# Patient Record
Sex: MALE | Race: WHITE | Employment: FULL TIME | URBAN - METROPOLITAN AREA
[De-identification: names, ages, dates, MRNs, and addresses within clinical notes are randomized per-mention and may not be internally consistent; named-entity substitution may affect disease eponyms.]

---

## 2019-05-21 ENCOUNTER — HOSPITAL ENCOUNTER (EMERGENCY)
Age: 29
Discharge: HOME | End: 2019-05-22
Attending: EMERGENCY MEDICINE

## 2019-05-21 ENCOUNTER — HOSPITAL ENCOUNTER (OUTPATIENT)
Age: 29
Discharge: HOME | End: 2019-05-21

## 2019-05-21 ENCOUNTER — APPOINTMENT (OUTPATIENT)
Dept: GENERAL RADIOLOGY | Age: 29
End: 2019-05-21

## 2019-05-21 VITALS
DIASTOLIC BLOOD PRESSURE: 60 MMHG | BODY MASS INDEX: 20.73 KG/M2 | TEMPERATURE: 98.2 F | OXYGEN SATURATION: 99 % | HEART RATE: 87 BPM | WEIGHT: 140 LBS | HEIGHT: 69 IN | RESPIRATION RATE: 18 BRPM | SYSTOLIC BLOOD PRESSURE: 119 MMHG

## 2019-05-21 DIAGNOSIS — S61.412A LACERATION OF LEFT HAND, FOREIGN BODY PRESENCE UNSPECIFIED, INITIAL ENCOUNTER: Primary | ICD-10-CM

## 2019-05-21 DIAGNOSIS — F10.920 ACUTE ALCOHOLIC INTOXICATION WITHOUT COMPLICATION (HCC): ICD-10-CM

## 2019-05-21 LAB
ANION GAP SERPL CALCULATED.3IONS-SCNC: 11 MMOL/L (ref 7–16)
BASOPHILS ABSOLUTE: 0.08 E9/L (ref 0–0.2)
BASOPHILS RELATIVE PERCENT: 0.8 % (ref 0–2)
BUN BLDV-MCNC: 9 MG/DL (ref 6–20)
CALCIUM SERPL-MCNC: 9.1 MG/DL (ref 8.6–10.2)
CHLORIDE BLD-SCNC: 109 MMOL/L (ref 98–107)
CO2: 23 MMOL/L (ref 22–29)
CREAT SERPL-MCNC: 0.8 MG/DL (ref 0.7–1.2)
EOSINOPHILS ABSOLUTE: 0.37 E9/L (ref 0.05–0.5)
EOSINOPHILS RELATIVE PERCENT: 3.5 % (ref 0–6)
GFR AFRICAN AMERICAN: >60
GFR NON-AFRICAN AMERICAN: >60 ML/MIN/1.73
GLUCOSE BLD-MCNC: 131 MG/DL (ref 74–99)
HCT VFR BLD CALC: 42.4 % (ref 37–54)
HEMOGLOBIN: 14 G/DL (ref 12.5–16.5)
IMMATURE GRANULOCYTES #: 0.04 E9/L
IMMATURE GRANULOCYTES %: 0.4 % (ref 0–5)
LYMPHOCYTES ABSOLUTE: 4.22 E9/L (ref 1.5–4)
LYMPHOCYTES RELATIVE PERCENT: 40.4 % (ref 20–42)
MAGNESIUM: 2.2 MG/DL (ref 1.6–2.6)
MCH RBC QN AUTO: 29.5 PG (ref 26–35)
MCHC RBC AUTO-ENTMCNC: 33 % (ref 32–34.5)
MCV RBC AUTO: 89.5 FL (ref 80–99.9)
MONOCYTES ABSOLUTE: 0.94 E9/L (ref 0.1–0.95)
MONOCYTES RELATIVE PERCENT: 9 % (ref 2–12)
NEUTROPHILS ABSOLUTE: 4.79 E9/L (ref 1.8–7.3)
NEUTROPHILS RELATIVE PERCENT: 45.9 % (ref 43–80)
PDW BLD-RTO: 13.6 FL (ref 11.5–15)
PLATELET # BLD: 253 E9/L (ref 130–450)
PMV BLD AUTO: 10.8 FL (ref 7–12)
POTASSIUM REFLEX MAGNESIUM: 3.4 MMOL/L (ref 3.5–5)
RBC # BLD: 4.74 E12/L (ref 3.8–5.8)
SODIUM BLD-SCNC: 143 MMOL/L (ref 132–146)
WBC # BLD: 10.4 E9/L (ref 4.5–11.5)

## 2019-05-21 PROCEDURE — 2580000003 HC RX 258: Performed by: EMERGENCY MEDICINE

## 2019-05-21 PROCEDURE — 85025 COMPLETE CBC W/AUTO DIFF WBC: CPT

## 2019-05-21 PROCEDURE — A0428 BLS: HCPCS

## 2019-05-21 PROCEDURE — 80048 BASIC METABOLIC PNL TOTAL CA: CPT

## 2019-05-21 PROCEDURE — 80307 DRUG TEST PRSMV CHEM ANLYZR: CPT

## 2019-05-21 PROCEDURE — G0480 DRUG TEST DEF 1-7 CLASSES: HCPCS

## 2019-05-21 PROCEDURE — 73120 X-RAY EXAM OF HAND: CPT

## 2019-05-21 PROCEDURE — 99284 EMERGENCY DEPT VISIT MOD MDM: CPT

## 2019-05-21 PROCEDURE — A0425 GROUND MILEAGE: HCPCS

## 2019-05-21 PROCEDURE — 83735 ASSAY OF MAGNESIUM: CPT

## 2019-05-21 RX ORDER — TRANEXAMIC ACID 100 MG/ML
INJECTION, SOLUTION INTRAVENOUS
Status: DISCONTINUED
Start: 2019-05-21 | End: 2019-05-22 | Stop reason: HOSPADM

## 2019-05-21 RX ORDER — LIDOCAINE HYDROCHLORIDE 10 MG/ML
INJECTION, SOLUTION INFILTRATION; PERINEURAL
Status: DISCONTINUED
Start: 2019-05-21 | End: 2019-05-22 | Stop reason: HOSPADM

## 2019-05-21 RX ORDER — ACETAMINOPHEN 650 MG
TABLET, EXTENDED RELEASE ORAL
Status: COMPLETED
Start: 2019-05-21 | End: 2019-05-21

## 2019-05-21 RX ORDER — SODIUM CHLORIDE, SODIUM LACTATE, POTASSIUM CHLORIDE, AND CALCIUM CHLORIDE .6; .31; .03; .02 G/100ML; G/100ML; G/100ML; G/100ML
1000 INJECTION, SOLUTION INTRAVENOUS ONCE
Status: COMPLETED | OUTPATIENT
Start: 2019-05-21 | End: 2019-05-22

## 2019-05-21 RX ORDER — ACETAMINOPHEN 650 MG
TABLET, EXTENDED RELEASE ORAL ONCE
Status: COMPLETED | OUTPATIENT
Start: 2019-05-21 | End: 2019-05-21

## 2019-05-21 RX ORDER — SODIUM CHLORIDE 0.9 % (FLUSH) 0.9 %
10 SYRINGE (ML) INJECTION PRN
Status: DISCONTINUED | OUTPATIENT
Start: 2019-05-21 | End: 2019-05-22 | Stop reason: HOSPADM

## 2019-05-21 RX ADMIN — SODIUM CHLORIDE, POTASSIUM CHLORIDE, SODIUM LACTATE AND CALCIUM CHLORIDE 1000 ML: 600; 310; 30; 20 INJECTION, SOLUTION INTRAVENOUS at 23:44

## 2019-05-21 RX ADMIN — Medication: at 23:43

## 2019-05-21 RX ADMIN — SODIUM CHLORIDE 1000 ML: 9 INJECTION, SOLUTION INTRAVENOUS at 23:47

## 2019-05-21 ASSESSMENT — ENCOUNTER SYMPTOMS
BACK PAIN: 0
EYE PAIN: 0
BLOOD IN STOOL: 0
ABDOMINAL PAIN: 0
COUGH: 0
DIARRHEA: 0
VOMITING: 0
NAUSEA: 0
CHEST TIGHTNESS: 0
SORE THROAT: 0
COLOR CHANGE: 0
RHINORRHEA: 0
SHORTNESS OF BREATH: 0

## 2019-05-22 ENCOUNTER — HOSPITAL ENCOUNTER (INPATIENT)
Age: 29
LOS: 3 days | Discharge: HOME | DRG: 885 | End: 2019-05-25
Attending: EMERGENCY MEDICINE | Admitting: PSYCHIATRY & NEUROLOGY

## 2019-05-22 DIAGNOSIS — F10.920 ACUTE ALCOHOLIC INTOXICATION WITHOUT COMPLICATION (HCC): ICD-10-CM

## 2019-05-22 DIAGNOSIS — F33.2 SEVERE EPISODE OF RECURRENT MAJOR DEPRESSIVE DISORDER, WITHOUT PSYCHOTIC FEATURES (HCC): ICD-10-CM

## 2019-05-22 DIAGNOSIS — S61.411A LACERATION OF RIGHT HAND WITHOUT FOREIGN BODY, INITIAL ENCOUNTER: Primary | ICD-10-CM

## 2019-05-22 PROBLEM — F32.A DEPRESSION, ACUTE: Status: ACTIVE | Noted: 2019-05-22

## 2019-05-22 LAB
ACETAMINOPHEN LEVEL: <5 MCG/ML (ref 10–30)
AMPHETAMINE SCREEN, URINE: NOT DETECTED
BARBITURATE SCREEN URINE: NOT DETECTED
BENZODIAZEPINE SCREEN, URINE: NOT DETECTED
CANNABINOID SCREEN URINE: POSITIVE
COCAINE METABOLITE SCREEN URINE: NOT DETECTED
ETHANOL: 216 MG/DL (ref 0–0.08)
ETHANOL: 279 MG/DL (ref 0–0.08)
ETHANOL: 86 MG/DL (ref 0–0.08)
METHADONE SCREEN, URINE: NOT DETECTED
OPIATE SCREEN URINE: NOT DETECTED
PHENCYCLIDINE SCREEN URINE: NOT DETECTED
PROPOXYPHENE SCREEN: NOT DETECTED
SALICYLATE, SERUM: <0.3 MG/DL (ref 0–30)
TRICYCLIC ANTIDEPRESSANTS SCREEN SERUM: NEGATIVE NG/ML

## 2019-05-22 PROCEDURE — 12002 RPR S/N/AX/GEN/TRNK2.6-7.5CM: CPT

## 2019-05-22 PROCEDURE — 6370000000 HC RX 637 (ALT 250 FOR IP): Performed by: NURSE PRACTITIONER

## 2019-05-22 PROCEDURE — 99284 EMERGENCY DEPT VISIT MOD MDM: CPT

## 2019-05-22 PROCEDURE — 6370000000 HC RX 637 (ALT 250 FOR IP): Performed by: EMERGENCY MEDICINE

## 2019-05-22 PROCEDURE — G0480 DRUG TEST DEF 1-7 CLASSES: HCPCS

## 2019-05-22 PROCEDURE — 1240000000 HC EMOTIONAL WELLNESS R&B

## 2019-05-22 PROCEDURE — 36415 COLL VENOUS BLD VENIPUNCTURE: CPT

## 2019-05-22 PROCEDURE — 80307 DRUG TEST PRSMV CHEM ANLYZR: CPT

## 2019-05-22 RX ORDER — LIDOCAINE HYDROCHLORIDE AND EPINEPHRINE 10; 10 MG/ML; UG/ML
20 INJECTION, SOLUTION INFILTRATION; PERINEURAL ONCE
Status: DISCONTINUED | OUTPATIENT
Start: 2019-05-22 | End: 2019-05-25 | Stop reason: HOSPADM

## 2019-05-22 RX ORDER — OLANZAPINE 10 MG/1
10 TABLET ORAL
Status: ACTIVE | OUTPATIENT
Start: 2019-05-22 | End: 2019-05-22

## 2019-05-22 RX ORDER — HALOPERIDOL 5 MG/ML
10 INJECTION INTRAMUSCULAR EVERY 6 HOURS PRN
Status: DISCONTINUED | OUTPATIENT
Start: 2019-05-22 | End: 2019-05-25 | Stop reason: HOSPADM

## 2019-05-22 RX ORDER — ACETAMINOPHEN 325 MG/1
650 TABLET ORAL EVERY 4 HOURS PRN
Status: DISCONTINUED | OUTPATIENT
Start: 2019-05-22 | End: 2019-05-25 | Stop reason: HOSPADM

## 2019-05-22 RX ORDER — HYDROXYZINE PAMOATE 25 MG/1
50 CAPSULE ORAL EVERY 6 HOURS PRN
Status: DISCONTINUED | OUTPATIENT
Start: 2019-05-22 | End: 2019-05-25 | Stop reason: HOSPADM

## 2019-05-22 RX ORDER — CEPHALEXIN 500 MG/1
500 CAPSULE ORAL ONCE
Status: DISCONTINUED | OUTPATIENT
Start: 2019-05-22 | End: 2019-05-22

## 2019-05-22 RX ORDER — MAGNESIUM HYDROXIDE/ALUMINUM HYDROXICE/SIMETHICONE 120; 1200; 1200 MG/30ML; MG/30ML; MG/30ML
30 SUSPENSION ORAL PRN
Status: DISCONTINUED | OUTPATIENT
Start: 2019-05-22 | End: 2019-05-25 | Stop reason: HOSPADM

## 2019-05-22 RX ORDER — ACETAMINOPHEN 325 MG/1
650 TABLET ORAL ONCE
Status: COMPLETED | OUTPATIENT
Start: 2019-05-22 | End: 2019-05-22

## 2019-05-22 RX ORDER — BENZTROPINE MESYLATE 1 MG/ML
2 INJECTION INTRAMUSCULAR; INTRAVENOUS 2 TIMES DAILY PRN
Status: DISCONTINUED | OUTPATIENT
Start: 2019-05-22 | End: 2019-05-25 | Stop reason: HOSPADM

## 2019-05-22 RX ORDER — TRAZODONE HYDROCHLORIDE 50 MG/1
50 TABLET ORAL NIGHTLY PRN
Status: DISCONTINUED | OUTPATIENT
Start: 2019-05-22 | End: 2019-05-25 | Stop reason: HOSPADM

## 2019-05-22 RX ADMIN — ACETAMINOPHEN 650 MG: 325 TABLET, FILM COATED ORAL at 16:51

## 2019-05-22 RX ADMIN — ACETAMINOPHEN 650 MG: 325 TABLET, FILM COATED ORAL at 12:28

## 2019-05-22 RX ADMIN — ACETAMINOPHEN 650 MG: 325 TABLET, FILM COATED ORAL at 21:49

## 2019-05-22 ASSESSMENT — PATIENT HEALTH QUESTIONNAIRE - PHQ9
SUM OF ALL RESPONSES TO PHQ QUESTIONS 1-9: 3
SUM OF ALL RESPONSES TO PHQ QUESTIONS 1-9: 8

## 2019-05-22 ASSESSMENT — LIFESTYLE VARIABLES: HISTORY_ALCOHOL_USE: NO

## 2019-05-22 ASSESSMENT — ENCOUNTER SYMPTOMS
BACK PAIN: 0
EYE REDNESS: 0
NAUSEA: 0
EYE PAIN: 0
DIARRHEA: 0
SHORTNESS OF BREATH: 0
SORE THROAT: 0
SINUS PRESSURE: 0
ABDOMINAL PAIN: 0
WHEEZING: 0
COUGH: 0
VOMITING: 0
EYE DISCHARGE: 0

## 2019-05-22 ASSESSMENT — SLEEP AND FATIGUE QUESTIONNAIRES
SLEEP PATTERN: DIFFICULTY FALLING ASLEEP
DO YOU USE A SLEEP AID: NO
RESTFUL SLEEP: YES
DIFFICULTY STAYING ASLEEP: NO
DIFFICULTY FALLING ASLEEP: YES
DIFFICULTY ARISING: NO
AVERAGE NUMBER OF SLEEP HOURS: 6
DO YOU HAVE DIFFICULTY SLEEPING: YES

## 2019-05-22 ASSESSMENT — PAIN SCALES - GENERAL
PAINLEVEL_OUTOF10: 3
PAINLEVEL_OUTOF10: 2
PAINLEVEL_OUTOF10: 5
PAINLEVEL_OUTOF10: 3
PAINLEVEL_OUTOF10: 3

## 2019-05-22 ASSESSMENT — PAIN DESCRIPTION - LOCATION: LOCATION: HAND

## 2019-05-22 ASSESSMENT — PAIN DESCRIPTION - ORIENTATION: ORIENTATION: LEFT

## 2019-05-22 NOTE — ED NOTES
Nurse to nurse report called to [address] RN pt assigned to room number [address],[facility], RN  05/22/19 2055

## 2019-05-22 NOTE — ED PROVIDER NOTES
43-year-old male transferred from [facility] for a left hand laceration sustained prior to arrival that facility. They're unable to control bleeding there. Several sutures were placed, hemostat was placed over an arterial bleed, and the wound was packed. Patient states he has some pain in the hand but does not have any numbness or tingling. He states he has been drinking since 4:30 this afternoon. The history is provided by the patient. Laceration   Location:  Hand  Hand laceration location:  L hand  Length:  6 cm  Depth: Through muscle  Quality: straight    Bleeding: arterial and controlled    Time since incident:  1 hour  Laceration mechanism:  Knife  Pain details:     Quality:  Aching    Severity:  Moderate  Foreign body present:  No foreign bodies  Relieved by:  Nothing  Worsened by:  Nothing  Ineffective treatments:  None tried  Tetanus status:  Up to date  Associated symptoms: no fever and no rash        Review of Systems   Constitutional: Negative for chills and fever. HENT: Negative for ear pain, sinus pressure and sore throat. Eyes: Negative for pain, discharge and redness. Respiratory: Negative for cough, shortness of breath and wheezing. Cardiovascular: Negative for chest pain. Gastrointestinal: Negative for abdominal pain, diarrhea, nausea and vomiting. Genitourinary: Negative for dysuria and frequency. Musculoskeletal: Negative for arthralgias and back pain. Skin: Negative for rash and wound. Neurological: Negative for weakness and headaches. Hematological: Negative for adenopathy. All other systems reviewed and are negative. Physical Exam   Constitutional: He is oriented to person, place, and time. He appears well-developed and well-nourished. HENT:   Head: Normocephalic and atraumatic. Eyes: Conjunctivae are normal.   Neck: Normal range of motion. Neck supple. Cardiovascular: Normal rate, regular rhythm and normal heart sounds.    No murmur heard. Pulmonary/Chest: Effort normal and breath sounds normal. No respiratory distress. He has no wheezes. He has no rales. Abdominal: Soft. Bowel sounds are normal. There is no tenderness. There is no rebound and no guarding. Musculoskeletal: He exhibits no edema, tenderness or deformity. 6 cm laceration to the left thenar eminence    Neurological: He is alert and oriented to person, place, and time. No cranial nerve deficit. Coordination normal.   Skin: Skin is warm and dry. Nursing note and vitals reviewed. Procedures    Laceration Repair Procedure Note    Indication: Laceration    Procedure: The patient was placed in the appropriate position and anesthesia around the laceration was obtained by infiltration using 1% Lidocaine with epinephrine. The area was then cleansed using betadine. The laceration was closed with 3-0 Prolene using interrupted sutures. There were no additional lacerations requiring repair. The wound area was then dressed with a bandage, gauze and vaseline soaked gauze. Minimal debridement was preformed, flaps were aligned. No foreign body was identified. Total repaired wound length: 6 cm. Other Items: Suture count: 10    The patient tolerated the procedure well. Complications: bleeding, improved after wound closed. MDM  Number of Diagnoses or Management Options  Acute alcoholic intoxication without complication [facility]):   Laceration of right hand without foreign body, initial encounter:   Diagnosis management comments: 77-year-old male presenting from [address] [place] [address] for left hand laceration with arterial bleeding. Patient has associated alcohol intoxication and there is concern for intentional injury per the family. Patient's laceration was closed as noted above with no further arterial bleeding noted.  Patient was medically cleared and social work evaluation was concerning for intentional injury and possible emotional trauma related to an anniversary to a friend's death. Patient will be held here for further monitoring and mental health evaluation. ED Course as of May 22 0338   Wed May 22, 2019   0229 Neurovascular function in the left hand is intact. No bleeding through the bandages. Patient is medically cleared at this time. [CS]      ED Course User Index  [CS] Nguyễn Ron, DO       ED Course as of May 22 0338   Wed May 22, 2019   0229 Neurovascular function in the left hand is intact. No bleeding through the bandages. Patient is medically cleared at this time. [CS]      ED Course User Index  [CS] Nguyễn Gamma, DO       --------------------------------------------- PAST HISTORY ---------------------------------------------  Past Medical History:  has no past medical history on file. Past Surgical History:  has no past surgical history on file. Social History:      Family History: family history is not on file. The patients home medications have been reviewed. Allergies: Patient has no known allergies.     -------------------------------------------------- RESULTS -------------------------------------------------    LABS:  Results for orders placed or performed during the hospital encounter of 05/22/19   Ethanol   Result Value Ref Range    Ethanol Lvl 216 mg/dL   Urine Drug Screen   Result Value Ref Range    Amphetamine Screen, Urine NOT DETECTED Negative <1000 ng/mL    Barbiturate Screen, Ur NOT DETECTED Negative < 200 ng/mL    Benzodiazepine Screen, Urine NOT DETECTED Negative < 200 ng/mL    Cannabinoid Scrn, Ur POSITIVE (A) Negative < 50ng/mL    Cocaine Metabolite Screen, Urine NOT DETECTED Negative < 300 ng/mL    Opiate Scrn, Ur NOT DETECTED Negative < 300ng/mL    PCP Screen, Urine NOT DETECTED Negative < 25 ng/mL    Methadone Screen, Urine NOT DETECTED Negative <300 ng/mL    Propoxyphene Scrn, Ur NOT DETECTED Negative <300 ng/mL       RADIOLOGY:  No orders to display     ------------------------- NURSING NOTES AND VITALS REVIEWED ---------------------------  Date / Time Roomed:  5/22/2019 12:21 AM  ED Bed Assignment:  05/05    The nursing notes within the ED encounter and vital signs as below have been reviewed. Patient Vitals for the past 24 hrs:   BP Temp Temp src Pulse Resp SpO2 Height Weight   05/22/19 0028 116/62 97.7 °F (36.5 °C) Temporal 80 18 96 % 5' 8\" (1.727 m) 140 lb (63.5 kg)       Oxygen Saturation Interpretation: Normal    ------------------------------------------ PROGRESS NOTES ------------------------------------------  Re-evaluation(s):  Time: 0126  Patients symptoms show no change  Repeat physical examination is not changed    Counseling:  I have spoken with the patient and discussed todays results, in addition to providing specific details for the plan of care and counseling regarding the diagnosis and prognosis. Their questions are answered at this time and they are agreeable with the plan of admission.    --------------------------------- ADDITIONAL PROVIDER NOTES ---------------------------------  Consultations:    Social work evaluated patient. He will be held for further evaluation. This patient's ED course included: a personal history and physicial examination, re-evaluation prior to disposition and multiple bedside re-evaluations    This patient has remained hemodynamically stable during their ED course. Diagnosis:  1. Laceration of right hand without foreign body, initial encounter    2. Acute alcoholic intoxication without complication ([facility] [address]        Disposition:  Patient's disposition:Medically cleared; pending social work evaluation. Patient's condition is stable.         NOTE: This report was transcribed using voice recognition software. Every effort was made to ensure accuracy; however, inadvertent computerized transcription errors may be present.           Abiola Ramirez DO  Resident  05/22/19 3814

## 2019-05-22 NOTE — ED NOTES
Pt pink slipped, medically cleared moved to [facility].      Mirella Griffin RN  05/22/19 1585

## 2019-05-22 NOTE — ED PROVIDER NOTES
Normal range of motion. He exhibits tenderness. He exhibits no edema. ROM intact of hand, sensation to all dermatomes, capillary refill and finger opposition intact. Lymphadenopathy:     He has no cervical adenopathy. Neurological: He is alert. Skin: Skin is warm and dry. No rash noted. He is not diaphoretic. No pallor. Significant laceration noted to left hand with continued bleeding. Approximately 5-6 cm laceration. Nursing note and vitals reviewed. Procedures    MDM    ED Course as of May 22 0122   Tue May 21, 2019   2341 Bleeding continues despite pressure, attending placed hemostat, two sutures placed for ligation without improvement in bleed. Placed surgicel and bulk dressing. [facility] en route for [facility] transfer. [RU]      ED Course User Index  [RU] Amanda Wayne DO       --------------------------------------------- PAST HISTORY ---------------------------------------------  Past Medical History:  has no past medical history on file. Past Surgical History:  has no past surgical history on file. Social History:      Family History: family history is not on file. The patients home medications have been reviewed. Allergies: Patient has no known allergies.     -------------------------------------------------- RESULTS -------------------------------------------------    Lab  Results for orders placed or performed during the hospital encounter of 05/21/19   CBC Auto Differential   Result Value Ref Range    WBC 10.4 4.5 - 11.5 E9/L    RBC 4.74 3.80 - 5.80 E12/L    Hemoglobin 14.0 12.5 - 16.5 g/dL    Hematocrit 42.4 37.0 - 54.0 %    MCV 89.5 80.0 - 99.9 fL    MCH 29.5 26.0 - 35.0 pg    MCHC 33.0 32.0 - 34.5 %    RDW 13.6 11.5 - 15.0 fL    Platelets 347 691 - 767 E9/L    MPV 10.8 7.0 - 12.0 fL    Neutrophils % 45.9 43.0 - 80.0 %    Immature Granulocytes % 0.4 0.0 - 5.0 %    Lymphocytes % 40.4 20.0 - 42.0 %    Monocytes % 9.0 2.0 - 12.0 %    Eosinophils % 3.5 0.0 - 6.0 %    Basophils % 0.8 0.0 - 2.0 %    Neutrophils # 4.79 1.80 - 7.30 E9/L    Immature Granulocytes # 0.04 E9/L    Lymphocytes # 4.22 (H) 1.50 - 4.00 E9/L    Monocytes # 0.94 0.10 - 0.95 E9/L    Eosinophils # 0.37 0.05 - 0.50 E9/L    Basophils # 0.08 0.00 - 0.20 L9/S   Basic Metabolic Panel w/ Reflex to MG   Result Value Ref Range    Sodium 143 132 - 146 mmol/L    Potassium reflex Magnesium 3.4 (L) 3.5 - 5.0 mmol/L    Chloride 109 (H) 98 - 107 mmol/L    CO2 23 22 - 29 mmol/L    Anion Gap 11 7 - 16 mmol/L    Glucose 131 (H) 74 - 99 mg/dL    BUN 9 6 - 20 mg/dL    CREATININE 0.8 0.7 - 1.2 mg/dL    GFR Non-African American >60 >=60 mL/min/1.73    GFR African American >60     Calcium 9.1 8.6 - 10.2 mg/dL   Serum Drug Screen   Result Value Ref Range    Ethanol Lvl 279 mg/dL    Acetaminophen Level <5.0 (L) 10.0 - 74.9 mcg/mL    Salicylate, Serum <8.1 0.0 - 30.0 mg/dL   Magnesium   Result Value Ref Range    Magnesium 2.2 1.6 - 2.6 mg/dL       Radiology  XR HAND LEFT (2 VIEWS)   Final Result      No fracture or dislocation.                                ------------------------- NURSING NOTES AND VITALS REVIEWED ---------------------------  Date / Time Roomed:  5/21/2019 10:43 PM  ED Bed Assignment:  LANE/LANE    The nursing notes within the ED encounter and vital signs as below have been reviewed.    Patient Vitals for the past 24 hrs:   BP Temp Pulse Resp SpO2 Height Weight   05/21/19 2358 119/60 -- 87 18 99 % -- --   05/21/19 2345 120/65 -- 89 -- -- -- --   05/21/19 2330 129/75 -- 78 -- -- -- --   05/21/19 2318 (!) 142/74 98.2 °F (36.8 °C) 87 16 98 % 5' 9\" (1.753 m) 140 lb (63.5 kg)   05/21/19 2315 117/72 -- 85 -- -- -- --   05/21/19 2300 121/74 -- -- -- -- -- --   05/21/19 2245 121/74 -- -- -- -- -- --       Oxygen Saturation Interpretation: Normal    ------------------------------------------ PROGRESS NOTES ------------------------------------------  Consultations:  Spoke with Dr. Bennie Romeo,  They will see the patient on arrival in the ED.    Attending spoke with Dr. Evans Barraza (Orthopedic surgery). --------------------------------------- ED Clinical Course/MDM --------------------------------------    Patient is a 68-year-old male presenting with acute intoxication, laceration to his left hand. Had significant blood loss, was actively bleeding on arrival. Attending attempted to control the bleeding with blood pressure cuff for tourniquet, exploration of the wound, had multiple small pumping arterial bleeding but no high volume or pressure bleeding identified. Multiple tries with hemostats, ligation were attempted to obtain hemostasis but unsuccessful. Subsequently packed the wound with Surgicel, bulk dressing over the hemostat. Attending spoke with orthopedic surgery who recommended transfer to  for surgical evaluation at this time. 1:24 AM  Attending has spoken with the patient and discussed todays results, in addition to providing specific details for the plan of care and counseling regarding the diagnosis and prognosis. Their questions are answered at this time and they are agreeable with the plan. I have discussed the risks and benefits of transfer and they wish to proceed with the transfer. --------------------------------- ADDITIONAL PROVIDER NOTES ---------------------------------  Reason for transfer: Hand laceration. This patient's ED course included: a personal history and physicial examination, re-evaluation prior to disposition and complex medical decision making and emergency management    This patient has remained hemodynamically stable during their ED course. Please note that the withdrawal or failure to initiate urgent interventions for this patient would likely result in a life threatening deterioration or permanent disability. Accordingly this patient received 30 minutes of critical care time, excluding separately billable procedures. Clinical Impression  1.  Laceration of left hand, foreign body presence unspecified, initial encounter    2. Acute alcoholic intoxication without complication ([facility] [address]          Disposition  Patient's disposition: Transfer to [facility]. Transferred by: Mobile. Patient's condition is stable.        Elle Sosa DO  Resident  05/22/19 4584

## 2019-05-22 NOTE — ED NOTES
Pt has been accepted to 7s by Dr. Phoebe Combs admitting and spoke to jaya to assign bed 7525    Faxed pink slip to [address], [facility] [place]  05/22/19 7075

## 2019-05-22 NOTE — ED NOTES
EMS here to transport pt to [address] [place] [address] ER. EMS not contracted to transport pt with tele. Physician okay with transporting pt without tele.      Alton Joyce RN  05/21/19 6807
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
## 2019-05-22 NOTE — ED NOTES
Per Dr Avery Person pt is medically cleared and can be evaluated by      Lindy Gowers, MELITON  05/22/19 0986

## 2019-05-22 NOTE — ED NOTES
PT WAS ASSESSED AND NEEDS ADMISSION - AWAITING FOR D/C'S ON 7S TO PROCEED WITH ADMISSION     LAWRENCE Hernandez  05/22/19 9543

## 2019-05-22 NOTE — PROGRESS NOTES
Pt admitted from [facility] for Depression. Pt denies suicidal ideations, homicidal ideations and hallucinations. Pt presents sad and anxious. Pleasant and cooperative. Pt reports poor sleep due to racing thoughts. \"It's just going over things that happen during the day. \" Pt denies this laceration was self inflicted. Pt stated \"I was drinking a lot that day. I just wanted a buzz. I was off and wanted to enjoy my day. \" Pt states he has past loss of a best friend over a year ago. Pt stated he has been clean from Heroin for 6 months. Pt has no past psych history. Pt is not taking any medication. Hx of long-term in [place] for past child support. Pt complains of pain 3/10 due to laceration. Tylenol PRN given. Pt satisfied. Pt oriented to room and unit. Will continue to monitor. `[facility]  Admission Note     Admission Type:   Admission Type: Involuntary    Reason for admission:  Reason for Admission: \"I was drinking and cutting chicken and the knife slipped\"    PATIENT STRENGTHS:  Strengths: Communication, No significant Physical Illness, Employment, Positive Support    Patient Strengths and Limitations:  Limitations: Demonstrates discomfort with /lack of social skills    Addictive Behavior:   Addictive Behavior  In the past 3 months, have you felt or has someone told you that you have a problem with:  : None  Do you have a history of Chemical Use?: No  Do you have a history of Alcohol Use?: No(usually just social - day off- wanted buzz)  Do you have a history of Street Drug Abuse?: No(clean 6 mo)  Histroy of Prescripton Drug Abuse?: No    Medical Problems:   History reviewed. No pertinent past medical history.     Status EXAM:  Status and Exam  Normal: Yes  Facial Expression: Sad  Affect: Appropriate  Level of Consciousness: Alert  Mood:Normal: No  Mood: Depressed, Sad  Motor Activity:Normal: Yes  Interview Behavior: Cooperative  Preception: Hank[place] to Person, [place] to Time, Hank[place] to Place, [place] to Situation  Attention:Normal: Yes  Thought Processes: Circumstantial  Thought Content:Normal: Yes  Hallucinations: None  Delusions: No  Memory:Normal: Yes  Insight and Judgment: No  Insight and Judgment: Poor Judgment  Present Suicidal Ideation: No  Present Homicidal Ideation: No    Tobacco Screening:  Practical Counseling, on admission, nadege X, if applicable and completed (first 3 are required if patient doesn't refuse): (x )  Recognizing danger situations (included triggers and roadblocks)                    (x )  Coping skills (new ways to manage stress, exercise, relaxation techniques, changing routine, distraction)                                                           ( x)  Basic information about quitting (benefits of quitting, techniques in how to quit, available resources  ( ) [facility]                                           ( ) Patient refused counseling  ( ) Patient has not smoked in the last 30 days    Metabolic Screening:    No results found for: LABA1C    No results found for: CHOL  No results found for: TRIG  No results found for: HDL  No components found for: LDLCAL  No results found for: LABVLDL      Body mass index is 21.29 kg/m². BP Readings from Last 2 Encounters:   05/22/19 (!) 149/87   05/21/19 119/60           Pt admitted with followings belongings:  Dentures: None  Vision - Corrective Lenses: None  Hearing Aid: None  Jewelry: Watch  Body Piercings Removed: N/A  Clothing: Shirt, Pants, Other (Comment)  Were All Patient Medications Collected?: Not Applicable  Other Valuables: Wallet     Valuables sent home with n/a. Valuables placed in safe in security envelope, number:  n/a. Patient's home medications were n/a. Patient oriented to surroundings and program expectations and copy of patient rights given. Received admission packet:  yes. Consents reviewed, signed yes. Refused n/a. Patient verbalize understanding:  yes.     Patient education on precautions: yes                   Delonte Gill RN
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
## 2019-05-23 PROBLEM — F33.2 SEVERE EPISODE OF RECURRENT MAJOR DEPRESSIVE DISORDER, WITHOUT PSYCHOTIC FEATURES (HCC): Status: ACTIVE | Noted: 2019-05-23

## 2019-05-23 LAB
ANION GAP SERPL CALCULATED.3IONS-SCNC: 13 MMOL/L (ref 7–16)
BUN BLDV-MCNC: 15 MG/DL (ref 6–20)
CALCIUM SERPL-MCNC: 8.8 MG/DL (ref 8.6–10.2)
CHLORIDE BLD-SCNC: 99 MMOL/L (ref 98–107)
CHOLESTEROL, TOTAL: 151 MG/DL (ref 0–199)
CO2: 24 MMOL/L (ref 22–29)
CREAT SERPL-MCNC: 0.8 MG/DL (ref 0.7–1.2)
GFR AFRICAN AMERICAN: >60
GFR NON-AFRICAN AMERICAN: >60 ML/MIN/1.73
GLUCOSE BLD-MCNC: 87 MG/DL (ref 74–99)
HBA1C MFR BLD: 4.9 % (ref 4–5.6)
HCT VFR BLD CALC: 36 % (ref 37–54)
HDLC SERPL-MCNC: 61 MG/DL
HEMOGLOBIN: 11.6 G/DL (ref 12.5–16.5)
LDL CHOLESTEROL CALCULATED: 60 MG/DL (ref 0–99)
MAGNESIUM: 2 MG/DL (ref 1.6–2.6)
MCH RBC QN AUTO: 28.4 PG (ref 26–35)
MCHC RBC AUTO-ENTMCNC: 32.2 % (ref 32–34.5)
MCV RBC AUTO: 88 FL (ref 80–99.9)
PDW BLD-RTO: 13.7 FL (ref 11.5–15)
PLATELET # BLD: 251 E9/L (ref 130–450)
PMV BLD AUTO: 11.4 FL (ref 7–12)
POTASSIUM REFLEX MAGNESIUM: 3.4 MMOL/L (ref 3.5–5)
RBC # BLD: 4.09 E12/L (ref 3.8–5.8)
SODIUM BLD-SCNC: 136 MMOL/L (ref 132–146)
TRIGL SERPL-MCNC: 152 MG/DL (ref 0–149)
VLDLC SERPL CALC-MCNC: 30 MG/DL
WBC # BLD: 14.7 E9/L (ref 4.5–11.5)

## 2019-05-23 PROCEDURE — 99221 1ST HOSP IP/OBS SF/LOW 40: CPT | Performed by: NURSE PRACTITIONER

## 2019-05-23 PROCEDURE — 83036 HEMOGLOBIN GLYCOSYLATED A1C: CPT

## 2019-05-23 PROCEDURE — 36415 COLL VENOUS BLD VENIPUNCTURE: CPT

## 2019-05-23 PROCEDURE — 80061 LIPID PANEL: CPT

## 2019-05-23 PROCEDURE — 90471 IMMUNIZATION ADMIN: CPT | Performed by: CLINICAL NURSE SPECIALIST

## 2019-05-23 PROCEDURE — 6360000002 HC RX W HCPCS: Performed by: CLINICAL NURSE SPECIALIST

## 2019-05-23 PROCEDURE — 90714 TD VACC NO PRESV 7 YRS+ IM: CPT | Performed by: CLINICAL NURSE SPECIALIST

## 2019-05-23 PROCEDURE — 6370000000 HC RX 637 (ALT 250 FOR IP): Performed by: NURSE PRACTITIONER

## 2019-05-23 PROCEDURE — 80048 BASIC METABOLIC PNL TOTAL CA: CPT

## 2019-05-23 PROCEDURE — 6370000000 HC RX 637 (ALT 250 FOR IP): Performed by: CLINICAL NURSE SPECIALIST

## 2019-05-23 PROCEDURE — 83735 ASSAY OF MAGNESIUM: CPT

## 2019-05-23 PROCEDURE — 1240000000 HC EMOTIONAL WELLNESS R&B

## 2019-05-23 PROCEDURE — 85027 COMPLETE CBC AUTOMATED: CPT

## 2019-05-23 RX ORDER — TETANUS AND DIPHTHERIA TOXOIDS ADSORBED 2; 2 [LF]/.5ML; [LF]/.5ML
0.5 INJECTION INTRAMUSCULAR ONCE
Status: COMPLETED | OUTPATIENT
Start: 2019-05-23 | End: 2019-05-23

## 2019-05-23 RX ORDER — POTASSIUM CHLORIDE 20 MEQ/1
40 TABLET, EXTENDED RELEASE ORAL ONCE
Status: COMPLETED | OUTPATIENT
Start: 2019-05-23 | End: 2019-05-23

## 2019-05-23 RX ORDER — IBUPROFEN 600 MG/1
600 TABLET ORAL EVERY 6 HOURS PRN
Status: DISCONTINUED | OUTPATIENT
Start: 2019-05-23 | End: 2019-05-25 | Stop reason: HOSPADM

## 2019-05-23 RX ORDER — BACITRACIN, NEOMYCIN, POLYMYXIN B 400; 3.5; 5 [USP'U]/G; MG/G; [USP'U]/G
OINTMENT TOPICAL 2 TIMES DAILY
Status: DISCONTINUED | OUTPATIENT
Start: 2019-05-23 | End: 2019-05-24

## 2019-05-23 RX ORDER — CEPHALEXIN 500 MG/1
500 CAPSULE ORAL EVERY 6 HOURS SCHEDULED
Status: DISCONTINUED | OUTPATIENT
Start: 2019-05-23 | End: 2019-05-25 | Stop reason: HOSPADM

## 2019-05-23 RX ORDER — VENLAFAXINE HYDROCHLORIDE 37.5 MG/1
37.5 CAPSULE, EXTENDED RELEASE ORAL
Status: DISCONTINUED | OUTPATIENT
Start: 2019-05-24 | End: 2019-05-25 | Stop reason: HOSPADM

## 2019-05-23 RX ADMIN — POTASSIUM CHLORIDE 40 MEQ: 20 TABLET, EXTENDED RELEASE ORAL at 13:43

## 2019-05-23 RX ADMIN — BACITRACIN ZINC, POLYMYXIN B SULFATE, NEOMYCIN SULFATE: 400; 5000; 3.5 OINTMENT TOPICAL at 20:55

## 2019-05-23 RX ADMIN — CEPHALEXIN 500 MG: 500 CAPSULE ORAL at 17:22

## 2019-05-23 RX ADMIN — CEPHALEXIN 500 MG: 500 CAPSULE ORAL at 13:43

## 2019-05-23 RX ADMIN — IBUPROFEN 600 MG: 600 TABLET ORAL at 13:06

## 2019-05-23 RX ADMIN — BACITRACIN ZINC, POLYMYXIN B SULFATE, NEOMYCIN SULFATE: 400; 5000; 3.5 OINTMENT TOPICAL at 13:43

## 2019-05-23 RX ADMIN — TETANUS AND DIPHTHERIA TOXOIDS ADSORBED 0.5 ML: 2; 2 INJECTION INTRAMUSCULAR at 13:43

## 2019-05-23 ASSESSMENT — PAIN SCALES - GENERAL
PAINLEVEL_OUTOF10: 0
PAINLEVEL_OUTOF10: 0
PAINLEVEL_OUTOF10: 3

## 2019-05-23 ASSESSMENT — SLEEP AND FATIGUE QUESTIONNAIRES
DIFFICULTY FALLING ASLEEP: YES
DIFFICULTY ARISING: NO
DO YOU USE A SLEEP AID: NO
RESTFUL SLEEP: NO
DIFFICULTY STAYING ASLEEP: NO
DO YOU HAVE DIFFICULTY SLEEPING: YES

## 2019-05-23 ASSESSMENT — LIFESTYLE VARIABLES: HISTORY_ALCOHOL_USE: NO

## 2019-05-23 NOTE — PROGRESS NOTES
[address] [place] [address]  Initial Interdisciplinary Treatment Plan NOTE    Review Date & Time: 5- 1020    Patient was in treatment team    Admission Type:   Admission Type: Involuntary    Reason for admission:  Reason for Admission: \"I was drinking and cutting chicken and the knife slipped\"      Estimated Length of Stay Update:  3-5 days  Estimated Discharge Date Update: 5-    PATIENT STRENGTHS:  Patient Strengths Strengths: Positive Support, Communication, Employment, Social Skills  Patient Strengths and Limitations:Limitations: General negative or hopeless attitude about future/recovery, Difficulty problem solving/relies on others to help solve problems  Addictive Behavior:Addictive Behavior  In the past 3 months, have you felt or has someone told you that you have a problem with:  : None  Do you have a history of Chemical Use?: Yes  Do you have a history of Alcohol Use?: No  Do you have a history of Street Drug Abuse?: Yes  Histroy of Prescripton Drug Abuse?: No  Medical Problems:History reviewed. No pertinent past medical history. EDUCATION:   Learner Progress Toward Treatment Goals: Reviewed group plan and strategies    Method: Small group    Outcome: Demonstrated Understanding    PATIENT GOALS: to go to group    PLAN/TREATMENT RECOMMENDATIONS UPDATE:medication compliance and group therapy attendance    GOALS UPDATE:   Time frame for Short-Term Goals: 3-5 days    Param Zhong  Initial Interdisciplinary Treatment Plan NOTE    Review Date & Time: 5- 1020    Patient was in treatment team    Admission Type:   Admission Type:  Involuntary    Reason for admission:  Reason for Admission: \"I was drinking and cutting chicken and the knife slipped\"      Estimated Length of Stay Update:  3-5 days  Estimated Discharge Date Update: 5-    PATIENT STRENGTHS:  Patient Strengths Strengths: Positive Support, Communication, Employment, Social Skills  Patient Strengths and Limitations:Limitations: General negative or hopeless attitude about future/recovery, Difficulty problem solving/relies on others to help solve problems  Addictive Behavior:Addictive Behavior  In the past 3 months, have you felt or has someone told you that you have a problem with:  : None  Do you have a history of Chemical Use?: Yes  Do you have a history of Alcohol Use?: No  Do you have a history of Street Drug Abuse?: Yes  Histroy of Prescripton Drug Abuse?: No  Medical Problems:History reviewed. No pertinent past medical history.     EDUCATION:   Learner Progress Toward Treatment Goals: Reviewed group plan and strategies    Method: Small group    Outcome: Demonstrated Understanding    PATIENT GOALS: to go to group    PLAN/TREATMENT RECOMMENDATIONS UPDATE:medication compliance, group therapy     GOALS UPDATE:   Time frame for Short-Term Goals: 3-5 days    Modesto Gonzalez RN

## 2019-05-23 NOTE — CONSULTS
Hospital Medicine  Consult History & Physical        Chief Complaint:    Medical management    Date of Service:   5/22/2019    History Of Present Illness:      25 y.o. male who we are asked to see/evaluate by Nabeel Domínguez, * for medical management. Admitted to [facility] for increased sadness and self inflicted laceration to left hand while intoxicated. Patient has a past history of SI, and states this was not a SA. Patient admits to being sad after his life long friend of 25 years passed away one year ago. Patient is wanting to have counseling and is undecided about medications at this time. He has a laceration to his left hand with sutures in place. Repeat labs reveal hypokalemia and leukocytosis. [facility] is consulted for medical management. Past Medical History:    History reviewed. No pertinent past medical history. Past Surgical History:    History reviewed. No pertinent surgical history. Medications Prior to Admission:      Prior to Admission medications    Not on File       Allergies:    Patient has no known allergies. Social History:      TOBACCO:   reports that he has been smoking cigarettes. He has never used smokeless tobacco.  ETOH:   has no alcohol history on file. Family History:    History reviewed. No pertinent family history. family history reviewed and found to be negative for contributing factors     REVIEW OF SYSTEMS:     Pertinent positives as noted in the HPI. All other systems reviewed and negative. PHYSICAL EXAM:  /72   Pulse 60   Temp 96.9 °F (36.1 °C) (Temporal)   Resp 17   Ht 5' 8\" (1.727 m)   Wt 140 lb (63.5 kg)   SpO2 96%   BMI 21.29 kg/m²   General appearance: No apparent distress, appears stated age and cooperative. HEENT: Normal cephalic, atraumatic without obvious deformity. Pupils equal, round, and reactive to light. Extra ocular muscles intact. Conjunctivae/corneas clear. Neck: Supple, with full range of motion.

## 2019-05-23 NOTE — PLAN OF CARE
Problem: Altered Mood, Depressive Behavior:  Goal: Able to verbalize acceptance of life and situations over which he or she has no control  Description  Able to verbalize acceptance of life and situations over which he or she has no control  Outcome: Ongoing  Goal: Able to verbalize and/or display a decrease in depressive symptoms  Description  Able to verbalize and/or display a decrease in depressive symptoms  5/23/2019 1439 by Layton Dobbs RN  Outcome: Ongoing  5/23/2019 0430 by Linda Alvarez RN  Outcome: Ongoing   Patient dressing changed as per order to left hand, sutures intact, dried blood cleansed with soap and water. Patient states he cut his hand accidentally when he was drinking and he did not intend to hurt himself. Patient denies thoughts to harm self or others. Behavior in control. Denies hallucinations. Patient states he is experiencing added stress due to family issues. Patient is social and is attending groups and is medication compliant.  Will continue to  monitor

## 2019-05-23 NOTE — GROUP NOTE
Group Therapy Note    Date: May 23    Group Start Time: 1025  Group End Time: 1140  Group Topic: Psychoeducation    [facility]  [address], [address]         Group Therapy Note    Attendees: 14                                                                   Module Name:  Id of stressors   Session Number:  11-2  Objective: to be able to id physical symptoms, and daily stressors currently experiencing. Participation Level: Active Listener and Interactive  Participation Quality: Appropriate and Attentive  Speech:  normal   Response to Learning: Able to verbalize/acknowledge new learning, Able to retain information and Progressing to goal  Endings: None Reported  Modes of Intervention: Education, Support, Socialization, Exploration and Problem-solving  Discipline Responsible: Psychoeducational Specialist  Signature:  Thelda Bamberger, CTRS      Notes: pleasant and sharing in group, able to participate appropriately.          Signature:  Lauren Cotton

## 2019-05-23 NOTE — GROUP NOTE
Group Therapy Note    Date: May 23    Group Start Time: 0130  Group End Time: 0200  Group Topic: Cognitive Skills    [facility]    Mae Adams, CTRS        Group Therapy Note    Number of participants: 7  Type of group: Cognitive Skills  Mode of intervention: Education, Support, Socialization, Exploration, Clarifying and Problem-solving  Topic: Coping Skills List   Objective: Patient will create their own personalized list of coping skills to utilize in recovery. Notes:  Patient was interactive during group filling out personal coping skills list to utilize in recovery. Patient gave support and feedback to others. Status After Intervention:  Improved    Participation Level:  Active Listener and Interactive    Participation Quality: Appropriate, Attentive, Sharing and Supportive      Speech:  normal      Thought Process/Content: Logical      Affective Functioning: Congruent      Mood: euthymic      Level of consciousness:  Alert, Oriented x4 and Attentive      Response to Learning: Able to verbalize current knowledge/experience, Able to verbalize/acknowledge new learning, Able to retain information, Capable of insight, Able to change behavior and Progressing to goal      Endings: None Reported    Modes of Intervention: Education, Support, Socialization, Exploration, Clarifying and Problem-solving

## 2019-05-24 PROCEDURE — 6370000000 HC RX 637 (ALT 250 FOR IP): Performed by: NURSE PRACTITIONER

## 2019-05-24 PROCEDURE — 6370000000 HC RX 637 (ALT 250 FOR IP): Performed by: CLINICAL NURSE SPECIALIST

## 2019-05-24 PROCEDURE — 99231 SBSQ HOSP IP/OBS SF/LOW 25: CPT | Performed by: NURSE PRACTITIONER

## 2019-05-24 PROCEDURE — 1240000000 HC EMOTIONAL WELLNESS R&B

## 2019-05-24 RX ORDER — GABAPENTIN 100 MG/1
100 CAPSULE ORAL 3 TIMES DAILY
Status: DISCONTINUED | OUTPATIENT
Start: 2019-05-24 | End: 2019-05-25 | Stop reason: HOSPADM

## 2019-05-24 RX ORDER — BACITRACIN, NEOMYCIN, POLYMYXIN B 400; 3.5; 5 [USP'U]/G; MG/G; [USP'U]/G
OINTMENT TOPICAL DAILY
Status: DISCONTINUED | OUTPATIENT
Start: 2019-05-25 | End: 2019-05-25 | Stop reason: HOSPADM

## 2019-05-24 RX ORDER — QUETIAPINE FUMARATE 25 MG/1
50 TABLET, FILM COATED ORAL NIGHTLY
Status: DISCONTINUED | OUTPATIENT
Start: 2019-05-24 | End: 2019-05-25 | Stop reason: HOSPADM

## 2019-05-24 RX ADMIN — IBUPROFEN 600 MG: 600 TABLET ORAL at 08:53

## 2019-05-24 RX ADMIN — CEPHALEXIN 500 MG: 500 CAPSULE ORAL at 00:07

## 2019-05-24 RX ADMIN — NICOTINE POLACRILEX 2 MG: 2 GUM, CHEWING BUCCAL at 10:18

## 2019-05-24 RX ADMIN — BACITRACIN ZINC, POLYMYXIN B SULFATE, NEOMYCIN SULFATE: 400; 5000; 3.5 OINTMENT TOPICAL at 10:18

## 2019-05-24 RX ADMIN — GABAPENTIN 100 MG: 100 CAPSULE ORAL at 10:24

## 2019-05-24 RX ADMIN — ACETAMINOPHEN 650 MG: 325 TABLET, FILM COATED ORAL at 08:53

## 2019-05-24 RX ADMIN — VENLAFAXINE HYDROCHLORIDE 37.5 MG: 37.5 CAPSULE, EXTENDED RELEASE ORAL at 10:17

## 2019-05-24 RX ADMIN — IBUPROFEN 600 MG: 600 TABLET ORAL at 20:55

## 2019-05-24 RX ADMIN — CEPHALEXIN 500 MG: 500 CAPSULE ORAL at 17:31

## 2019-05-24 RX ADMIN — CEPHALEXIN 500 MG: 500 CAPSULE ORAL at 06:02

## 2019-05-24 RX ADMIN — GABAPENTIN 100 MG: 100 CAPSULE ORAL at 20:55

## 2019-05-24 RX ADMIN — QUETIAPINE FUMARATE 50 MG: 25 TABLET ORAL at 20:54

## 2019-05-24 RX ADMIN — CEPHALEXIN 500 MG: 500 CAPSULE ORAL at 10:17

## 2019-05-24 RX ADMIN — GABAPENTIN 100 MG: 100 CAPSULE ORAL at 16:06

## 2019-05-24 RX ADMIN — TRAZODONE HYDROCHLORIDE 50 MG: 50 TABLET ORAL at 20:54

## 2019-05-24 ASSESSMENT — PAIN DESCRIPTION - ORIENTATION
ORIENTATION: LEFT
ORIENTATION: LEFT

## 2019-05-24 ASSESSMENT — PAIN DESCRIPTION - LOCATION
LOCATION: HAND
LOCATION: HAND;WRIST

## 2019-05-24 ASSESSMENT — PAIN SCALES - GENERAL
PAINLEVEL_OUTOF10: 7
PAINLEVEL_OUTOF10: 5
PAINLEVEL_OUTOF10: 4
PAINLEVEL_OUTOF10: 3
PAINLEVEL_OUTOF10: 6

## 2019-05-24 ASSESSMENT — PAIN DESCRIPTION - PAIN TYPE: TYPE: ACUTE PAIN

## 2019-05-24 ASSESSMENT — PAIN DESCRIPTION - DESCRIPTORS: DESCRIPTORS: ACHING;CONSTANT;DISCOMFORT

## 2019-05-24 ASSESSMENT — PAIN DESCRIPTION - FREQUENCY: FREQUENCY: CONTINUOUS

## 2019-05-24 ASSESSMENT — PAIN DESCRIPTION - ONSET: ONSET: GRADUAL

## 2019-05-24 NOTE — GROUP NOTE
Group Therapy Note    Date: May 24    Group Start Time: 0130  Group End Time: 0230  Group Topic: Cognitive Skills    [facility]    Mae Adams, CTRS        Group Therapy Note  Number of participants: 11  Type of group: Cognitive Skills  Mode of intervention: Education, Support, Socialization, Exploration, Clarifying and Problem-solving  Topic: Mindfulness SNACK: Stop, Notice, Accept, Curious, and Kindness  Objective: Patient will identify ways to be mindful in recovery utilizing the [facility] acronym. Notes:  Patient was interactive during group sharing ways to be mindful in recovery and how to utilize the [facility] acronym in recovery. Patient gave support and feedback to others. Status After Intervention:  Improved    Participation Level:  Active Listener and Interactive    Participation Quality: Appropriate, Attentive, Sharing and Supportive      Speech:  normal      Thought Process/Content: Logical      Affective Functioning: Congruent      Mood: euthymic      Level of consciousness:  Alert, Oriented x4 and Attentive      Response to Learning: Able to verbalize current knowledge/experience, Able to verbalize/acknowledge new learning, Able to retain information, Capable of insight, Able to change behavior and Progressing to goal      Endings: None Reported    Modes of Intervention: Education, Support, Socialization, Exploration, Clarifying and Problem-solving

## 2019-05-24 NOTE — PLAN OF CARE
PT. DENIES SUICIDAL IDEATION. UP AND SOCIAL AND IN GOOD CONTROL. ATTENDS GROUPS AND TAKES MEDICATONS. [address] [facility]  Day 3 Interdisciplinary Treatment Plan NOTE    Review Date & Time:  5/24/19 0900     Patient was in treatment team    Admission Type:   Admission Type: Involuntary    Reason for admission:  Reason for Admission: \"I was drinking and cutting chicken and the knife slipped\"  Estimated Length of Stay Update:  5 DAYS  Estimated Discharge Date Update: MONDAY    PATIENT STRENGTHS:  Patient Strengths Strengths: Positive Support, Communication, Employment, Social Skills  Patient Strengths and Limitations:Limitations: General negative or hopeless attitude about future/recovery, Difficulty problem solving/relies on others to help solve problems  Addictive Behavior:Addictive Behavior  In the past 3 months, have you felt or has someone told you that you have a problem with:  : None  Do you have a history of Chemical Use?: Yes  Do you have a history of Alcohol Use?: No  Do you have a history of Street Drug Abuse?: Yes  Histroy of Prescripton Drug Abuse?: No  Medical Problems:History reviewed. No pertinent past medical history.     Risk:  Fall RiskTotal: 73  Conrad Scale Conrad Scale Score: 22  BVC Total: 0  Change in scores: NO RISK IDENTIFIED    Changes to plan of Care : ASSESS FOR ANY INCREASE IN RISK    Status EXAM:   Status and Exam  Normal: No  Facial Expression: Worried  Affect: Congruent  Level of Consciousness: Alert  Mood:Normal: No  Mood: Depressed, Anxious  Motor Activity:Normal: Yes  Interview Behavior: Cooperative  Preception: [place] to Person, [place] to Time, [place] to Situation, [place] to Place  Attention:Normal: Yes  Attention: (INTACT)  Thought Processes: (ORGANIZED)  Thought Content:Normal: Yes  Thought Content: Other(See Comment)  Hallucinations: None  Delusions: No  Memory:Normal: Yes  Memory: Poor Recent  Insight and Judgment: No  Insight and Judgment: (IMPAIRED)  Present Suicidal Ideation: No  Present Homicidal Ideation: No    Daily Assessment Last Entry:   Daily Sleep (WDL): Within Defined Limits         Patient Currently in Pain: Yes  Daily Nutrition (WDL): Within Defined Limits    Patient Monitoring:  Frequency of Checks: 4 times per hour, close    Psychiatric Symptoms:   Depression Symptoms  Depression Symptoms: No problems reported or observed. Anxiety Symptoms  Anxiety Symptoms: No problems reported or observed. Kellen Symptoms  Kellen Symptoms: No problems reported or observed. Psychosis Symptoms  Hallucination Type: No problems reported or observed. Delusion Type: No problems reported or observed. Suicide Risk CSSR-S:  1) Within the past month, have you wished you were dead or wished you could go to sleep and not wake up? : Yes  2) Have you actually had any thoughts of killing yourself? : No  6) Have you ever done anything, started to do anything, or prepared to do anything to end your life?: No  Change in Result:  PT. DENIES SUICIDAL IDEATION   Change in Plan of care: [address].       EDUCATION:   Learner Progress Toward Treatment Goals: Reviewed results and recommendations of this team    Method: Small group    Outcome: Verbalized understanding    PATIENT GOALS: \"TURN NEGATIVES INTO POSITIVES\"    PLAN/TREATMENT RECOMMENDATIONS UPDATE: SUICIDE PRECAUTIONS, WOUND CARE, SUPPORTIVE CARE, ENCOURAGE GROUPS, DISCHARGE PLANNING AND FOLLOW UP    GOALS UPDATE:   Time frame for Short-Term Goals:  5 DAYS      Yaniv Landry RN

## 2019-05-24 NOTE — GROUP NOTE
Group Therapy Note    Date: May 24    Group Start Time: 1030  Group End Time: 9844  Group Topic: Psychoeducation    [facility]  [address], [place]        Group Therapy Note    Attendees: 12       Notes: Active and engaged during discussion on communication. Able to share and support peers. Status After Intervention:  Improved    Participation Level:  Active Listener and Interactive    Participation Quality: Appropriate, Attentive and Sharing      Speech:  normal      Thought Process/Content: Logical      Affective Functioning: Congruent      Mood: euthymic      Level of consciousness:  Alert and Attentive      Response to Learning: Capable of insight, Able to change behavior and Progressing to goal      Endings: None Reported    Modes of Intervention: Education, Support, Socialization and Exploration      Discipline Responsible: Psychoeducational Specialist      Signature:  Duarte Boyce

## 2019-05-24 NOTE — PROGRESS NOTES
Hospitalist Progress Note      PCP: No primary care provider on file. Date of Admission: 5/22/2019    Chief Complaint: medical management, hand laceration    Hospital Course:   25 y.o. male who we are asked to see/evaluate by Jonas Wei, Natalia for medical management. Admitted to [facility] for increased sadness and self inflicted laceration to left hand while intoxicated. Feliciano Swann has a past history of SI, and states this was not a SA.  Patient admits to being sad after his life long friend of 25 years passed away one year ago. Feliciano Swann is wanting to have counseling and is undecided about medications at this time. He has a laceration to his left hand with sutures in place. Repeat labs reveal hypokalemia and leukocytosis. [facility] is consulted for medical management. 5/24/19: no acute event overnight. Started on keflex for possible infection. Medications:  Reviewed    Infusion Medications   Scheduled Medications    venlafaxine  37.5 mg Oral Daily with breakfast    cephALEXin  500 mg Oral 4 times per day    neomycin-bacitracin-polymyxin   Topical BID    lidocaine-EPINEPHrine  20 mL Intradermal Once     PRN Meds: ibuprofen, acetaminophen, hydrOXYzine, haloperidol lactate, traZODone, benztropine mesylate, magnesium hydroxide, aluminum & magnesium hydroxide-simethicone, nicotine polacrilex    No intake or output data in the 24 hours ending 05/24/19 0917    Exam:    /77   Pulse 74   Temp 98.7 °F (37.1 °C) (Oral)   Resp 17   Ht 5' 8\" (1.727 m)   Wt 140 lb (63.5 kg)   SpO2 96%   BMI 21.29 kg/m²     General appearance: No apparent distress, appears stated age and cooperative. HEENT: Pupils equal, round, and reactive to light. Conjunctivae/corneas clear. Neck: Supple, with full range of motion. No jugular venous distention. Trachea midline. Respiratory:  Normal respiratory effort.    Cardiovascular: Regular rate and rhythm   Abdomen: Soft, non-tender,

## 2019-05-24 NOTE — PROGRESS NOTES
Patient declined to attend community meeting.  Patient identified goal for the day as: \"Turn negative into positive\"

## 2019-05-24 NOTE — PROGRESS NOTES
DATE OF SERVICE:     5/24/2019    Balaji Conley seen today for the purpose of continuation of care. Nursing, social work reports, laboratory studies and vital signs are reviewed. Patient chief complaint today is:             [x] Depression      [x] Anxiety        [] Psychosis         [] Suicidal/Homicidal                         [] Delusions           [] Aggression          Subjective: Today patient states that he is doing well, states he is anxious still. Patient states that he is doing well, denies SI, HI, or AVH. Sleep:  [x] Good [] Fair  [] Poor  Appetite:  [x] Good [] Fair  [] Poor    Depression:  [] Mild [x] Moderate [] Severe                [x] Constant [] Sporadic     Anxiety: [] Mild [] Moderate [x] Severe    [x] Constant [] Sporadic     Delusions: [] Mild [] Moderate [] Severe     [] Constant [] Sporadic     [] Paranoid [] Somatic [] Grandiose     Hallucinations: [] Mild [] Moderate [] Severe     [] Constant [] Sporadic    [] Auditory  [] Visual [] Tactile       Suicidal: [] Constant [] Sporadic  Homicidal: [] Constant [] Sporadic    Unscheduled Medications     [] Patient Receiving Emergency Medications \" Chemical Restraint\"   [] Requesting PRN medications for anxiety    Medical Review of Systems:     All other than marked systmes have been reviewed and are all negative.     Constitutional Symptoms: []  fever []  Chills  Skin Symptoms: [] rash []  Pruritus   Eye Symptoms: [] Vision unchanged []  recent vision problems[] blurred vision   Respiratory Symptoms:[] shortness of breath [] cough  Cardiovascular Symptoms:  [] chest pain   [] palpitations   Gastrointestinal Symptoms: []  abdominal pain []  nausea []  vomiting []  diarrhea  Genitourinary Symptoms: []  dysuria  []  hematuria   Musculoskeletal Symptoms: []  back pain []  muscle pain []  joint pain  Neurologic Symptoms: []  headache []  dizziness  Hematolymphoid Symptoms: [] Adenopathy [] Bruises   [] Schimosis       Psychiatric Review

## 2019-05-24 NOTE — CARE COORDINATION
Attempted to reach pts gf via phone, no answer unable to leave vm.    pts appts scheduled. He plans to return home upon d/c.

## 2019-05-25 VITALS
TEMPERATURE: 98.1 F | BODY MASS INDEX: 21.22 KG/M2 | HEART RATE: 64 BPM | WEIGHT: 140 LBS | OXYGEN SATURATION: 96 % | DIASTOLIC BLOOD PRESSURE: 66 MMHG | HEIGHT: 68 IN | SYSTOLIC BLOOD PRESSURE: 108 MMHG | RESPIRATION RATE: 16 BRPM

## 2019-05-25 PROCEDURE — 6370000000 HC RX 637 (ALT 250 FOR IP): Performed by: NURSE PRACTITIONER

## 2019-05-25 PROCEDURE — 99238 HOSP IP/OBS DSCHRG MGMT 30/<: CPT | Performed by: NURSE PRACTITIONER

## 2019-05-25 PROCEDURE — 6370000000 HC RX 637 (ALT 250 FOR IP): Performed by: CLINICAL NURSE SPECIALIST

## 2019-05-25 PROCEDURE — 6370000000 HC RX 637 (ALT 250 FOR IP): Performed by: PSYCHIATRY & NEUROLOGY

## 2019-05-25 RX ORDER — GABAPENTIN 100 MG/1
100 CAPSULE ORAL 3 TIMES DAILY
Qty: 42 CAPSULE | Refills: 0 | Status: SHIPPED | OUTPATIENT
Start: 2019-05-25 | End: 2019-06-05

## 2019-05-25 RX ORDER — QUETIAPINE FUMARATE 50 MG/1
50 TABLET, FILM COATED ORAL NIGHTLY
Qty: 30 TABLET | Refills: 0 | Status: ON HOLD | OUTPATIENT
Start: 2019-05-25 | End: 2019-07-29 | Stop reason: HOSPADM

## 2019-05-25 RX ADMIN — CEPHALEXIN 500 MG: 500 CAPSULE ORAL at 10:03

## 2019-05-25 RX ADMIN — GABAPENTIN 100 MG: 100 CAPSULE ORAL at 10:03

## 2019-05-25 RX ADMIN — IBUPROFEN 600 MG: 600 TABLET ORAL at 06:50

## 2019-05-25 RX ADMIN — CEPHALEXIN 500 MG: 500 CAPSULE ORAL at 00:17

## 2019-05-25 RX ADMIN — VENLAFAXINE HYDROCHLORIDE 37.5 MG: 37.5 CAPSULE, EXTENDED RELEASE ORAL at 10:03

## 2019-05-25 RX ADMIN — CEPHALEXIN 500 MG: 500 CAPSULE ORAL at 06:46

## 2019-05-25 RX ADMIN — BACITRACIN ZINC, POLYMYXIN B SULFATE, NEOMYCIN SULFATE: 400; 5000; 3.5 OINTMENT TOPICAL at 10:04

## 2019-05-25 ASSESSMENT — PAIN DESCRIPTION - DESCRIPTORS: DESCRIPTORS: ACHING

## 2019-05-25 ASSESSMENT — PAIN SCALES - GENERAL
PAINLEVEL_OUTOF10: 2
PAINLEVEL_OUTOF10: 2

## 2019-05-25 ASSESSMENT — PAIN DESCRIPTION - LOCATION: LOCATION: HAND

## 2019-05-25 NOTE — PROGRESS NOTES
Patient up and active on unit, appropriately groomed and attired. Patient's gait is steady and patient is independent with ADLs. Patient is bright, friendly and sociable. Positive and appropriate interactions with staff and peers. Calm and cooperative with staff and care. Denies thoughts or intent to harm self or others at this time. Denies audio or visual hallucinations at this time. Reports no concerns or complaints, no signs or symptoms of distress or discomfort. Able to maintain control of behaviors and emotions. Patient is encouraged to continue to work towards discharge goal by complying with medications, attending groups and to seek staff if feelings are overwhelming. Environmental rounds completed per unit policy to maintain safety of everyone on the unit. Staff will offer support and interventions as requested or required.

## 2019-05-25 NOTE — DISCHARGE SUMMARY
Physician Discharge Summary      Physical Examination   VS/Measurements:     /66   Pulse 64   Temp 98.1 °F (36.7 °C) (Oral)   Resp 16   Ht 5' 8\" (1.727 m)   Wt 140 lb (63.5 kg)   SpO2 96%   BMI 21.29 kg/m²         Discharge Medications:              Reagan Hand   Home Medication Instructions [facility]:[id number]    Printed on:05/25/19 1024   Medication Information                      gabapentin (NEURONTIN) 100 MG capsule  Take 1 capsule by mouth 3 times daily for 14 days.              nicotine polacrilex (NICORETTE) 2 MG gum  Take 1 each by mouth every 2 hours as needed for Smoking cessation             QUEtiapine (SEROQUEL) 50 MG tablet  Take 1 tablet by mouth nightly                     Psychiatric: Mental Status Examination:    Cognition:      [x] Alert  [x] Awake  [x] Oriented  [x] Person  [x] Place [x] Time    [] drowsy  [] tired  [] lethargic  [] distractable       Attention/Concentration:   [x] Attentive  [] Distracted        Memory Recent and Remote: [x] Intact   [] Impaired [] Partially Impaired     Language: [] Able to recognize and name objects          [] Unable to recognize and name Objects    Fund of Knowledge:  [] Poor []  Fair  [] Good    Speech: [x] Normal  [] Soft  [] Slow  [] Fast [] Pressured            [] Loud [] Dysarthria  [] Incoherent       Appearance: [] Well Groomed  [x] Casual Dressed  [] Unkept  [] Disheveled          [] Normal weight[] Thin  [] Overweight  [] Obese           Attitude: [] Positive  [] Hostile  [] Demanding  [] Guarded  [] Defensive         [x] Cooperative  []  Uncooperative      Behavior:  [x] Normal Gait  [] Walks with Assistance  [] Wanna Barnum    [] Walks with [place]  [] In Hospital Bed  [] Sitting in Chair    Muscle-Skeletal:  [x] Normal Muscle Tone [] Muscle Atrophy       [] Abnormal Muscle Movement     Eye Contact:  [x] Good eye contact  [] Intermittent Eye Contact  [] Poor Eye Contact     Mood: [] Depressed  [] Anxious  [] Irritated  [x] Euthymic   [] Angry [] Restless    Affect:  [x] Congruent  [] Incongruent  [] Labile  [] Constricted  [] Flat  [] Bizarre     Thought Process and Association:  [] Logical [] Illogical       [x] Linear and Goal Directed  [] Tangential  [] Circumstantial     Thought Content:  [x] Denies [] Endorses [] Suicidal [] Homicidal  [] Delusional      [] Paranoid  [] Somatic  [] Grandiose    Perception: [x]  None  [] Auditory   [] Visual  [] tactile   [] olfactory  [] Illusions         Insight: [] Intact  [x] Fair  [] Limited    Judgement:  [] Intact  [x] Fair  [] Limited    Hospital Course:   Admit Date: 5/22/2019     Discharge Date: 5/25/2019  Admitted from:  [x]  Emergency Room  []  Home  []  Another facility   []  [facility]     Admitting diagnosis:   Patient Active Problem List   Diagnosis    Depression, acute    Severe episode of recurrent major depressive disorder, without psychotic features ([address]      Length of stay:  3 days              Anuj Blake was admitted in Psychiatric unit  from ER with depression and SI. Patient was treated            With the above . Patient responded well to the treatment. Spoke with patient's girlfriend, Anita Krause, and she has no concerns for discharge of patient to home. Discharge Summary Plan:     Discharge Status:    [x] Improved [] Unchanged    [] Worse       Discharge instructions given:  [x] Patient    [] Family [] Other         Discharge disposition:  [x] Home [] Step Down unit  [] Group Home []  [facility]                                                    [] [facility]    [] AMA  [] Other           Prescriptions: Continue same medications, review with patient.        Reason for more than one antipsychotic:  [x] N/A  [] 3 failed monotherapy(drugs tried):  [] Cross over to a new antipsychotic  [] Taper to monotherapy from polypharmacy  [] Augmentation of Clozapine therapy due to treatment resistance to single therapy      Diagnosis:        Patient Active Problem List   Diagnosis Code    Depression, acute F32.9    Severe episode of recurrent major depressive disorder, without psychotic features ([address] F33.2       Education and Follow-up:  Counseled:  [x] Patient     [] Family    [] Guardian      Signed:   Michael Tadeo   5/25/2019   10:24 AM

## 2019-05-25 NOTE — GROUP NOTE
Group Therapy Note    Date: May 24    Group Start Time: 2030  Group End Time: 2100  Group Topic: Relaxation    [facility]  Johnny Mackenzie, RN        Group Therapy Note    Attendees: 12/24

## 2019-05-25 NOTE — PLAN OF CARE
Problem: Pain:  Goal: Pain level will decrease  Description  Pain level will decrease  Outcome: Completed  Goal: Control of acute pain  Description  Control of acute pain  Outcome: Completed  Goal: Control of chronic pain  Description  Control of chronic pain  Outcome: Completed     Problem: Altered Mood, Depressive Behavior:  Goal: Able to verbalize acceptance of life and situations over which he or she has no control  Description  Able to verbalize acceptance of life and situations over which he or she has no control  Outcome: Completed  Goal: Able to verbalize and/or display a decrease in depressive symptoms  Description  Able to verbalize and/or display a decrease in depressive symptoms  5/25/2019 1231 by Marnie Smith RN  Outcome: Completed  Dressing changed to left hand. Denies thoughts to harm self or others. Denies hallucinations. Patient social with peers. Depressed and anxious at times. Attending groups and medication compliant . Emotional support given.  Will continue to monitor

## 2019-05-25 NOTE — PROGRESS NOTES
CLINICAL PHARMACY NOTE: MEDS TO [address] Select Patient?: No  Total # of Prescriptions Filled: 3   The following medications were delivered to the patient:  · NEURONTIN 100 MG   · SEROQUEL 50 MG   · NICOTINE 2 MG GUM   Total # of Interventions Completed: 3  Time Spent (min): 30    Additional Documentation:

## 2019-05-25 NOTE — PROGRESS NOTES
[facility]  Discharge Note    Pt discharged with followings belongings:   Dentures: None  Vision - Corrective Lenses: None  Hearing Aid: None  Jewelry: Watch, None  Body Piercings Removed: N/A  Clothing: Shirt, Undergarments (Comment)(2 shirts)  Were All Patient Medications Collected?: Not Applicable  Other Valuables: None     Patient left department with Departure Mode: Family member via Mobility at Departure: Ambulatory, discharged to Discharged to: Private Residence.  Patient education on aftercare instructions: yes  Patient verbalize understanding of AVS:  yes    Status EXAM upon discharge:  Status and Exam  Normal: No  Facial Expression: Worried  Affect: Congruent  Level of Consciousness: Alert  Mood:Normal: No  Mood: Depressed, Anxious  Motor Activity:Normal: Yes  Interview Behavior: Cooperative  Preception: [place] to Person, [place]ola [place] to Time, [place] to Place, [place] to Situation  Attention:Normal: Yes  Attention: (INTACT)  Thought Processes: (ORGANIZED)  Thought Content:Normal: Yes  Thought Content: Other(See Comment)  Hallucinations: None  Delusions: No  Memory:Normal: Yes  Memory: Poor Recent  Insight and Judgment: No  Insight and Judgment: Poor Judgment, Poor Insight  Present Suicidal Ideation: No  Present Homicidal Ideation: No      Metabolic Screening:    Lab Results   Component Value Date    LABA1C 4.9 05/23/2019       Lab Results   Component Value Date    CHOL 151 05/23/2019     Lab Results   Component Value Date    TRIG 152 (H) 05/23/2019     Lab Results   Component Value Date    HDL 61 05/23/2019     No components found for: [facility]  Lab Results   Component Value Date    LABVLDL 30 05/23/2019       Lili Smith RN

## 2019-05-26 LAB — CANNABINOIDS CONF, URINE: 374 NG/ML

## 2019-05-30 ENCOUNTER — HOSPITAL ENCOUNTER (EMERGENCY)
Age: 29
Discharge: HOME | End: 2019-05-30

## 2019-05-30 VITALS
WEIGHT: 145 LBS | BODY MASS INDEX: 21.48 KG/M2 | RESPIRATION RATE: 14 BRPM | SYSTOLIC BLOOD PRESSURE: 128 MMHG | TEMPERATURE: 97.7 F | HEART RATE: 73 BPM | DIASTOLIC BLOOD PRESSURE: 83 MMHG | OXYGEN SATURATION: 97 % | HEIGHT: 69 IN

## 2019-05-30 DIAGNOSIS — Z51.89 VISIT FOR WOUND CHECK: Primary | ICD-10-CM

## 2019-05-30 PROCEDURE — 6370000000 HC RX 637 (ALT 250 FOR IP): Performed by: NURSE PRACTITIONER

## 2019-05-30 PROCEDURE — 99281 EMR DPT VST MAYX REQ PHY/QHP: CPT

## 2019-05-30 RX ORDER — NAPROXEN SODIUM 220 MG
220 TABLET ORAL 2 TIMES DAILY WITH MEALS
COMMUNITY
End: 2019-06-04

## 2019-05-30 RX ORDER — GINSENG 100 MG
CAPSULE ORAL ONCE
Status: COMPLETED | OUTPATIENT
Start: 2019-05-30 | End: 2019-05-30

## 2019-05-30 RX ADMIN — BACITRACIN: 500 OINTMENT TOPICAL at 18:29

## 2019-05-30 ASSESSMENT — PAIN DESCRIPTION - DESCRIPTORS: DESCRIPTORS: TINGLING

## 2019-05-30 ASSESSMENT — PAIN DESCRIPTION - PROGRESSION: CLINICAL_PROGRESSION: NOT CHANGED

## 2019-05-30 ASSESSMENT — PAIN SCALES - GENERAL: PAINLEVEL_OUTOF10: 3

## 2019-05-30 ASSESSMENT — PAIN DESCRIPTION - ONSET: ONSET: GRADUAL

## 2019-05-30 ASSESSMENT — PAIN - FUNCTIONAL ASSESSMENT: PAIN_FUNCTIONAL_ASSESSMENT: ACTIVITIES ARE NOT PREVENTED

## 2019-05-30 ASSESSMENT — PAIN DESCRIPTION - LOCATION: LOCATION: HAND

## 2019-05-30 ASSESSMENT — PAIN DESCRIPTION - PAIN TYPE: TYPE: ACUTE PAIN

## 2019-05-30 ASSESSMENT — PAIN DESCRIPTION - ORIENTATION: ORIENTATION: LEFT

## 2019-05-30 ASSESSMENT — PAIN DESCRIPTION - FREQUENCY: FREQUENCY: INTERMITTENT

## 2019-05-30 NOTE — ED PROVIDER NOTES
[facility]     HPI:  5/30/19,   Time: 6:18 PM         Siddhartha Lundberg is a 25 y.o. male presenting to the ED for wound check, beginning 8 days ago. The complaint has been constant, mild in severity, and worsened by nothing. Sutures placed to the left hand after he sustained a  Laceration to the left hand on 5-22, concerned for infection but denies fever,     ROS:   Pertinent positives and negatives are stated within HPI, all other systems reviewed and are negative.  --------------------------------------------- PAST HISTORY ---------------------------------------------  Past Medical History:  has no past medical history on file. Past Surgical History:  has no past surgical history on file. Social History:  reports that he has been smoking cigarettes. He has never used smokeless tobacco.    Family History: family history is not on file. The patients home medications have been reviewed. Allergies: Patient has no known allergies. -------------------------------------------------- RESULTS -------------------------------------------------  All laboratory and radiology results have been personally reviewed by myself   LABS:  No results found for this visit on 05/30/19. RADIOLOGY:  Interpreted by Radiologist.  No orders to display       ------------------------- NURSING NOTES AND VITALS REVIEWED ---------------------------   The nursing notes within the ED encounter and vital signs as below have been reviewed.    /83   Pulse 73   Temp 97.7 °F (36.5 °C) (Oral)   Resp 14   Ht 5' 9\" (1.753 m)   Wt 145 lb (65.8 kg)   SpO2 97%   BMI 21.41 kg/m²   Oxygen Saturation Interpretation: Normal      ---------------------------------------------------PHYSICAL EXAM--------------------------------------      Constitutional/General: Alert and oriented x3, well appearing, non toxic in NAD  Head: NC/AT  Eyes: PERRL, EOMI  Mouth: Oropharynx clear, handling secretions, no trismus  Neck: Supple, full ROM, no meningeal signs  Pulmonary: Lungs clear to auscultation bilaterally, no wheezes, rales, or rhonchi. Not in respiratory distress  Cardiovascular:  Regular rate and rhythm, no murmurs, gallops, or rubs. 2+ distal pulses  Abdomen: Soft, non tender, non distended,   Extremities: Moves all extremities x 4. Warm and well perfused, sutures in place and intact with well approximation of the laceration to the palm of the left hand. No surrounding erythema is noted. No drainage is noted. Skin: warm and dry without rash  Neurologic: GCS 15,  Psych: Normal Affect      ------------------------------ ED COURSE/MEDICAL DECISION MAKING----------------------  Medications   bacitracin ointment ( Topical Given 5/30/19 8136)         Medical Decision Making:    Patient advised to keep the sutures intact for 5 more days and return back in 5 days for suture removal. He will be started on antibiotics prophylactically. Wound care instructions provided. Counseling: The emergency provider has spoken with the patient and discussed todays results, in addition to providing specific details for the plan of care and counseling regarding the diagnosis and prognosis. Questions are answered at this time and they are agreeable with the plan.      --------------------------------- IMPRESSION AND DISPOSITION ---------------------------------    IMPRESSION  1.  Visit for wound check        DISPOSITION  Disposition: Discharge to home  Patient condition is good                 Yokasta Augustine, RASHEEDA - CNP  05/30/19 9942

## 2019-06-04 ENCOUNTER — HOSPITAL ENCOUNTER (EMERGENCY)
Age: 29
Discharge: HOME | End: 2019-06-04

## 2019-06-04 VITALS
HEART RATE: 86 BPM | RESPIRATION RATE: 14 BRPM | BODY MASS INDEX: 22.22 KG/M2 | HEIGHT: 69 IN | TEMPERATURE: 97.8 F | DIASTOLIC BLOOD PRESSURE: 79 MMHG | WEIGHT: 150 LBS | OXYGEN SATURATION: 98 % | SYSTOLIC BLOOD PRESSURE: 131 MMHG

## 2019-06-04 DIAGNOSIS — Z48.02 VISIT FOR SUTURE REMOVAL: Primary | ICD-10-CM

## 2019-06-04 PROCEDURE — 6370000000 HC RX 637 (ALT 250 FOR IP): Performed by: PHYSICIAN ASSISTANT

## 2019-06-04 PROCEDURE — 99281 EMR DPT VST MAYX REQ PHY/QHP: CPT

## 2019-06-04 RX ORDER — IBUPROFEN 800 MG/1
800 TABLET ORAL EVERY 8 HOURS PRN
Qty: 12 TABLET | Refills: 0 | Status: SHIPPED | OUTPATIENT
Start: 2019-06-04 | End: 2019-07-18

## 2019-06-04 RX ORDER — CEPHALEXIN 500 MG/1
500 CAPSULE ORAL 4 TIMES DAILY
Qty: 40 CAPSULE | Refills: 0 | Status: SHIPPED | OUTPATIENT
Start: 2019-06-04 | End: 2019-06-14

## 2019-06-04 RX ORDER — CEPHALEXIN 500 MG/1
500 CAPSULE ORAL ONCE
Status: COMPLETED | OUTPATIENT
Start: 2019-06-04 | End: 2019-06-04

## 2019-06-04 RX ADMIN — CEPHALEXIN 500 MG: 500 CAPSULE ORAL at 16:09

## 2019-06-04 NOTE — ED PROVIDER NOTES
visit on 06/04/19. RADIOLOGY:  Interpreted by Radiologist.  No orders to display       ------------------------- NURSING NOTES AND VITALS REVIEWED ---------------------------   The nursing notes within the ED encounter and vital signs as below have been reviewed. /79   Pulse 86   Temp 97.8 °F (36.6 °C) (Oral)   Resp 14   Ht 5' 9\" (1.753 m)   Wt 150 lb (68 kg)   SpO2 98%   BMI 22.15 kg/m²   Oxygen Saturation Interpretation: Normal      ---------------------------------------------------PHYSICAL EXAM--------------------------------------      Constitutional/General: Alert and oriented x3, well appearing, non toxic in NAD  Head: NC/AT  Eyes: PERRL, EOMI  Mouth: Oropharynx clear, handling secretions, no trismus  Neck: Supple, full ROM, no meningeal signs  Pulmonary: Lungs clear to auscultation bilaterally, no wheezes, rales, or rhonchi. Not in respiratory distress  Cardiovascular:  Regular rate and rhythm, no murmurs, gallops, or rubs. 2+ distal pulses  Abdomen: Soft, non tender, non distended,   Extremities: Moves all extremities x 4. Warm and well perfused. Range of motion of the left 5th digit is painful almost limited with fully extending it. The digit seems to walk at times. The strength  With flexion and extension is intact. There is decreased sensation to the left 5th digit and into the medial aspect of the palm. Skin: warm and dry without rash. 10 sutures intact on the left medial hand. No drainage from the area. Mild dehiscence of wound. Negative warmth or erythema. Negative streaking up the arm. Neurologic: GCS 15,  Psych: Normal Affect      ------------------------------ ED COURSE/MEDICAL DECISION MAKING----------------------  Medications - No data to display      Medical Decision Making:    he was seen independently. We will  discharge the patient home with referral to a hand surgeon. I placed him on prophylactic antibiotics. He has not been on any.  When removing one of the stitches underneath where the stitch was small amount of drainage did come out of it. Nothing coming from the laceration but more from where the stitch was inserted. She denies fevers, chills,  Night sweats. He states that the area is very painful. He is on Neurontin and the son seem to be helping much. I discussed warning signs and patient should return for any worsening symptoms. Counseling: The emergency provider has spoken with the patient and discussed todays results, in addition to providing specific details for the plan of care and counseling regarding the diagnosis and prognosis. Questions are answered at this time and they are agreeable with the plan.      --------------------------------- IMPRESSION AND DISPOSITION ---------------------------------    IMPRESSION  1.  Visit for suture removal New Problem       DISPOSITION  Disposition: Discharge to home  Patient condition is stable                 La Nena Barbourma  06/04/19 1506

## 2019-06-05 ENCOUNTER — OFFICE VISIT (OUTPATIENT)
Dept: FAMILY MEDICINE CLINIC | Age: 29
End: 2019-06-05

## 2019-06-05 VITALS
HEIGHT: 69 IN | BODY MASS INDEX: 22.22 KG/M2 | WEIGHT: 150 LBS | RESPIRATION RATE: 16 BRPM | TEMPERATURE: 98.1 F | SYSTOLIC BLOOD PRESSURE: 130 MMHG | DIASTOLIC BLOOD PRESSURE: 80 MMHG | HEART RATE: 105 BPM | OXYGEN SATURATION: 97 %

## 2019-06-05 DIAGNOSIS — S61.412D LACERATION OF LEFT HAND WITHOUT FOREIGN BODY, SUBSEQUENT ENCOUNTER: Primary | ICD-10-CM

## 2019-06-05 PROCEDURE — 99213 OFFICE O/P EST LOW 20 MIN: CPT | Performed by: NURSE PRACTITIONER

## 2019-06-05 RX ORDER — MIRTAZAPINE 15 MG/1
TABLET, FILM COATED ORAL
Refills: 0 | Status: ON HOLD | COMMUNITY
Start: 2019-05-28 | End: 2019-07-29 | Stop reason: HOSPADM

## 2019-06-05 NOTE — PROGRESS NOTES
Kahlil Newell is a 25 y.o. male who presents today for   Chief Complaint   Patient presents with    Wound Check     left hand         HPI      Pt presents today requesting to have a wound check on laceration of left hand. Pt was initially seen in the ED on 5/30/19 and had sutures administered for a possible self inflicted laceration according to ED report. Pt then returned back to the ED yesterday to have sutures removed and possible infection. Sutures were removed and pt was also started on Keflex. Pt stated he did start taking Keflex yesterday. Pt is concerned about possible infection. He denies any active drainage, fevers, edema/redness or warmth to affected area. Pt was referred to Ortho Hand-Dr. Jimmy Verdin for further evaluation however, pt has not yet completed medical assistance paperwork to obtain [address]:  Patient's past medical, surgical, social and/or family history reviewed, updated in chart, and are non-contributory (unless otherwise stated). Medications and allergies also reviewed and updated in chart. Review of Systems  Review of Systems   Constitutional: Negative for chills, diaphoresis and fever. Eyes: Negative for photophobia, pain, discharge, redness, itching and visual disturbance. Respiratory: Negative for cough, shortness of breath, wheezing and stridor. Cardiovascular: Negative for chest pain, palpitations and leg swelling. Gastrointestinal: Negative for abdominal pain, diarrhea, nausea and vomiting. Skin: Positive for wound (Left Hand Laceration). Negative for rash. Physical Exam:    VS:  /80   Pulse 105   Temp 98.1 °F (36.7 °C)   Resp 16   Ht 5' 9\" (1.753 m)   Wt 150 lb (68 kg)   SpO2 97%   BMI 22.15 kg/m²   LAST WEIGHT:  Wt Readings from Last 3 Encounters:   06/05/19 150 lb (68 kg)   06/04/19 150 lb (68 kg)   05/30/19 145 lb (65.8 kg)     Physical Exam   Constitutional: He appears well-developed and well-nourished.  No distress. Eyes: Pupils are equal, round, and reactive to light. Conjunctivae and EOM are normal. Right eye exhibits no discharge. Left eye exhibits no discharge. Neck: Normal range of motion. Neck supple. Cardiovascular: Normal rate, regular rhythm, normal heart sounds and intact distal pulses. Pulmonary/Chest: Effort normal and breath sounds normal. No stridor. No respiratory distress. He has no wheezes. He has no rales. He exhibits no tenderness. Abdominal: Soft. Bowel sounds are normal. He exhibits no distension. There is no tenderness. Lymphadenopathy:     He has no cervical adenopathy. Skin: Skin is warm and dry. He is not diaphoretic.   slightly reddened laceration present to left medial region of hand, small amount of yellow crusting present. Skin cool to touch, mildly tender, normal sensation   Nursing note and vitals reviewed. Assessment / Plan:      Gerri Cervantes was seen today for wound check. Diagnoses and all orders for this visit:    Laceration of left hand without foreign body, subsequent encounter  Advised to continue Keflex as ordered  Keep area clean w/antibacterial soap  Applied bacitracin and sterile dressing  Schedule appointment with Ortho -Hand as previously ordered       Call or go to ED immediately if symptoms worsen or persist.    Return if symptoms worsen or fail to improve. , or sooner if necessary. Educational materials and/or home exercises printed for patient's review and were included in patient instructions on his/her After Visit Summary and given to patient at the end of visit. Counseled regarding above diagnosis, including possible risks and complications,  especially if left uncontrolled. Counseled regarding the possible side effects, risks, benefits and alternatives to treatment; patient and/or guardian verbalizes understanding, agrees, feels comfortable with and wishes to proceed with above treatment plan.     Advised patient to call with any new medication issues, and read all Rx info from pharmacy to assure aware of all possible risks and side effects of medication before taking. Reviewed age and gender appropriate health screening exams and vaccinations. Advised patient regarding importance of keeping up with recommended health maintenance and to schedule as soon as possible if overdue, as this is important in assessing for undiagnosed pathology, especially cancer, as well as protecting against potentially harmful/life threatening disease. Patient and/or guardian verbalizes understanding and agrees with above counseling, assessment and plan. All questions answered.     Ingrid Borrego, APRN - CNP

## 2019-06-06 ASSESSMENT — ENCOUNTER SYMPTOMS
VOMITING: 0
WHEEZING: 0
EYE REDNESS: 0
SHORTNESS OF BREATH: 0
PHOTOPHOBIA: 0
DIARRHEA: 0
COUGH: 0
EYE PAIN: 0
STRIDOR: 0
ABDOMINAL PAIN: 0
EYE ITCHING: 0
NAUSEA: 0
EYE DISCHARGE: 0

## 2019-07-18 ENCOUNTER — HOSPITAL ENCOUNTER (EMERGENCY)
Age: 29
Discharge: HOME | End: 2019-07-18

## 2019-07-18 ENCOUNTER — APPOINTMENT (OUTPATIENT)
Dept: GENERAL RADIOLOGY | Age: 29
End: 2019-07-18

## 2019-07-18 VITALS
WEIGHT: 170 LBS | SYSTOLIC BLOOD PRESSURE: 132 MMHG | HEIGHT: 69 IN | HEART RATE: 98 BPM | TEMPERATURE: 98.5 F | BODY MASS INDEX: 25.18 KG/M2 | DIASTOLIC BLOOD PRESSURE: 81 MMHG | RESPIRATION RATE: 16 BRPM | OXYGEN SATURATION: 96 %
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
## 2019-07-18 DIAGNOSIS — S60.222A CONTUSION OF LEFT HAND, INITIAL ENCOUNTER: ICD-10-CM

## 2019-07-18 DIAGNOSIS — T14.8XXA ABRASION: ICD-10-CM

## 2019-07-18 DIAGNOSIS — S80.02XA CONTUSION OF LEFT KNEE, INITIAL ENCOUNTER: ICD-10-CM

## 2019-07-18 DIAGNOSIS — S50.01XA CONTUSION OF RIGHT ELBOW, INITIAL ENCOUNTER: ICD-10-CM

## 2019-07-18 PROCEDURE — 99283 EMERGENCY DEPT VISIT LOW MDM: CPT

## 2019-07-18 PROCEDURE — 73130 X-RAY EXAM OF HAND: CPT

## 2019-07-18 PROCEDURE — 6370000000 HC RX 637 (ALT 250 FOR IP): Performed by: NURSE PRACTITIONER

## 2019-07-18 RX ORDER — IBUPROFEN 800 MG/1
800 TABLET ORAL EVERY 6 HOURS PRN
Qty: 21 TABLET | Refills: 0 | Status: SHIPPED | OUTPATIENT
Start: 2019-07-18 | End: 2021-06-12 | Stop reason: ALTCHOICE

## 2019-07-18 RX ORDER — DIAPER,BRIEF,INFANT-TODD,DISP
EACH MISCELLANEOUS ONCE
Status: COMPLETED | OUTPATIENT
Start: 2019-07-18 | End: 2019-07-18

## 2019-07-18 RX ORDER — IBUPROFEN 800 MG/1
800 TABLET ORAL ONCE
Status: COMPLETED | OUTPATIENT
Start: 2019-07-18 | End: 2019-07-18

## 2019-07-18 RX ADMIN — BACITRACIN: 500 OINTMENT TOPICAL at 00:40

## 2019-07-18 RX ADMIN — IBUPROFEN 800 MG: 800 TABLET, FILM COATED ORAL at 00:40

## 2019-07-18 ASSESSMENT — PAIN DESCRIPTION - DESCRIPTORS: DESCRIPTORS: THROBBING

## 2019-07-18 ASSESSMENT — PAIN DESCRIPTION - FREQUENCY: FREQUENCY: CONTINUOUS

## 2019-07-18 ASSESSMENT — PAIN SCALES - GENERAL: PAINLEVEL_OUTOF10: 7

## 2019-07-18 ASSESSMENT — PAIN DESCRIPTION - ORIENTATION: ORIENTATION: LEFT

## 2019-07-18 ASSESSMENT — PAIN DESCRIPTION - PAIN TYPE: TYPE: ACUTE PAIN

## 2019-07-18 ASSESSMENT — PAIN DESCRIPTION - LOCATION: LOCATION: HAND;KNEE

## 2019-07-26 ENCOUNTER — HOSPITAL ENCOUNTER (INPATIENT)
Age: 29
LOS: 2 days | Discharge: HOME | DRG: 881 | End: 2019-07-29
Attending: EMERGENCY MEDICINE | Admitting: PSYCHIATRY & NEUROLOGY

## 2019-07-26 DIAGNOSIS — F10.920 ACUTE ALCOHOLIC INTOXICATION WITHOUT COMPLICATION (HCC): ICD-10-CM

## 2019-07-26 DIAGNOSIS — R45.851 SUICIDAL IDEATION: Primary | ICD-10-CM

## 2019-07-26 LAB
ACETAMINOPHEN LEVEL: <5 MCG/ML (ref 10–30)
AMPHETAMINE SCREEN, URINE: NOT DETECTED
ANION GAP SERPL CALCULATED.3IONS-SCNC: 9 MMOL/L (ref 7–16)
BACTERIA: NORMAL /HPF
BARBITURATE SCREEN URINE: NOT DETECTED
BASOPHILS ABSOLUTE: 0.05 E9/L (ref 0–0.2)
BASOPHILS RELATIVE PERCENT: 0.8 % (ref 0–2)
BENZODIAZEPINE SCREEN, URINE: NOT DETECTED
BILIRUBIN URINE: NEGATIVE
BLOOD, URINE: NEGATIVE
BUN BLDV-MCNC: 9 MG/DL (ref 6–20)
CALCIUM SERPL-MCNC: 9.4 MG/DL (ref 8.6–10.2)
CANNABINOID SCREEN URINE: NOT DETECTED
CHLORIDE BLD-SCNC: 103 MMOL/L (ref 98–107)
CLARITY: CLEAR
CO2: 29 MMOL/L (ref 22–29)
COCAINE METABOLITE SCREEN URINE: NOT DETECTED
COLOR: YELLOW
CREAT SERPL-MCNC: 0.8 MG/DL (ref 0.7–1.2)
EOSINOPHILS ABSOLUTE: 0.1 E9/L (ref 0.05–0.5)
EOSINOPHILS RELATIVE PERCENT: 1.5 % (ref 0–6)
ETHANOL: 159 MG/DL (ref 0–0.08)
GFR AFRICAN AMERICAN: >60
GFR NON-AFRICAN AMERICAN: >60 ML/MIN/1.73
GLUCOSE BLD-MCNC: 143 MG/DL (ref 74–99)
GLUCOSE URINE: NEGATIVE MG/DL
HCT VFR BLD CALC: 48.9 % (ref 37–54)
HEMOGLOBIN: 15.8 G/DL (ref 12.5–16.5)
IMMATURE GRANULOCYTES #: 0.03 E9/L
IMMATURE GRANULOCYTES %: 0.5 % (ref 0–5)
KETONES, URINE: NEGATIVE MG/DL
LEUKOCYTE ESTERASE, URINE: NEGATIVE
LYMPHOCYTES ABSOLUTE: 2.19 E9/L (ref 1.5–4)
LYMPHOCYTES RELATIVE PERCENT: 33.6 % (ref 20–42)
MCH RBC QN AUTO: 29.8 PG (ref 26–35)
MCHC RBC AUTO-ENTMCNC: 32.3 % (ref 32–34.5)
MCV RBC AUTO: 92.1 FL (ref 80–99.9)
METHADONE SCREEN, URINE: NOT DETECTED
MONOCYTES ABSOLUTE: 0.42 E9/L (ref 0.1–0.95)
MONOCYTES RELATIVE PERCENT: 6.4 % (ref 2–12)
NEUTROPHILS ABSOLUTE: 3.73 E9/L (ref 1.8–7.3)
NEUTROPHILS RELATIVE PERCENT: 57.2 % (ref 43–80)
NITRITE, URINE: NEGATIVE
OPIATE SCREEN URINE: NOT DETECTED
PDW BLD-RTO: 13.3 FL (ref 11.5–15)
PH UA: 6 (ref 5–9)
PHENCYCLIDINE SCREEN URINE: NOT DETECTED
PLATELET # BLD: 272 E9/L (ref 130–450)
PMV BLD AUTO: 10.5 FL (ref 7–12)
POTASSIUM SERPL-SCNC: 4.5 MMOL/L (ref 3.5–5)
PROPOXYPHENE SCREEN: NOT DETECTED
PROTEIN UA: NEGATIVE MG/DL
RBC # BLD: 5.31 E12/L (ref 3.8–5.8)
RBC UA: NORMAL /HPF (ref 0–2)
SALICYLATE, SERUM: <0.3 MG/DL (ref 0–30)
SODIUM BLD-SCNC: 141 MMOL/L (ref 132–146)
SPECIFIC GRAVITY UA: 1.01 (ref 1–1.03)
TRICYCLIC ANTIDEPRESSANTS SCREEN SERUM: NEGATIVE NG/ML
UROBILINOGEN, URINE: 0.2 E.U./DL
WBC # BLD: 6.5 E9/L (ref 4.5–11.5)
WBC UA: NORMAL /HPF (ref 0–5)

## 2019-07-26 PROCEDURE — G0480 DRUG TEST DEF 1-7 CLASSES: HCPCS

## 2019-07-26 PROCEDURE — 36415 COLL VENOUS BLD VENIPUNCTURE: CPT

## 2019-07-26 PROCEDURE — 81001 URINALYSIS AUTO W/SCOPE: CPT

## 2019-07-26 PROCEDURE — 80048 BASIC METABOLIC PNL TOTAL CA: CPT

## 2019-07-26 PROCEDURE — 80307 DRUG TEST PRSMV CHEM ANLYZR: CPT

## 2019-07-26 PROCEDURE — 85025 COMPLETE CBC W/AUTO DIFF WBC: CPT

## 2019-07-26 PROCEDURE — 99285 EMERGENCY DEPT VISIT HI MDM: CPT

## 2019-07-26 ASSESSMENT — ENCOUNTER SYMPTOMS
CONSTIPATION: 0
ABDOMINAL PAIN: 0
VOMITING: 0
SHORTNESS OF BREATH: 0
COUGH: 0
NAUSEA: 0
BLOOD IN STOOL: 0
BACK PAIN: 0
DIARRHEA: 0
WHEEZING: 0

## 2019-07-26 NOTE — LETTER
[address]  [address] [facility] [address]  Phone: [phone]          July 29, 2019     Patient: Rachel Lantigua   YOB: 1990   Date of Visit: 7/26/2019       To Whom it May Concern:    Ayde Post has been hospitalized at [facility] in [place] [facility] from 7/26/19 through 7/29/19. Please excuse him from work the following days.       Sincerely,               Ashwin Ma., MSW, LSW

## 2019-07-27 PROBLEM — F33.2 MAJOR DEPRESSIVE DISORDER, RECURRENT, SEVERE WITHOUT PSYCHOTIC FEATURES (HCC): Status: ACTIVE | Noted: 2019-07-27

## 2019-07-27 PROCEDURE — 1240000000 HC EMOTIONAL WELLNESS R&B

## 2019-07-27 PROCEDURE — 6370000000 HC RX 637 (ALT 250 FOR IP): Performed by: NURSE PRACTITIONER

## 2019-07-27 RX ORDER — TRAZODONE HYDROCHLORIDE 50 MG/1
50 TABLET ORAL NIGHTLY PRN
Status: DISCONTINUED | OUTPATIENT
Start: 2019-07-27 | End: 2019-07-29 | Stop reason: HOSPADM

## 2019-07-27 RX ORDER — ACETAMINOPHEN 325 MG/1
650 TABLET ORAL EVERY 4 HOURS PRN
Status: DISCONTINUED | OUTPATIENT
Start: 2019-07-27 | End: 2019-07-29 | Stop reason: HOSPADM

## 2019-07-27 RX ORDER — BENZTROPINE MESYLATE 1 MG/ML
2 INJECTION INTRAMUSCULAR; INTRAVENOUS 2 TIMES DAILY PRN
Status: DISCONTINUED | OUTPATIENT
Start: 2019-07-27 | End: 2019-07-29 | Stop reason: HOSPADM

## 2019-07-27 RX ORDER — HYDROXYZINE PAMOATE 50 MG/1
50 CAPSULE ORAL 3 TIMES DAILY PRN
Status: DISCONTINUED | OUTPATIENT
Start: 2019-07-27 | End: 2019-07-29 | Stop reason: HOSPADM

## 2019-07-27 RX ORDER — OLANZAPINE 10 MG/1
10 INJECTION, POWDER, LYOPHILIZED, FOR SOLUTION INTRAMUSCULAR EVERY 4 HOURS PRN
Status: DISCONTINUED | OUTPATIENT
Start: 2019-07-27 | End: 2019-07-29 | Stop reason: HOSPADM

## 2019-07-27 RX ORDER — OLANZAPINE 5 MG/1
5 TABLET ORAL EVERY 4 HOURS PRN
Status: DISCONTINUED | OUTPATIENT
Start: 2019-07-27 | End: 2019-07-29 | Stop reason: HOSPADM

## 2019-07-27 RX ORDER — NICOTINE 21 MG/24HR
1 PATCH, TRANSDERMAL 24 HOURS TRANSDERMAL DAILY
Status: DISCONTINUED | OUTPATIENT
Start: 2019-07-27 | End: 2019-07-29 | Stop reason: HOSPADM

## 2019-07-27 RX ORDER — MAGNESIUM HYDROXIDE/ALUMINUM HYDROXICE/SIMETHICONE 120; 1200; 1200 MG/30ML; MG/30ML; MG/30ML
30 SUSPENSION ORAL PRN
Status: DISCONTINUED | OUTPATIENT
Start: 2019-07-27 | End: 2019-07-29 | Stop reason: HOSPADM

## 2019-07-27 RX ADMIN — TRAZODONE HYDROCHLORIDE 50 MG: 50 TABLET ORAL at 21:15

## 2019-07-27 ASSESSMENT — LIFESTYLE VARIABLES: HISTORY_ALCOHOL_USE: NO

## 2019-07-27 ASSESSMENT — SLEEP AND FATIGUE QUESTIONNAIRES
AVERAGE NUMBER OF SLEEP HOURS: 6
DO YOU USE A SLEEP AID: NO
DO YOU HAVE DIFFICULTY SLEEPING: NO

## 2019-07-27 ASSESSMENT — PAIN - FUNCTIONAL ASSESSMENT: PAIN_FUNCTIONAL_ASSESSMENT: 0-10

## 2019-07-27 NOTE — PROGRESS NOTES
`[facility]  Admission Note     Admission Type:   Admission Type: Involuntary    Reason for admission:  Reason for Admission: \"discharging a firearm in city limits\"    PATIENT STRENGTHS:  Strengths: No significant Physical Illness, Employment    Patient Strengths and Limitations:       Addictive Behavior:   Addictive Behavior  In the past 3 months, have you felt or has someone told you that you have a problem with:  : (DENIES)  Do you have a history of Chemical Use?: Yes  Do you have a history of Alcohol Use?: No  Do you have a history of Street Drug Abuse?: Yes  Histroy of Prescripton Drug Abuse?: No    Medical Problems:   History reviewed. No pertinent past medical history.     Status EXAM:  Status and Exam  Normal: Yes  Affect: Appropriate  Level of Consciousness: Alert  Mood:Normal: Yes  Motor Activity:Normal: Yes  Interview Behavior: Cooperative  Preception: [place] to Person, [place] to Time, [place] to Place, [place] to Situation  Attention:Normal: No  Thought Processes: Circumstantial  Thought Content:Normal: Yes  Hallucinations: None  Delusions: No  Memory:Normal: Yes  Insight and Judgment: No  Insight and Judgment: Poor Judgment  Present Suicidal Ideation: No  Present Homicidal Ideation: No    Tobacco Screening:  Practical Counseling, on admission, nadege X, if applicable and completed (first 3 are required if patient doesn't refuse):            (X )  Recognizing danger situations (included triggers and roadblocks)                    (X )  Coping skills (new ways to manage stress, exercise, relaxation techniques, changing routine, distraction)                                                           (X )  Basic information about quitting (benefits of quitting, techniques in how to quit, available resources  ( ) Referral for counseling faxed to Jennifer                                           (X ) Patient refused counseling  ( ) Patient has not smoked in the last 30

## 2019-07-27 NOTE — ED NOTES
Assessment, CSSR & SBIRT complete    Pt is a 24 yo male who presents via ambulance, pt is on a pink slip from [facility], accompanied by no one. Per triage: pink slipped by [place] [employer] due to being intoxicated and shooting AR into ground, pt denies being suicidal or homicidal, calm and cooperative at this time. Per pt: \"I was a little drunk, I had 3 or 4 beers. I wanted to shoot off the gun, I had just got it. [place] deal.  I knew it's illegal to shoot off guns in the city, it was a stupid thing to do but. .. \"     Reports that police were called by neighbors and when police arrived they asked him if he was thinking about harming himself. States: \"I wasn't going to lie to them, I had been thinking about my best friend who  of an overdose May of 2018. I told them yeah I was abel down. \" Pt currently specifically denies suicidal ideation intent or plan, reports hx of suicidal ideation in past but denies acting on it, denies homicidal ideation intent or plan. Pt reports active in counseling at [facility], hx of MDD dx, reports Remeron was prescribed initially but not currently. Hx one psych admit: 7-S 2019. Reports hx of alcohol and opiate use but no hx treatment. States: \"When my friend overdosed it forced me to take a good look at my life. I saw what him dying did to his daughters. I didn't want that to happen top my parents so I went cold turkey. 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 \"  Tox screen clear. Alert, oriented x4, mood stable, affect broad, eye contact good, behavior is cooperative, appropriate, no signs of agitation, speech normal in rate flow and volume. Thought content clear, thought form logical. Denies acute psychosis, A/V hallucinations, delusions, paranoia. None evident in interview. Insight and judgement intact.     Discussion with Dr Annalisa Juárez, pt presentation is stable, pink slip by [facility] however is

## 2019-07-27 NOTE — ED NOTES
PER SAMEER RN [facility], PATIENT IS ACCEPTED TO [address] [place] [address] BY DR. Susan Li. PATIENT TO GO TO ROOM #[id number]. CALLED ADMITTING AND NOTIFIED Lincoln Millan.      DEDE Augustin, LSW  07/27/19 1158

## 2019-07-28 PROBLEM — F33.3 MDD (MAJOR DEPRESSIVE DISORDER), RECURRENT, SEVERE, WITH PSYCHOSIS (HCC): Status: ACTIVE | Noted: 2019-07-28

## 2019-07-28 LAB
CHOLESTEROL, TOTAL: 186 MG/DL (ref 0–199)
HBA1C MFR BLD: 5.1 % (ref 4–5.6)
HDLC SERPL-MCNC: 65 MG/DL
LDL CHOLESTEROL CALCULATED: 101 MG/DL (ref 0–99)
TRIGL SERPL-MCNC: 98 MG/DL (ref 0–149)
VLDLC SERPL CALC-MCNC: 20 MG/DL

## 2019-07-28 PROCEDURE — 36415 COLL VENOUS BLD VENIPUNCTURE: CPT

## 2019-07-28 PROCEDURE — 99221 1ST HOSP IP/OBS SF/LOW 40: CPT | Performed by: NURSE PRACTITIONER

## 2019-07-28 PROCEDURE — 6370000000 HC RX 637 (ALT 250 FOR IP): Performed by: NURSE PRACTITIONER

## 2019-07-28 PROCEDURE — 83036 HEMOGLOBIN GLYCOSYLATED A1C: CPT

## 2019-07-28 PROCEDURE — 80061 LIPID PANEL: CPT

## 2019-07-28 PROCEDURE — 1240000000 HC EMOTIONAL WELLNESS R&B

## 2019-07-28 RX ORDER — IBUPROFEN 400 MG/1
800 TABLET ORAL EVERY 6 HOURS PRN
Status: DISCONTINUED | OUTPATIENT
Start: 2019-07-28 | End: 2019-07-29 | Stop reason: HOSPADM

## 2019-07-28 RX ORDER — MIRTAZAPINE 15 MG/1
30 TABLET, FILM COATED ORAL NIGHTLY
Status: DISCONTINUED | OUTPATIENT
Start: 2019-07-28 | End: 2019-07-29 | Stop reason: HOSPADM

## 2019-07-28 RX ADMIN — ACETAMINOPHEN 650 MG: 325 TABLET, FILM COATED ORAL at 12:16

## 2019-07-28 RX ADMIN — IBUPROFEN 800 MG: 400 TABLET ORAL at 14:39

## 2019-07-28 RX ADMIN — MIRTAZAPINE 30 MG: 15 TABLET, FILM COATED ORAL at 20:44

## 2019-07-28 ASSESSMENT — PAIN SCALES - GENERAL
PAINLEVEL_OUTOF10: 2
PAINLEVEL_OUTOF10: 3
PAINLEVEL_OUTOF10: 0
PAINLEVEL_OUTOF10: 0

## 2019-07-28 ASSESSMENT — LIFESTYLE VARIABLES: HISTORY_ALCOHOL_USE: NO

## 2019-07-28 ASSESSMENT — SLEEP AND FATIGUE QUESTIONNAIRES
DO YOU USE A SLEEP AID: NO
DO YOU HAVE DIFFICULTY SLEEPING: NO
AVERAGE NUMBER OF SLEEP HOURS: 6

## 2019-07-28 NOTE — PROGRESS NOTES
[facility]  Initial Interdisciplinary Treatment Plan NOTE    Review Date & Time: 07/28/19 1016    Patient was in treatment team    Admission Type:   Admission Type: Involuntary    Reason for admission:  Reason for Admission: \"discharging a firearm in city limits\"      Estimated Length of Stay Update:  3-5 days  Estimated Discharge Date Update: 5-8 days    PATIENT STRENGTHS:  Patient Strengths Strengths: Communication, Positive Support, Social Skills, Employment  Patient Strengths and Limitations:Limitations: Tendency to isolate self  Addictive Behavior:Addictive Behavior  In the past 3 months, have you felt or has someone told you that you have a problem with:  : None  Do you have a history of Chemical Use?: Yes  Do you have a history of Alcohol Use?: No  Do you have a history of Street Drug Abuse?: Yes  Histroy of Prescripton Drug Abuse?: No  Medical Problems:History reviewed. No pertinent past medical history.     EDUCATION:   Learner Progress Toward Treatment Goals: progressing    Method: follow care plan    Outcome: meet goals and return home    PATIENT GOALS: \"to help others\"                                                RECOMENDATIONS UPDATE: CONTINUE PRESENT CARE PLAN  gOALS UPDATE:   Time frame for Short-Termgoals: 3-5 days    Caesar Coleman RN

## 2019-07-28 NOTE — GROUP NOTE
Group Therapy Note    Date: July 28    Group Start Time: 1115  Group End Time: 1200  Group Topic: Psychoeducation    [facility]  [address] [place], [place]        Group Therapy Note    Attendees: 19     Notes: Active and pleasant during discussion on mental health toolbox. Able to share and support peers. Status After Intervention:  Improved    Participation Level:  Active Listener and Interactive    Participation Quality: Appropriate, Attentive, Sharing and Supportive      Speech:  normal      Thought Process/Content: Logical      Affective Functioning: Congruent      Mood: euthymic      Level of consciousness:  Alert and Attentive      Response to Learning: Capable of insight, Able to change behavior and Progressing to goal      Endings: None Reported    Modes of Intervention: Education, Support, Socialization and Exploration      Discipline Responsible: Psychoeducational Specialist      Signature:  Alley Price [place]
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
## 2019-07-28 NOTE — CARE COORDINATION
SW met with pt to complete assessment and C-SSRS. Pt was cooperative and friendly during assessment. Pt followed up at [address] but stopped going and stopped taking medications. Pt reports good supports and lives with girlfriend. Pt has 4 children to 4 different different moms and reports child support as stressors. Pt works at [employer]. Pt reports no legal issues and abuse history. Pt reports history of substance abuse although states a friend overdosed and this motivated him and he has been clean for 10 months. Pt reports he had a previous admission in May 2019 and acknowledges that when he drinks \"I get myself into trouble. \"  Pt states he does not drink often.

## 2019-07-28 NOTE — GROUP NOTE
Group Therapy Note    Date: July 27    Group Start Time: 1900  Group End Time: 2000  Group Topic: Healthy Living/Wellness    [facility] 1    Viki Roberts RN        Group Therapy Note    Patient attended and participated in Wellness Group.      Attendees: 18           Signature:  Viki Roberts RN

## 2019-07-28 NOTE — GROUP NOTE
Group Therapy Note    Date: July 27    Group Start Time: 2000  Group End Time: 2030  Group Topic: Wrap-Up    [facility]  1    Katharina Pelayo RN        Group Therapy Note    Patient attended [address] [facility] group.      Attendees: 18         Signature:  Katharina Pelayo RN

## 2019-07-29 VITALS
TEMPERATURE: 97.8 F | WEIGHT: 170 LBS | HEART RATE: 57 BPM | SYSTOLIC BLOOD PRESSURE: 106 MMHG | OXYGEN SATURATION: 98 % | DIASTOLIC BLOOD PRESSURE: 65 MMHG | BODY MASS INDEX: 24.34 KG/M2 | HEIGHT: 70 IN | RESPIRATION RATE: 16 BRPM

## 2019-07-29 PROCEDURE — 99238 HOSP IP/OBS DSCHRG MGMT 30/<: CPT | Performed by: NURSE PRACTITIONER

## 2019-07-29 PROCEDURE — 6370000000 HC RX 637 (ALT 250 FOR IP): Performed by: NURSE PRACTITIONER

## 2019-07-29 RX ORDER — NICOTINE 21 MG/24HR
1 PATCH, TRANSDERMAL 24 HOURS TRANSDERMAL DAILY
Qty: 30 PATCH | Refills: 0 | Status: SHIPPED | OUTPATIENT
Start: 2019-07-30 | End: 2021-06-12 | Stop reason: ALTCHOICE

## 2019-07-29 RX ORDER — MIRTAZAPINE 30 MG/1
30 TABLET, FILM COATED ORAL NIGHTLY
Qty: 30 TABLET | Refills: 0 | Status: SHIPPED | OUTPATIENT
Start: 2019-07-29 | End: 2021-06-12 | Stop reason: ALTCHOICE

## 2019-07-29 RX ADMIN — IBUPROFEN 800 MG: 400 TABLET ORAL at 11:02

## 2019-07-29 ASSESSMENT — PAIN DESCRIPTION - FREQUENCY: FREQUENCY: CONTINUOUS

## 2019-07-29 ASSESSMENT — PAIN DESCRIPTION - LOCATION: LOCATION: HAND

## 2019-07-29 ASSESSMENT — PAIN SCALES - GENERAL
PAINLEVEL_OUTOF10: 0
PAINLEVEL_OUTOF10: 3
PAINLEVEL_OUTOF10: 0

## 2019-07-29 ASSESSMENT — PAIN DESCRIPTION - PAIN TYPE: TYPE: CHRONIC PAIN

## 2019-07-29 ASSESSMENT — PAIN DESCRIPTION - ONSET: ONSET: ON-GOING

## 2019-07-29 ASSESSMENT — PAIN DESCRIPTION - ORIENTATION: ORIENTATION: LEFT

## 2019-07-29 ASSESSMENT — PAIN DESCRIPTION - DESCRIPTORS: DESCRIPTORS: THROBBING

## 2019-07-29 NOTE — GROUP NOTE
Group Therapy Note    Date: July 29    Group Start Time: 1010  Group End Time: 1045  Group Topic: Psychoeducation    [facility]    Mae Adams, CTRS        Group Therapy Note  Number of participants: 14  Type of group: Psychoeducation  Mode of intervention: Education, Support, Socialization, Exploration, Clarifying and Problem-solving  Topic: 48812 Grounding Technique   Objective:  Patient will identify ways to utilize the 95781 grounding technique in recovery. Notes:  Patient was interactive during group sharing ways to utilize the 58611 grounding technique in recovery. Patient gave support and feedback to others. Status After Intervention:  Improved    Participation Level:  Active Listener and Interactive    Participation Quality: Appropriate, Attentive, Sharing and Supportive      Speech:  normal      Thought Process/Content: Logical      Affective Functioning: Congruent      Mood: euthymic      Level of consciousness:  Alert, Oriented x4 and Attentive      Response to Learning: Able to verbalize current knowledge/experience, Able to verbalize/acknowledge new learning, Able to retain information, Capable of insight, Able to change behavior and Progressing to goal      Endings: None Reported    Modes of Intervention: Education, Support, Socialization, Exploration, Clarifying and Problem-solving

## 2019-07-29 NOTE — PROGRESS NOTES
[facility]  Discharge Note    Pt discharged with followings belongings:   Dentures: None  Vision - Corrective Lenses: None  Hearing Aid: None  Jewelry: None  Body Piercings Removed: No  Clothing: Pants, Shirt, Undergarments (Comment)  Were All Patient Medications Collected?: Not Applicable  Other Valuables: Cell phone, Other (Comment)   Valuables returned to patient.  Patient education on aftercare instructions: Patient verbalize understanding of AVS:  .    Status EXAM upon discharge:  Status and Exam  Normal: Yes(pt. sleeping)  Affect: Appropriate  Level of Consciousness: Alert  Mood:Normal: Yes  Motor Activity:Normal: Yes  Interview Behavior: Cooperative  Preception: [place] to Person, [place] to Time, [place] to Place, [place] to Situation  Attention:Normal: Yes  Thought Processes: Circumstantial  Thought Content:Normal: Yes  Hallucinations: None  Delusions: No  Memory:Normal: Yes  Insight and Judgment: No  Insight and Judgment: Poor Judgment  Present Suicidal Ideation: No  Present Homicidal Ideation: No      Metabolic Screening:    Lab Results   Component Value Date    LABA1C 5.1 07/28/2019       Lab Results   Component Value Date    CHOL 186 07/28/2019    CHOL 151 05/23/2019     Lab Results   Component Value Date    TRIG 98 07/28/2019    TRIG 152 (H) 05/23/2019     Lab Results   Component Value Date    HDL 65 07/28/2019    HDL 61 05/23/2019     No components found for: [facility] EVALUATION AND TREATMENT [place]  Lab Results   Component Value Date    LABVLDL 20 07/28/2019    LABVLDL 30 05/23/2019       Qamar Bone RN

## 2021-06-12 ENCOUNTER — HOSPITAL ENCOUNTER (EMERGENCY)
Age: 31
Discharge: HOME | End: 2021-06-12
Attending: EMERGENCY MEDICINE

## 2021-06-12 VITALS
HEART RATE: 70 BPM | DIASTOLIC BLOOD PRESSURE: 87 MMHG | OXYGEN SATURATION: 98 % | HEIGHT: 67 IN | TEMPERATURE: 98 F | WEIGHT: 170 LBS | BODY MASS INDEX: 26.68 KG/M2 | SYSTOLIC BLOOD PRESSURE: 136 MMHG | RESPIRATION RATE: 14 BRPM

## 2021-06-12 DIAGNOSIS — B07.9 VIRAL WARTS, UNSPECIFIED TYPE: ICD-10-CM

## 2021-06-12 DIAGNOSIS — F19.91 HISTORY OF DRUG USE DISORDER: ICD-10-CM

## 2021-06-12 DIAGNOSIS — R59.1 LYMPHADENOPATHY: Primary | ICD-10-CM

## 2021-06-12 LAB
ALBUMIN SERPL-MCNC: 4.6 G/DL (ref 3.5–5.2)
ALP BLD-CCNC: 72 U/L (ref 40–129)
ALT SERPL-CCNC: 15 U/L (ref 0–40)
AMPHETAMINE SCREEN, URINE: POSITIVE
ANION GAP SERPL CALCULATED.3IONS-SCNC: 10 MMOL/L (ref 7–16)
AST SERPL-CCNC: 17 U/L (ref 0–39)
BARBITURATE SCREEN URINE: NOT DETECTED
BASOPHILS ABSOLUTE: 0.05 E9/L (ref 0–0.2)
BASOPHILS RELATIVE PERCENT: 0.7 % (ref 0–2)
BENZODIAZEPINE SCREEN, URINE: NOT DETECTED
BILIRUB SERPL-MCNC: 0.5 MG/DL (ref 0–1.2)
BILIRUBIN URINE: ABNORMAL
BLOOD, URINE: NEGATIVE
BUN BLDV-MCNC: 13 MG/DL (ref 6–20)
CALCIUM SERPL-MCNC: 9.4 MG/DL (ref 8.6–10.2)
CANNABINOID SCREEN URINE: POSITIVE
CHLORIDE BLD-SCNC: 103 MMOL/L (ref 98–107)
CLARITY: CLEAR
CO2: 25 MMOL/L (ref 22–29)
COCAINE METABOLITE SCREEN URINE: NOT DETECTED
COLOR: YELLOW
CREAT SERPL-MCNC: 1 MG/DL (ref 0.7–1.2)
EOSINOPHILS ABSOLUTE: 0.04 E9/L (ref 0.05–0.5)
EOSINOPHILS RELATIVE PERCENT: 0.5 % (ref 0–6)
FENTANYL SCREEN, URINE: NOT DETECTED
GFR AFRICAN AMERICAN: >60
GFR NON-AFRICAN AMERICAN: >60 ML/MIN/1.73
GLUCOSE BLD-MCNC: 102 MG/DL (ref 74–99)
GLUCOSE URINE: NEGATIVE MG/DL
HCT VFR BLD CALC: 43.1 % (ref 37–54)
HEMOGLOBIN: 14.2 G/DL (ref 12.5–16.5)
IMMATURE GRANULOCYTES #: 0.01 E9/L
IMMATURE GRANULOCYTES %: 0.1 % (ref 0–5)
KETONES, URINE: 15 MG/DL
LEUKOCYTE ESTERASE, URINE: NEGATIVE
LYMPHOCYTES ABSOLUTE: 1.52 E9/L (ref 1.5–4)
LYMPHOCYTES RELATIVE PERCENT: 19.9 % (ref 20–42)
Lab: ABNORMAL
MCH RBC QN AUTO: 28.7 PG (ref 26–35)
MCHC RBC AUTO-ENTMCNC: 32.9 % (ref 32–34.5)
MCV RBC AUTO: 87.1 FL (ref 80–99.9)
METHADONE SCREEN, URINE: NOT DETECTED
MONO TEST: NEGATIVE
MONOCYTES ABSOLUTE: 0.83 E9/L (ref 0.1–0.95)
MONOCYTES RELATIVE PERCENT: 10.9 % (ref 2–12)
NEUTROPHILS ABSOLUTE: 5.19 E9/L (ref 1.8–7.3)
NEUTROPHILS RELATIVE PERCENT: 67.9 % (ref 43–80)
NITRITE, URINE: NEGATIVE
OPIATE SCREEN URINE: NOT DETECTED
OXYCODONE URINE: NOT DETECTED
PDW BLD-RTO: 13.2 FL (ref 11.5–15)
PH UA: 6 (ref 5–9)
PHENCYCLIDINE SCREEN URINE: NOT DETECTED
PLATELET # BLD: 221 E9/L (ref 130–450)
PMV BLD AUTO: 10.7 FL (ref 7–12)
POTASSIUM REFLEX MAGNESIUM: 3.9 MMOL/L (ref 3.5–5)
PROTEIN UA: NEGATIVE MG/DL
RBC # BLD: 4.95 E12/L (ref 3.8–5.8)
SODIUM BLD-SCNC: 138 MMOL/L (ref 132–146)
SPECIFIC GRAVITY UA: >=1.03 (ref 1–1.03)
TOTAL PROTEIN: 7.2 G/DL (ref 6.4–8.3)
TSH SERPL DL<=0.05 MIU/L-ACNC: 1.59 UIU/ML (ref 0.27–4.2)
UROBILINOGEN, URINE: 0.2 E.U./DL
WBC # BLD: 7.6 E9/L (ref 4.5–11.5)

## 2021-06-12 PROCEDURE — 85025 COMPLETE CBC W/AUTO DIFF WBC: CPT

## 2021-06-12 PROCEDURE — 86308 HETEROPHILE ANTIBODY SCREEN: CPT

## 2021-06-12 PROCEDURE — 80307 DRUG TEST PRSMV CHEM ANLYZR: CPT

## 2021-06-12 PROCEDURE — 84443 ASSAY THYROID STIM HORMONE: CPT

## 2021-06-12 PROCEDURE — 81003 URINALYSIS AUTO W/O SCOPE: CPT

## 2021-06-12 PROCEDURE — 36415 COLL VENOUS BLD VENIPUNCTURE: CPT

## 2021-06-12 PROCEDURE — 99284 EMERGENCY DEPT VISIT MOD MDM: CPT

## 2021-06-12 PROCEDURE — 80053 COMPREHEN METABOLIC PANEL: CPT

## 2021-06-14 ASSESSMENT — ENCOUNTER SYMPTOMS
SINUS PAIN: 0
EYE REDNESS: 0
COUGH: 0
TROUBLE SWALLOWING: 0
SORE THROAT: 0
ABDOMINAL PAIN: 0
SHORTNESS OF BREATH: 0
DIARRHEA: 0
RHINORRHEA: 0

## 2021-06-14 NOTE — ED PROVIDER NOTES
Dana Parish is a 26 y.o. male presenting to the ED for warts to LEFT UE and groin and lymphadenoapthy, beginning pta ago. The complaint has been persistent, moderate in severity, and worsened by nothing. Patient is 38-year-old male with history of IVDA. He states last time he used IV drugs was greater than 2 years ago. He states he has genital warts and has some in the suprapubic area and on his left anterior bicep. He states he has no testicular or penile warts at this time. He states he came in today for enlarged cervical lymph nodes. He denies any cough or cold symptoms any fever or chills. He states he recently got the Covid vaccine and noticed reduction in size of the lymph nodes. But they were present prior to vaccination. He denies any night sweats or fevers. Denies any cough or cold symptoms denies any shortness of breath chest pain abdominal pain neck or back pain or headaches. He denies any urinary symptoms or diarrhea. He has not seen a family doctor in some time. He states he has never been diagnosed with hepatitis or HIV. He denies any recent vectorborne insect bites. He denies any recent travel. He states that he smokes marijuana and crystal meth now but has not used any IV drugs for over 2 years. Review of Systems:   Review of Systems   Constitutional: Negative for diaphoresis, fatigue and fever. HENT: Negative for congestion, postnasal drip, rhinorrhea, sinus pain, sore throat, tinnitus and trouble swallowing. Eyes: Negative for redness and visual disturbance. Respiratory: Negative for cough and shortness of breath. Cardiovascular: Negative for chest pain, palpitations and leg swelling. Gastrointestinal: Negative for abdominal pain and diarrhea. Endocrine: Negative for polyphagia and polyuria. Genitourinary: Negative for difficulty urinating, frequency and genital sores. Musculoskeletal: Negative for arthralgias, myalgias and neck pain.    Skin: Negative for pallor and rash. Allergic/Immunologic: Negative for food allergies and immunocompromised state. Neurological: Negative for light-headedness and headaches. Hematological: Positive for adenopathy. Does not bruise/bleed easily. Psychiatric/Behavioral: Negative for agitation. The patient is not nervous/anxious.                  --------------------------------------------- PAST HISTORY ---------------------------------------------  Past Medical History:  has no past medical history on file. Past Surgical History:  has no past surgical history on file. Social History:  reports that he has been smoking cigarettes. He has never used smokeless tobacco. He reports current alcohol use. He reports that he does not use drugs. Family History: family history is not on file. The patients home medications have been reviewed. Allergies: Patient has no known allergies.     -------------------------------------------------- RESULTS -------------------------------------------------  All laboratory and radiology results have been personally reviewed by myself   LABS:  Results for orders placed or performed during the hospital encounter of 06/12/21   CBC Auto Differential   Result Value Ref Range    WBC 7.6 4.5 - 11.5 E9/L    RBC 4.95 3.80 - 5.80 E12/L    Hemoglobin 14.2 12.5 - 16.5 g/dL    Hematocrit 43.1 37.0 - 54.0 %    MCV 87.1 80.0 - 99.9 fL    MCH 28.7 26.0 - 35.0 pg    MCHC 32.9 32.0 - 34.5 %    RDW 13.2 11.5 - 15.0 fL    Platelets 158 247 - 971 E9/L    MPV 10.7 7.0 - 12.0 fL    Neutrophils % 67.9 43.0 - 80.0 %    Immature Granulocytes % 0.1 0.0 - 5.0 %    Lymphocytes % 19.9 (L) 20.0 - 42.0 %    Monocytes % 10.9 2.0 - 12.0 %    Eosinophils % 0.5 0.0 - 6.0 %    Basophils % 0.7 0.0 - 2.0 %    Neutrophils Absolute 5.19 1.80 - 7.30 E9/L    Immature Granulocytes # 0.01 E9/L    Lymphocytes Absolute 1.52 1.50 - 4.00 E9/L    Monocytes Absolute 0.83 0.10 - 0.95 E9/L    Eosinophils Absolute 0.04 (L) 0.05 - 0.50 E9/L    Basophils Absolute 0.05 0.00 - 0.20 E9/L   Comprehensive Metabolic Panel w/ Reflex to MG   Result Value Ref Range    Sodium 138 132 - 146 mmol/L    Potassium reflex Magnesium 3.9 3.5 - 5.0 mmol/L    Chloride 103 98 - 107 mmol/L    CO2 25 22 - 29 mmol/L    Anion Gap 10 7 - 16 mmol/L    Glucose 102 (H) 74 - 99 mg/dL    BUN 13 6 - 20 mg/dL    CREATININE 1.0 0.7 - 1.2 mg/dL    GFR Non-African American >60 >=60 mL/min/1.73    GFR African American >60     Calcium 9.4 8.6 - 10.2 mg/dL    Total Protein 7.2 6.4 - 8.3 g/dL    Albumin 4.6 3.5 - 5.2 g/dL    Total Bilirubin 0.5 0.0 - 1.2 mg/dL    Alkaline Phosphatase 72 40 - 129 U/L    ALT 15 0 - 40 U/L    AST 17 0 - 39 U/L   Mononucleosis screen   Result Value Ref Range    Mono Test Negative Negative   Urinalysis, reflex to microscopic   Result Value Ref Range    Color, UA Yellow Straw/Yellow    Clarity, UA Clear Clear    Glucose, Ur Negative Negative mg/dL    Bilirubin Urine SMALL (A) Negative    Ketones, Urine 15 (A) Negative mg/dL    Specific Gravity, UA >=1.030 1.005 - 1.030    Blood, Urine Negative Negative    pH, UA 6.0 5.0 - 9.0    Protein, UA Negative Negative mg/dL    Urobilinogen, Urine 0.2 <2.0 E.U./dL    Nitrite, Urine Negative Negative    Leukocyte Esterase, Urine Negative Negative   Urine Drug Screen   Result Value Ref Range    Amphetamine Screen, Urine POSITIVE (A) Negative <1000 ng/mL    Barbiturate Screen, Ur NOT DETECTED Negative < 200 ng/mL    Benzodiazepine Screen, Urine NOT DETECTED Negative < 200 ng/mL    Cannabinoid Scrn, Ur POSITIVE (A) Negative < 50ng/mL    Cocaine Metabolite Screen, Urine NOT DETECTED Negative < 300 ng/mL    Opiate Scrn, Ur NOT DETECTED Negative < 300ng/mL    PCP Screen, Urine NOT DETECTED Negative < 25 ng/mL    Methadone Screen, Urine NOT DETECTED Negative <300 ng/mL    Oxycodone Urine NOT DETECTED Negative <100 ng/mL    FENTANYL SCREEN, URINE NOT DETECTED Negative <1 ng/mL    Drug Screen Comment: see below    TSH without Reflex   Result Value Ref Range    TSH 1.590 0.270 - 4.200 uIU/mL       RADIOLOGY:  Interpreted by Radiologist.  No orders to display       ------------------------- NURSING NOTES AND VITALS REVIEWED ---------------------------   The nursing notes within the ED encounter and vital signs as below have been reviewed. /87   Pulse 70   Temp 98 °F (36.7 °C)   Resp 14   Ht 5' 7\" (1.702 m)   Wt 170 lb (77.1 kg)   SpO2 98%   BMI 26.63 kg/m²   Oxygen Saturation Interpretation: Normal      ---------------------------------------------------PHYSICAL EXAM--------------------------------------    Physical Exam  Vitals reviewed. Constitutional:       General: He is not in acute distress. Appearance: Normal appearance. He is not toxic-appearing. HENT:      Head: Normocephalic and atraumatic. Right Ear: External ear normal.      Left Ear: External ear normal.      Nose: Nose normal. No congestion. Mouth/Throat:      Mouth: Mucous membranes are moist.      Pharynx: Oropharynx is clear. No oropharyngeal exudate or posterior oropharyngeal erythema. Comments: No trismus no trouble swallowing  Eyes:      Extraocular Movements: Extraocular movements intact. Pupils: Pupils are equal, round, and reactive to light. Cardiovascular:      Rate and Rhythm: Normal rate and regular rhythm. Pulses: Normal pulses. Heart sounds: No murmur heard. Pulmonary:      Effort: Pulmonary effort is normal.      Breath sounds: No stridor. No wheezing, rhonchi or rales. Chest:      Chest wall: No tenderness. Abdominal:      General: Bowel sounds are normal.      Tenderness: There is no abdominal tenderness. There is no right CVA tenderness, left CVA tenderness or guarding. Musculoskeletal:         General: No swelling or deformity. Cervical back: Normal range of motion and neck supple. No muscular tenderness. Lymphadenopathy:      Cervical: Cervical adenopathy present.    Skin: General: Skin is warm and dry. Capillary Refill: Capillary refill takes less than 2 seconds. Findings: No bruising or erythema. Comments: 2-3 scattered warts uon suprapubic area and left biceps, no penile or testicualr warts when examined w chaperone   Neurological:      General: No focal deficit present. Mental Status: He is alert and oriented to person, place, and time. Sensory: No sensory deficit. Motor: No weakness. Coordination: Coordination normal.      Gait: Gait normal.   Psychiatric:         Mood and Affect: Mood normal.         Behavior: Behavior normal.               ------------------------------ ED COURSE/MEDICAL DECISION MAKING----------------------  Medications - No data to display    ED COURSE:       Medical Decision Making:    Patient found to have cervical lymphadenopathy. Otherwise he looks very well. With patient's medical history I recommended follow-up with heme-onc and infectious disease. He may need further testing including HIV and hepatitis testing. He also may need biopsy. He is to call tomorrow family doctor heme-onc and ID for close follow-up within the next 2 to 3 days. We discussed warning signs and symptoms for when to return. Motrin or Tylenol recommended for any pain associated with lymphadenopathy. Risks and benefits were discussed with patient for All medications dispensed and given in department as well prescriptions prescribed for home, . The patient elected to take the medicine. Pt instructed on warning signs and precautions for medication side effects. The patient was given warning signs for when to seek medical attention. Counseled regarding todays diagnosis, including possible risks and complications,  especially if left uncontrolled.      Counseled regarding the possible side effects, risks, benefits and alternatives to treatment; patient and/or guardian verbalizes understanding, agrees, feels comfortable with and wishes to proceed with treatment plan. Advised patient to call her primary care physician with any new medication issues, and read all Rx info from pharmacy to assure aware of all possible risks and side effects of medication before taking. I did discuss warning signs for when to return to the Emergency Room, and the patient verbalized understanding      Counseling: The emergency provider has spoken with the patient and discussed todays results, in addition to providing specific details for the plan of care and counseling regarding the diagnosis and prognosis. Questions are answered at this time and they are agreeable with the plan.      --------------------------------- IMPRESSION AND DISPOSITION ---------------------------------    IMPRESSION  1. Lymphadenopathy    2. Viral warts, unspecified type    3. History of drug use disorder        DISPOSITION  Disposition: Discharge to home  Patient condition is good      NOTE: This report was transcribed using voice recognition software.  Every effort was made to ensure accuracy; however, inadvertent computerized transcription errors may be present       Harper Penny DO  06/14/21 1521

## 2021-09-03 ENCOUNTER — HOSPITAL ENCOUNTER (INPATIENT)
Age: 31
LOS: 4 days | Discharge: HOME | DRG: 885 | End: 2021-09-09
Attending: EMERGENCY MEDICINE | Admitting: PSYCHIATRY & NEUROLOGY

## 2021-09-03 DIAGNOSIS — R45.851 SUICIDAL IDEATION: Primary | ICD-10-CM

## 2021-09-03 DIAGNOSIS — R59.0 CERVICAL LYMPHADENOPATHY: ICD-10-CM

## 2021-09-03 LAB
BASOPHILS ABSOLUTE: 0.06 E9/L (ref 0–0.2)
BASOPHILS RELATIVE PERCENT: 0.6 % (ref 0–2)
EOSINOPHILS ABSOLUTE: 0.35 E9/L (ref 0.05–0.5)
EOSINOPHILS RELATIVE PERCENT: 3.4 % (ref 0–6)
HCT VFR BLD CALC: 45.9 % (ref 37–54)
HEMOGLOBIN: 15.3 G/DL (ref 12.5–16.5)
IMMATURE GRANULOCYTES #: 0.03 E9/L
IMMATURE GRANULOCYTES %: 0.3 % (ref 0–5)
LYMPHOCYTES ABSOLUTE: 3.79 E9/L (ref 1.5–4)
LYMPHOCYTES RELATIVE PERCENT: 36.6 % (ref 20–42)
MCH RBC QN AUTO: 28.3 PG (ref 26–35)
MCHC RBC AUTO-ENTMCNC: 33.3 % (ref 32–34.5)
MCV RBC AUTO: 85 FL (ref 80–99.9)
MONOCYTES ABSOLUTE: 0.79 E9/L (ref 0.1–0.95)
MONOCYTES RELATIVE PERCENT: 7.6 % (ref 2–12)
NEUTROPHILS ABSOLUTE: 5.34 E9/L (ref 1.8–7.3)
NEUTROPHILS RELATIVE PERCENT: 51.5 % (ref 43–80)
PDW BLD-RTO: 13.4 FL (ref 11.5–15)
PLATELET # BLD: 263 E9/L (ref 130–450)
PMV BLD AUTO: 10.9 FL (ref 7–12)
RBC # BLD: 5.4 E12/L (ref 3.8–5.8)
WBC # BLD: 10.4 E9/L (ref 4.5–11.5)

## 2021-09-03 PROCEDURE — 80179 DRUG ASSAY SALICYLATE: CPT

## 2021-09-03 PROCEDURE — 80307 DRUG TEST PRSMV CHEM ANLYZR: CPT

## 2021-09-03 PROCEDURE — 82077 ASSAY SPEC XCP UR&BREATH IA: CPT

## 2021-09-03 PROCEDURE — 87636 SARSCOV2 & INF A&B AMP PRB: CPT

## 2021-09-03 PROCEDURE — 80143 DRUG ASSAY ACETAMINOPHEN: CPT

## 2021-09-03 PROCEDURE — 80053 COMPREHEN METABOLIC PANEL: CPT

## 2021-09-03 PROCEDURE — 81003 URINALYSIS AUTO W/O SCOPE: CPT

## 2021-09-03 PROCEDURE — 93005 ELECTROCARDIOGRAM TRACING: CPT | Performed by: NURSE PRACTITIONER

## 2021-09-03 PROCEDURE — 99284 EMERGENCY DEPT VISIT MOD MDM: CPT

## 2021-09-03 PROCEDURE — 85025 COMPLETE CBC W/AUTO DIFF WBC: CPT

## 2021-09-03 RX ORDER — ACETAMINOPHEN 500 MG
1000 TABLET ORAL EVERY 4 HOURS PRN
Status: ON HOLD | COMMUNITY
End: 2021-09-09 | Stop reason: HOSPADM

## 2021-09-03 NOTE — LETTER
[address] [place] [address] Way  Phone: [phone]          September 9, 2021     Patient: Terra Patel   YOB: 1990   Date of Visit: 9/3/2021       To Whom It May Concern:     Edmundo Singh was treated in the hospital from 9/3/2021 and was discharged on 9/9/2021. Sincerely,         SHELLY Hasitngs R.N., charge nurse

## 2021-09-04 LAB
ACETAMINOPHEN LEVEL: <5 MCG/ML (ref 10–30)
ALBUMIN SERPL-MCNC: 4.2 G/DL (ref 3.5–5.2)
ALP BLD-CCNC: 61 U/L (ref 40–129)
ALT SERPL-CCNC: 14 U/L (ref 0–40)
AMPHETAMINE SCREEN, URINE: NOT DETECTED
ANION GAP SERPL CALCULATED.3IONS-SCNC: 7 MMOL/L (ref 7–16)
AST SERPL-CCNC: 14 U/L (ref 0–39)
BARBITURATE SCREEN URINE: NOT DETECTED
BENZODIAZEPINE SCREEN, URINE: NOT DETECTED
BILIRUB SERPL-MCNC: <0.2 MG/DL (ref 0–1.2)
BILIRUBIN URINE: NEGATIVE
BLOOD, URINE: NEGATIVE
BUN BLDV-MCNC: 16 MG/DL (ref 6–20)
CALCIUM SERPL-MCNC: 9.3 MG/DL (ref 8.6–10.2)
CANNABINOID SCREEN URINE: POSITIVE
CHLORIDE BLD-SCNC: 105 MMOL/L (ref 98–107)
CLARITY: CLEAR
CO2: 27 MMOL/L (ref 22–29)
COCAINE METABOLITE SCREEN URINE: POSITIVE
COLOR: YELLOW
CREAT SERPL-MCNC: 1 MG/DL (ref 0.7–1.2)
EKG ATRIAL RATE: 52 BPM
EKG P AXIS: 66 DEGREES
EKG P-R INTERVAL: 148 MS
EKG Q-T INTERVAL: 388 MS
EKG QRS DURATION: 92 MS
EKG QTC CALCULATION (BAZETT): 360 MS
EKG R AXIS: 80 DEGREES
EKG T AXIS: 76 DEGREES
EKG VENTRICULAR RATE: 52 BPM
ETHANOL: <10 MG/DL (ref 0–0.08)
FENTANYL SCREEN, URINE: NOT DETECTED
GFR AFRICAN AMERICAN: >60
GFR NON-AFRICAN AMERICAN: >60 ML/MIN/1.73
GLUCOSE BLD-MCNC: 93 MG/DL (ref 74–99)
GLUCOSE URINE: NEGATIVE MG/DL
INFLUENZA A: NOT DETECTED
INFLUENZA B: NOT DETECTED
KETONES, URINE: NEGATIVE MG/DL
LEUKOCYTE ESTERASE, URINE: NEGATIVE
Lab: ABNORMAL
METHADONE SCREEN, URINE: NOT DETECTED
NITRITE, URINE: NEGATIVE
OPIATE SCREEN URINE: NOT DETECTED
OXYCODONE URINE: NOT DETECTED
PH UA: 5.5 (ref 5–9)
PHENCYCLIDINE SCREEN URINE: NOT DETECTED
POTASSIUM SERPL-SCNC: 4.1 MMOL/L (ref 3.5–5)
PROTEIN UA: NEGATIVE MG/DL
SALICYLATE, SERUM: <0.3 MG/DL (ref 0–30)
SARS-COV-2 RNA, RT PCR: NOT DETECTED
SODIUM BLD-SCNC: 139 MMOL/L (ref 132–146)
SPECIFIC GRAVITY UA: 1.02 (ref 1–1.03)
TOTAL CK: 108 U/L (ref 20–200)
TOTAL PROTEIN: 7.2 G/DL (ref 6.4–8.3)
TRICYCLIC ANTIDEPRESSANTS SCREEN SERUM: NEGATIVE NG/ML
UROBILINOGEN, URINE: 0.2 E.U./DL

## 2021-09-04 PROCEDURE — 82550 ASSAY OF CK (CPK): CPT

## 2021-09-04 PROCEDURE — 6370000000 HC RX 637 (ALT 250 FOR IP)

## 2021-09-04 RX ORDER — ACETAMINOPHEN 325 MG/1
TABLET ORAL
Status: COMPLETED
Start: 2021-09-04 | End: 2021-09-04

## 2021-09-04 RX ORDER — ACETAMINOPHEN 325 MG/1
650 TABLET ORAL ONCE
Status: COMPLETED | OUTPATIENT
Start: 2021-09-04 | End: 2021-09-04

## 2021-09-04 RX ADMIN — ACETAMINOPHEN 650 MG: 325 TABLET ORAL at 18:22

## 2021-09-04 ASSESSMENT — PAIN SCALES - GENERAL: PAINLEVEL_OUTOF10: 8

## 2021-09-04 NOTE — ED NOTES
Bed: [facility]  Expected date:   Expected time:   Means of arrival:   Comments:  Terminal clean     Sridevi Bhatt RN  09/04/21 [id number]

## 2021-09-04 NOTE — ED NOTES
Spoke with Jose D Kelly at [facility] in reference to the wait list. She reported that there are currently !2 others waiting at this time.      Nicanor Calvillo  09/04/21 1524

## 2021-09-04 NOTE — ED NOTES
Bed: 14A-14  Expected date:   Expected time:   Means of arrival:   Comments:  Triage psych     Anna Ray RN  09/03/21 [id number]

## 2021-09-04 NOTE — ED PROVIDER NOTES
HPI:  9/3/21,   Time: 11:31 PM EDT       Prince Tripathi is a 26 y.o. male presenting to the ED for psychiatric evaluation, beginning 1 day ago. The complaint has been persistent, moderate in severity, and worsened by nothing. The patient states that he has been having suicidal thoughts for several weeks. He states that at one point he did make a noose and put his neck in it but did not go through with it. He states he continues to have these thoughts therefore he came to the ED to be evaluated. Denies any HI or hallucinations. Does complain of lymphadenopathy in his neck for which she has follow-up on Wednesday for further work-up. Denies any chest pain shortness of breath. No numbness tingling. No abdominal pain. No nausea vomiting. Review of Systems:   Pertinent positives and negatives are stated within HPI, all other systems reviewed and are negative.          --------------------------------------------- PAST HISTORY ---------------------------------------------  Past Medical History:  has no past medical history on file. Past Surgical History:  has no past surgical history on file. Social History:  reports that he has been smoking cigarettes and e-cigarettes. He has been smoking about 1.00 pack per day. He has never used smokeless tobacco. He reports previous alcohol use. He reports current drug use. Drug: Marijuana. Family History: family history is not on file. The patients home medications have been reviewed. Allergies: Patient has no known allergies.         ---------------------------------------------------PHYSICAL EXAM--------------------------------------    Constitutional/General: Alert and oriented x3, well appearing, non toxic in NAD  Head: Normocephalic and atraumatic  Eyes: PERRL, EOMI, conjunctive normal, sclera non icteric  Mouth: Oropharynx clear, handling secretions, no trismus, no asymmetry of the posterior oropharynx or uvular edema  Neck: Supple, full ROM, non tender to palpation in the midline, no stridor, no crepitus, no meningeal signs  Respiratory: Lungs clear to auscultation bilaterally, no wheezes, rales, or rhonchi. Not in respiratory distress  Cardiovascular:  Regular rate. Regular rhythm. No murmurs, gallops, or rubs. 2+ distal pulses  Chest: No chest wall tenderness  GI:  Abdomen Soft, Non tender, Non distended. +BS. No organomegaly, no palpable masses,  No rebound, guarding, or rigidity. Musculoskeletal: Moves all extremities x 4. Warm and well perfused, no clubbing, cyanosis, or edema. Capillary refill <3 seconds  Integument: skin warm and dry. No rashes. Lymphatic: Cervical lymphadenopathy noted on the right  Neurologic: GCS 15, no focal deficits, symmetric strength 5/5 in the upper and lower extremities bilaterally  Psychiatric: Depressed affect, poor judgment    -------------------------------------------------- RESULTS -------------------------------------------------  I have personally reviewed all laboratory and imaging results for this patient. Results are listed below.      LABS:  Results for orders placed or performed during the hospital encounter of 09/03/21   COVID-19 & Influenza Combo    Specimen: Nasopharyngeal Swab   Result Value Ref Range    SARS-CoV-2 RNA, RT PCR NOT DETECTED NOT DETECTED    INFLUENZA A NOT DETECTED NOT DETECTED    INFLUENZA B NOT DETECTED NOT DETECTED   Comprehensive Metabolic Panel   Result Value Ref Range    Sodium 139 132 - 146 mmol/L    Potassium 4.1 3.5 - 5.0 mmol/L    Chloride 105 98 - 107 mmol/L    CO2 27 22 - 29 mmol/L    Anion Gap 7 7 - 16 mmol/L    Glucose 93 74 - 99 mg/dL    BUN 16 6 - 20 mg/dL    CREATININE 1.0 0.7 - 1.2 mg/dL    GFR Non-African American >60 >=60 mL/min/1.73    GFR African American >60     Calcium 9.3 8.6 - 10.2 mg/dL    Total Protein 7.2 6.4 - 8.3 g/dL    Albumin 4.2 3.5 - 5.2 g/dL    Total Bilirubin <0.2 0.0 - 1.2 mg/dL    Alkaline Phosphatase 61 40 - 129 U/L    ALT 14 0 - 40 U/L    AST 14 0 DETECTED Negative < 25 ng/mL    Methadone Screen, Urine NOT DETECTED Negative <300 ng/mL    Oxycodone Urine NOT DETECTED Negative <100 ng/mL    FENTANYL SCREEN, URINE NOT DETECTED Negative <1 ng/mL    Drug Screen Comment: see below    CK   Result Value Ref Range    Total  20 - 200 U/L       RADIOLOGY:  Interpreted by Radiologist.  No orders to display       EKG: This EKG is signed and interpreted by the EP. EKG shows sinus bradycardia 52 bpm.  Normal axis. Normal QRS. No STEMI.      ------------------------- NURSING NOTES AND VITALS REVIEWED ---------------------------   The nursing notes within the ED encounter and vital signs as below have been reviewed by myself. /65   Pulse 70   Temp 98.6 °F (37 °C) (Oral)   Resp 20   SpO2 98%   Oxygen Saturation Interpretation: Normal    The patients available past medical records and past encounters were reviewed. ------------------------------ ED COURSE/MEDICAL DECISION MAKING----------------------  Medications - No data to display      ED COURSE:       Medical Decision Making: This is a 38-year-old male presents to the ED for psychiatric evaluation. Patient underwent laboratory work-up which showed a normal CBC. Normal chemistry. Negative Covid test.  Serum drug screen negative. Urinalysis negative. Patient has follow-up closely for his lymphadenopathy. Patient medically cleared at this time.  consulted. Disposition pending  evaluation. I, Dr. Cesario Olson, am the primary provider for this encounter    This patient's ED course included: a personal history and physicial examination and re-evaluation prior to disposition    This patient has remained hemodynamically stable during their ED course. Re-Evaluations:             Re-evaluation. Patients symptoms show no change    Counseling:    The emergency provider has spoken with the patient and discussed todays results, in addition to providing specific details for the plan of care and counseling regarding the diagnosis and prognosis. Questions are answered at this time and they are agreeable with the plan.       --------------------------------- IMPRESSION AND DISPOSITION ---------------------------------    IMPRESSION  1. Suicidal ideation    2. Cervical lymphadenopathy        DISPOSITION  Disposition: Per   Patient condition is stable    NOTE: This report was transcribed using voice recognition software.  Every effort was made to ensure accuracy; however, inadvertent computerized transcription errors may be present        Sai Alvarez, DO  09/04/21 [address] [facility], DO  09/04/21 1373

## 2021-09-04 NOTE — ED NOTES
FIRST PROVIDER CONTACT ASSESSMENT NOTE      Department of Emergency Medicine   9/3/21  11:07 PM EDT    Chief Complaint: Psychiatric Evaluation (pt suicide attempts in past, was on meds and stopped taking x1 year ago. started having suicidal thoughts so he wanted to come be seen) and Suicidal      History of Present Illness:   Ron Knutson is a 26 y.o. male who presents to the ED for    Medical History:  has no past medical history on file. Surgical History:  has no past surgical history on file. Social History:  reports that he has been smoking cigarettes. He has never used smokeless tobacco. He reports current alcohol use. He reports that he does not use drugs. Family History: family history is not on file. *ALLERGIES*     Patient has no known allergies.      Physical Exam:      VS:  Pulse 70   SpO2 97%      Initial Plan of Care:  Initiate Treatment-Testing, Proceed toTreatment Area When Bed Available for ED Attending/MLP to Continue Care    -----------------END OF FIRST PROVIDER CONTACT ASSESSMENT NOTE--------------  Electronically signed by RASHEEDA Alexandra CNP   DD: 9/3/21             RASHEEDA Zaragoza CNP  09/03/21 0064

## 2021-09-04 NOTE — ED NOTES
Emergency Department [facility] AN AFFILIATE OF [facility] Emergency Biopsychosocial Assessment Note    Chief Complaint:   Pt presented into the ED for pt suicide attempts in past, was on meds and stopped taking x1 year ago. started having suicidal thoughts so he wanted to come be seen    MSE:  Pt was alert, oriented x4, mood stable, affect broad, appropriate, speech normal in rate, flow and volume, behavior is cooperative, no signs of agitation, appears in behavioral control, thought form logical, organized, thought content clear, good hygiene, normal eye contact. Pt denies to having any auditory/visual hallucinations. Clinical Summary/History:   Pt is a 27 yo male who presented into the ED for wanting to get back on psych medication due to having suicidal ideation. Pt explained that he now has a supportive fiance and 11 month old baby girl and wants to feel better and be happy. Pt reports to having an increase in anxiety and having ups and downs to the point that some days he doesn't shower and do the basic things he needs to do. Pt admits to SI with no plan. Pt does report to a hx of one attempt little over a year ago by cutting his left hand with a knife and hitting an artery/nerve. Pt had to be [facility] from [facility] ED to [facility]. Pt has a hx of MH but is unaware of his diagnoses. Pt currently does not treat with any outpatient services nor on any medication. Pt has a hx of treating with [facility] and reported it really helped him. Pt last inpatient [facility] hospitalizations was on 7/26/2019 at [facility]. 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 Pt reports without insurance it has been difficult to get the help/treatment he needs. Pt reports to having an upcoming appointment to further discuss health concerns with a physician. Pt admits to having a hx of drug/alochol abuse. Pt reports to being 2 years clean from drugs other than smoking Marijuana daily - unknown amount.  Pt also reports to being clean 2 months from alcohol. Pt denies to having a guardian/POA, legal issues. Pt reports to a hx of aggression against himself. Pt reports to living in a house with his fiance and daughter and being employed at [employer]. Pt reported to not having any health insurance at this time. Gender  [x] Male [] Female [] Transgender  [] Other    Sexual Orientation    [x] Heterosexual [] Homosexual [] Bisexual [] Other    Suicidal Behavioral: CSSR-S Complete. [x] Reports:    [] Past [x] Present   [] Denies    Homicidal/ Violent Behavior  [] Reports:   [] Past [] Present   [x] Denies     Hallucinations/Delusions   [] Reports:   [x] Denies     Substance Use/Alcohol Use/Addiction: SBIRT Screen Complete. [x] Reports: Marijuana  [] Denies     Trauma History  [] Reports:  [x] Denies     Collateral Information:   SW was given verbal consent to contact Pt carlo [place] at [phone] to collect more collateral information. SW was informed that Pt has been very depressed and \"crying and sleeping a lot\". Pt has not made any recent suicidal comments. Level of Care/Disposition Plan  [] Home:   [] Outpatient Provider:   [] Crisis Unit:   [x] Inpatient Psychiatric Unit:  [] Other:     Pt has been [place] [facility]lipped by ED physician. Once medically cleared, SW will proceed with inpatient admission.        DEDE Pate, [place]  09/04/21 3675

## 2021-09-05 PROBLEM — R45.851 SUICIDAL IDEATIONS: Status: ACTIVE | Noted: 2021-09-05

## 2021-09-05 PROCEDURE — 6370000000 HC RX 637 (ALT 250 FOR IP): Performed by: NURSE PRACTITIONER

## 2021-09-05 PROCEDURE — 6370000000 HC RX 637 (ALT 250 FOR IP)

## 2021-09-05 PROCEDURE — 1240000000 HC EMOTIONAL WELLNESS R&B

## 2021-09-05 RX ORDER — ACETAMINOPHEN 325 MG/1
650 TABLET ORAL EVERY 6 HOURS PRN
Status: DISCONTINUED | OUTPATIENT
Start: 2021-09-05 | End: 2021-09-09 | Stop reason: HOSPADM

## 2021-09-05 RX ORDER — HALOPERIDOL 5 MG/ML
5 INJECTION INTRAMUSCULAR EVERY 6 HOURS PRN
Status: DISCONTINUED | OUTPATIENT
Start: 2021-09-05 | End: 2021-09-09 | Stop reason: HOSPADM

## 2021-09-05 RX ORDER — TRAZODONE HYDROCHLORIDE 50 MG/1
50 TABLET ORAL NIGHTLY PRN
Status: DISCONTINUED | OUTPATIENT
Start: 2021-09-05 | End: 2021-09-09 | Stop reason: HOSPADM

## 2021-09-05 RX ORDER — ACETAMINOPHEN 325 MG/1
650 TABLET ORAL ONCE
Status: COMPLETED | OUTPATIENT
Start: 2021-09-05 | End: 2021-09-05

## 2021-09-05 RX ORDER — NICOTINE 21 MG/24HR
1 PATCH, TRANSDERMAL 24 HOURS TRANSDERMAL DAILY
Status: DISCONTINUED | OUTPATIENT
Start: 2021-09-05 | End: 2021-09-09 | Stop reason: HOSPADM

## 2021-09-05 RX ORDER — HYDROXYZINE PAMOATE 50 MG/1
50 CAPSULE ORAL 3 TIMES DAILY PRN
Status: DISCONTINUED | OUTPATIENT
Start: 2021-09-05 | End: 2021-09-09 | Stop reason: HOSPADM

## 2021-09-05 RX ORDER — HALOPERIDOL 5 MG
5 TABLET ORAL EVERY 6 HOURS PRN
Status: DISCONTINUED | OUTPATIENT
Start: 2021-09-05 | End: 2021-09-09 | Stop reason: HOSPADM

## 2021-09-05 RX ORDER — ACETAMINOPHEN 325 MG/1
TABLET ORAL
Status: COMPLETED
Start: 2021-09-05 | End: 2021-09-05

## 2021-09-05 RX ORDER — MAGNESIUM HYDROXIDE/ALUMINUM HYDROXICE/SIMETHICONE 120; 1200; 1200 MG/30ML; MG/30ML; MG/30ML
30 SUSPENSION ORAL PRN
Status: DISCONTINUED | OUTPATIENT
Start: 2021-09-05 | End: 2021-09-09 | Stop reason: HOSPADM

## 2021-09-05 RX ADMIN — ACETAMINOPHEN 650 MG: 325 TABLET ORAL at 21:11

## 2021-09-05 RX ADMIN — ACETAMINOPHEN 650 MG: 325 TABLET ORAL at 07:29

## 2021-09-05 RX ADMIN — TRAZODONE HYDROCHLORIDE 50 MG: 50 TABLET ORAL at 21:01

## 2021-09-05 ASSESSMENT — SLEEP AND FATIGUE QUESTIONNAIRES
DO YOU HAVE DIFFICULTY SLEEPING: YES
SLEEP PATTERN: INSOMNIA;DISTURBED/INTERRUPTED SLEEP;DIFFICULTY FALLING ASLEEP
DIFFICULTY ARISING: NO
DIFFICULTY FALLING ASLEEP: YES
RESTFUL SLEEP: NO
DIFFICULTY STAYING ASLEEP: YES
DO YOU USE A SLEEP AID: COMMENT
AVERAGE NUMBER OF SLEEP HOURS: 5

## 2021-09-05 ASSESSMENT — PAIN DESCRIPTION - PAIN TYPE: TYPE: CHRONIC PAIN

## 2021-09-05 ASSESSMENT — PAIN SCALES - GENERAL
PAINLEVEL_OUTOF10: 5
PAINLEVEL_OUTOF10: 7
PAINLEVEL_OUTOF10: 5

## 2021-09-05 ASSESSMENT — LIFESTYLE VARIABLES: HISTORY_ALCOHOL_USE: YES

## 2021-09-05 ASSESSMENT — PAIN DESCRIPTION - LOCATION: LOCATION: NECK

## 2021-09-05 ASSESSMENT — PATIENT HEALTH QUESTIONNAIRE - PHQ9: SUM OF ALL RESPONSES TO PHQ QUESTIONS 1-9: 22

## 2021-09-05 ASSESSMENT — PAIN DESCRIPTION - ORIENTATION: ORIENTATION: RIGHT

## 2021-09-06 PROBLEM — F19.10 POLYSUBSTANCE ABUSE (HCC): Status: ACTIVE | Noted: 2021-09-06

## 2021-09-06 PROBLEM — F31.62 BIPOLAR 1 DISORDER, MIXED, MODERATE (HCC): Status: ACTIVE | Noted: 2021-09-06

## 2021-09-06 PROCEDURE — 99222 1ST HOSP IP/OBS MODERATE 55: CPT | Performed by: NURSE PRACTITIONER

## 2021-09-06 PROCEDURE — 1240000000 HC EMOTIONAL WELLNESS R&B

## 2021-09-06 PROCEDURE — 6370000000 HC RX 637 (ALT 250 FOR IP): Performed by: NURSE PRACTITIONER

## 2021-09-06 RX ORDER — OLANZAPINE 5 MG/1
5 TABLET ORAL NIGHTLY
Status: DISCONTINUED | OUTPATIENT
Start: 2021-09-06 | End: 2021-09-09 | Stop reason: HOSPADM

## 2021-09-06 RX ORDER — OXCARBAZEPINE 300 MG/1
300 TABLET, FILM COATED ORAL 2 TIMES DAILY
Status: DISCONTINUED | OUTPATIENT
Start: 2021-09-06 | End: 2021-09-09 | Stop reason: HOSPADM

## 2021-09-06 RX ADMIN — OXCARBAZEPINE 300 MG: 300 TABLET, FILM COATED ORAL at 21:10

## 2021-09-06 RX ADMIN — OXCARBAZEPINE 300 MG: 300 TABLET, FILM COATED ORAL at 12:04

## 2021-09-06 RX ADMIN — TRAZODONE HYDROCHLORIDE 50 MG: 50 TABLET ORAL at 21:10

## 2021-09-06 RX ADMIN — ACETAMINOPHEN 650 MG: 325 TABLET ORAL at 16:28

## 2021-09-06 RX ADMIN — OLANZAPINE 5 MG: 5 TABLET, FILM COATED ORAL at 21:10

## 2021-09-06 RX ADMIN — ACETAMINOPHEN 650 MG: 325 TABLET ORAL at 09:02

## 2021-09-06 ASSESSMENT — PAIN SCALES - GENERAL
PAINLEVEL_OUTOF10: 0
PAINLEVEL_OUTOF10: 4
PAINLEVEL_OUTOF10: 4

## 2021-09-06 ASSESSMENT — SLEEP AND FATIGUE QUESTIONNAIRES
DO YOU HAVE DIFFICULTY SLEEPING: YES
DIFFICULTY ARISING: NO
DO YOU USE A SLEEP AID: YES
DIFFICULTY STAYING ASLEEP: YES
SLEEP PATTERN: DIFFICULTY FALLING ASLEEP;DISTURBED/INTERRUPTED SLEEP;INSOMNIA
DIFFICULTY FALLING ASLEEP: YES
RESTFUL SLEEP: NO
AVERAGE NUMBER OF SLEEP HOURS: 5

## 2021-09-06 ASSESSMENT — PATIENT HEALTH QUESTIONNAIRE - PHQ9: SUM OF ALL RESPONSES TO PHQ QUESTIONS 1-9: 19

## 2021-09-06 ASSESSMENT — LIFESTYLE VARIABLES: HISTORY_ALCOHOL_USE: YES

## 2021-09-06 NOTE — PROGRESS NOTES
[facility]  Admission Note     Admission Type:   Admission Type: Involuntary    Reason for admission:  Reason for Admission: \"suicidal some times, have death wish, feeling overwhelmed with children and work, new home, new baby, and has a growth onright side of neck that needs biopied on WED. \"    PATIENT STRENGTHS:  Strengths: Positive Support, Employment, Motivated    Patient Strengths and Limitations:  Limitations: General negative or hopeless attitude about future/recovery, Limited education -> difficulty reading or writing    Addictive Behavior:   Addictive Behavior  In the past 3 months, have you felt or has someone told you that you have a problem with:  : Sex/Pornography  Do you have a history of Chemical Use?: No  Do you have a history of Alcohol Use?: Yes  Do you have a history of Street Drug Abuse?: Yes  Histroy of Prescripton Drug Abuse?: No    Medical Problems:   History reviewed. No pertinent past medical history.     Status EXAM:  Status and Exam  Normal: No  Facial Expression: Flat, Sad  Affect: Congruent  Level of Consciousness: Alert  Mood:Normal: No  Mood: Depressed, Sad, Guilty  Motor Activity:Normal: No  Motor Activity: Decreased  Interview Behavior: Cooperative  Preception: Mills to Person, Particia Moh to Time, Mills to Place, Mills to Situation  Attention:Normal: Yes  Thought Content:Normal: No  Thought Content: Other(See Comment) (sad and feeling overwhelmed)  Hallucinations: None  Delusions: No  Memory:Normal: Yes  Insight and Judgment: Yes  Present Suicidal Ideation: Yes  Present Homicidal Ideation: No    Tobacco Screening:  Practical Counseling, on admission, nadege X, if applicable and completed (first 3 are required if patient doesn't refuse):            (x  Recognizing danger situations (included triggers and roadblocks)                    (x Coping skills (new ways to manage stress, exercise, relaxation techniques, changing routine, distraction) (x  Basic information about quitting (benefits of quitting, techniques in how to quit, available resources  (x ) Referral for counseling faxed to Jennifer                                           ( ) Patient refused counseling  ( ) Patient has not smoked in the last 30 days    Metabolic Screening:    Lab Results   Component Value Date    LABA1C 5.1 07/28/2019       Lab Results   Component Value Date    CHOL 186 07/28/2019    CHOL 151 05/23/2019     Lab Results   Component Value Date    TRIG 98 07/28/2019    TRIG 152 (H) 05/23/2019     Lab Results   Component Value Date    HDL 65 07/28/2019    HDL 61 05/23/2019     No components found for: LDLCAL  Lab Results   Component Value Date    LABVLDL 20 07/28/2019    LABVLDL 30 05/23/2019         Body mass index is 25.11 kg/m². BP Readings from Last 2 Encounters:   09/05/21 (!) 141/87   06/12/21 136/87           Pt admitted with followings belongings:        Patient's home medications were not brought. Patient oriented to surroundings and program expectations and copy of patient rights given. Received admission packet: and PIN. Consents reviewed and signed except for the Voluntary. Patient verbalize understanding: of policies. Patient education on precautions: every 15 min observations for suicide . contracts for safety now.                   Buddy Barraza RN

## 2021-09-06 NOTE — H&P
Department of Psychiatry  History and Physical - Adult     CHIEF COMPLAINT:  \" I just need to be back on my Remeron. \"    Patient was seen after discussing with the treatment team and reviewing the chart    CIRCUMSTANCES OF ADMISSION: presented to the ED for suicidal ideations. He reports increase in anxiety and having \"ups and downs and states that some days he does not shower or do the basic things he needs to take care of himself. HISTORY OF PRESENT ILLNESS:      The patient is a 26 y.o. male with significant past history of polysubstance abuse and depression presented to the ED for suicidal ideations. He reports increase in anxiety and having \"ups and downs and states that some days he does not shower or do the basic things he needs to take care of himself. In the ED his urine drug screen is positive for cannabis and cocaine he was medically cleared admitted to [address] [place] [address] adult psychiatric unit for further psychiatric assessment stabilization and treatment. Upon evaluation today patient states that he lied and said he was suicidal because he wanted to get back on his Remeron that he states helped him in the past.  He states that he has increase in depression due to all the stressors he has in his life. He states his main stressor is financial.  He states he has 5 children 5 different women he has to pay a lot of money and child support which has led him to lose his license. He states that now he has to ride his bike to work a couple miles every day back-and-forth and this is causing an increase in stress. He admits to having frequent mood swings and states that he will have a high. The last sometimes up to 2 weeks before he goes back down into a deep depression. He has a history of impulsivity as evidenced by 5 children with 5 different women. He denies any history of any auditory or visual hallucinations or any psychosis.     Past psychiatric history: Patient states this is his third inpatient psychiatric hospitalization. He states he is been on Remeron and Lexapro in the past.  He states he had followed up with [facility] in the past but is not currently active with any treatment. He admits to 2 suicide attempts the last 1/2019 by cutting his hand. He denies any when the family committed suicide he denies any when the family is any major mental illness    Legal history: Patient states that he was in long-term one time for shooting a gun    Substance use history: Patient states he has a history of polysubstance abuse has used cannabis, pills, methamphetamines, alcohol and heroin. He states currently he only uses cannabis however his urine drug screen is positive for cannabis and cocaine    Personal family and social history: [place] was raised by his mom and dad. He states he dropped out his thea year of high school. He states he is never been  but is currently engaged to his fiancée. He has 5 kids with 5 different women. He currently works at [employer]. He denies access to guns denies any history of physical sexual emotional abuse while growing up      Past Medical History:    History reviewed. No pertinent past medical history. Medications Prior to Admission:   Medications Prior to Admission: acetaminophen (TYLENOL) 500 MG tablet, Take 1,000 mg by mouth every 4 hours as needed for Pain    Past Surgical History:        Procedure Laterality Date    FRACTURE SURGERY      left arm age 7        Allergies:   Patient has no known allergies. Family History  Family History   Problem Relation Age of Onset    Other Father              EXAMINATION:    REVIEW OF SYSTEMS:    ROS:  [x] All negative/unchanged except if checked.  Explain positive(checked items) below:  [] Constitutional  [] Eyes  [] Ear/Nose/Mouth/Throat  [] Respiratory  [] CV  [] GI  []   [] Musculoskeletal  [] Skin/Breast  [] Neurological  [] Endocrine  [] Heme/Lymph  [] Allergic/Immunologic    Explanation:     Vitals:  BP (!) 152/84   Pulse 50   Temp 97 °F (36.1 °C)   Resp 14   Ht 5' 10\" (1.778 m)   Wt 175 lb (79.4 kg)   SpO2 99%   BMI 25.11 kg/m²      Physical Examination:   Head: x  Atraumatic: x normocephalic  Skin and Mucosa        Moist x  Dry   Pale  x Normal   Neck:  Thyroid  Palpable   x  Not palpable   venus distention   adenopathy   Chest: x Clear   Rhonchi     Wheezing   CV:  xS1   xS2    xNo murmer   Abdomen:  x  Soft    Tender    Viceromegaly   Extremities:  x No Edema     Edema     Cranial Nerves Examination:   CN II:   xPupils are reactive to light  Pupils are non reactive to light  CN III, IV, VI:  xNo eye deviation    No diplopia or ptosis   CN V:    xFacial Sensation is intact     Facial Sensation is not intact   CN IIIV:   x Hearing is normal to rubbing fingers   CN IX, X:     xNormal gag reflex and phonation   CN XI:   xShoulder shrug and neck rotation is normal  CNXII:    xTongue is midline no deviation or atrophy    Mental Status Examination:    Level of consciousness:  within normal limits   Appearance:  well-appearing  Behavior/Motor:  no abnormalities noted  Attitude toward examiner:  cooperative  Speech: Normal rate rhythm and tone  Mood: \" I feel okay. \"  Affect: Mood congruent appropriate little hyper  Thought processes: Linear without flight of ideas loose associations  Thought content: Devoid of any auditory visualizations delusions or any other perceptual abnormalities.   Denies SI/HI intent or plan  Cognition:  oriented to person, place, and time   Concentration intact  Memory intact  Insight fair   Judgement fair   Fund of Knowledge adequate      DIAGNOSIS:  Bipolar 1 mixed moderate  Polysubstance abuse          LABS: REVIEWED TODAY:  Recent Labs     09/03/21  2315   WBC 10.4   HGB 15.3        Recent Labs     09/03/21  2315      K 4.1      CO2 27   BUN 16   CREATININE 1.0   GLUCOSE 93     Recent Labs     09/03/21  2315 BILITOT <0.2   ALKPHOS 61   AST 14   ALT 14     Lab Results   Component Value Date    LABAMPH NOT DETECTED 09/03/2021    BARBSCNU NOT DETECTED 09/03/2021    LABBENZ NOT DETECTED 09/03/2021    LABMETH NOT DETECTED 09/03/2021    OPIATESCREENURINE NOT DETECTED 09/03/2021    PHENCYCLIDINESCREENURINE NOT DETECTED 09/03/2021    ETOH <10 09/03/2021     Lab Results   Component Value Date    TSH 1.590 06/12/2021     No results found for: LITHIUM  No results found for: VALPROATE, CBMZ  No results found for: LITHIUM, VALPROATE      Radiology No results found. TREATMENT PLAN:    Risk Management: Based on the diagnosis and assessment biopsychosocial treatment model was presented to the patient and was given the opportunity to ask any question. The patient was agreeable to the plan and all the patient's questions were answered to the patient's satisfaction. I discussed with the patient the risk, benefit, alternative and common side effects for the proposed medication treatment. The patient is consenting to this treatment. Collateral Information:  Will obtain collateral information from the family or friends. Will obtain medical records as appropriate from out patient providers  Will consult the hospitalist for a physical exam to rule out any co-morbid physical condition. Prn Haldol 5mg and Vistaril 50mg q6hr for extreme agitation. Trazodone as ordered for insomnia  Vistaril as ordered for anxiety  Patient's diagnosis, treatment plan, medication management was formulated at the end of evaluation and after reviewing relevant documentation.  Patient was seen directly by myself and Dr. Angelita Garcia    We will start Trileptal 300 mg twice daily for mood stabilization  Zyprexa 5 mg at bedtime for mood and sleep    Psychotherapy:   Encourage participation in milieu and group therapy  Individual therapy as needed              Behavioral Services  Medicare Certification Upon Admission    I certify that this patient's inpatient psychiatric hospital admission is medically necessary for:    [x] (1) Treatment which could reasonably be expected to improve this patient's condition,       [] (2) Or for diagnostic study;     AND     [x](2) The inpatient psychiatric services are provided while the individual is under the care of a physician and are included in the individualized plan of care.     Estimated length of stay/service 3-7 days based on stability    Plan for post-hospital care out patient psychiatric and counseling services    Electronically signed by Verner Blumenthal, APRN - CNP on 9/5/1443 at 11:25 AM      Electronically signed by Verner Blumenthal, APRN - CNP on 0/1/0808 at 9:08 AM

## 2021-09-06 NOTE — GROUP NOTE
Recreation assessment completed. Group Therapy Note    Date: 9/6/2021    Group Start Time: 1000  Group End Time: 7897  Group Topic: Psychoeducation    [facility]  1    Oren Osier, [address]         Date: 9/6/2021     Group Start Time: 1000  Group End Time: 7703  Group Topic: Psychoeducation     [facility] 1    Oren Osbrandi, [address]                                                                         Group Therapy Note     Date: 9/6/2021     Type of Group: Psychoeducation     Wellness Binder Information     Patient's Goal: patient will be able to id what coping skills he/she uses to manage stressors.      Notes: pleasant and sharing thru-out group.      Status After Intervention:  Improved     Participation Level:  Active Listener and Interactive     Participation Quality: Appropriate, Attentive, Sharing, and Supportive        Speech:  normal         Thought Process/Content: Logical        Affective Functioning: Congruent        Mood: euthymic        Level of consciousness:  Alert, Oriented x4, and Attentive        Response to Learning: Able to verbalize/acknowledge new learning, Able to retain information, and Progressing to goal        Endings: None Reported     Modes of Intervention: Education, Support, Socialization, Exploration, and Problem-solving        Discipline Responsible: Psychoeducational Specialist        Signature:  Emanuel Romero

## 2021-09-06 NOTE — GROUP NOTE
Group Therapy Note    Date: 9/6/2021    Group Start Time: 1050  Group End Time: 1130  Group Topic: Cognitive Skills    [facility]  Av[address] DEDE Davila, TRINIW        Group Therapy Note    Attendees: 14         Patient's Goal:  Pt will be able to identify positivity within their life. Notes:  Pt participated in group and made connections. Status After Intervention:  Unchanged    Participation Level:  Active Listener    Participation Quality: Appropriate and Attentive      Speech:  normal      Thought Process/Content: Logical      Affective Functioning: Congruent      Mood: depressed      Level of consciousness:  Alert and Oriented x4      Response to Learning: Able to verbalize current knowledge/experience and Able to retain information      Endings: None Reported    Modes of Intervention: Education, Support, Socialization, Exploration and Clarifying      Discipline Responsible: /Counselor      Signature:  DEDE Lo, [place]

## 2021-09-06 NOTE — PLAN OF CARE
Out in the lounge, social and interacting with peers. Taking po foods and fluids well. Reports he is sleeping too much. Denies suicidal thoughts or intent to harm himself or others. No voiced delusions. Denies hallucinations.

## 2021-09-06 NOTE — PLAN OF CARE
Problem: Pain:  Goal: Pain level will decrease  Description: Pain level will decrease  Outcome: Ongoing     Problem: Pain:  Goal: Control of acute pain  Description: Control of acute pain  Outcome: Ongoing     Problem: Pain:  Goal: Control of chronic pain  Description: Control of chronic pain  Outcome: Ongoing     Problem: Depressive Behavior With or Without Suicide Precautions:  Goal: Able to verbalize acceptance of life and situations over which he or she has no control  Description: Able to verbalize acceptance of life and situations over which he or she has no control  Outcome: Not Met This Shift     Problem: Depressive Behavior With or Without Suicide Precautions:  Goal: Able to verbalize and/or display a decrease in depressive symptoms  Description: Able to verbalize and/or display a decrease in depressive symptoms  Outcome: Not Met This Shift     Problem: Depressive Behavior With or Without Suicide Precautions:  Goal: Ability to disclose and discuss suicidal ideas will improve  Description: Ability to disclose and discuss suicidal ideas will improve  Outcome: Ongoing     Problem: Depressive Behavior With or Without Suicide Precautions:  Goal: Able to verbalize support systems  Description: Able to verbalize support systems  Outcome: Met This Shift     Problem: Depressive Behavior With or Without Suicide Precautions:  Goal: Absence of self-harm  Description: Absence of self-harm  Outcome: Ongoing     Problem: Depressive Behavior With or Without Suicide Precautions:  Goal: Patient specific goal  Description: Patient specific goal  Outcome: Not Met This Shift     Problem: Depressive Behavior With or Without Suicide Precautions:  Goal: Participates in care planning  Description: Participates in care planning  Outcome: Met This Shift

## 2021-09-06 NOTE — PLAN OF CARE
Patient reports he voluntarily came in and he only said he was suicidal to get on Remeron faster. After being downstairs for several days, he feels guilty now for taking a bed away from someone who needs it more than he does and he would like to discuss discharge with the treatment team. He realizes he should have made an appointment with [facility] for a prescription. Patient reports he has been sober from drugs for 2 years and from alcohol for 2 months. He acknowledges this may be contributing to his increased depression. Educated patient on pink slip. Encouraged groups. He denies SI, HI or hallucinations.      Problem: Depressive Behavior With or Without Suicide Precautions:  Goal: Ability to disclose and discuss suicidal ideas will improve  Description: Ability to disclose and discuss suicidal ideas will improve  9/6/2021 0840 by Karol Wright RN  Outcome: Met This Shift     Problem: Depressive Behavior With or Without Suicide Precautions:  Goal: Able to verbalize support systems  Description: Able to verbalize support systems  9/6/2021 0840 by Karol Wright RN  Outcome: Met This Shift     Problem: Depressive Behavior With or Without Suicide Precautions:  Goal: Absence of self-harm  Description: Absence of self-harm  9/6/2021 0840 by Karol Wright RN  Outcome: Met This Shift

## 2021-09-07 PROCEDURE — 99232 SBSQ HOSP IP/OBS MODERATE 35: CPT | Performed by: NURSE PRACTITIONER

## 2021-09-07 PROCEDURE — 6370000000 HC RX 637 (ALT 250 FOR IP): Performed by: NURSE PRACTITIONER

## 2021-09-07 PROCEDURE — 1240000000 HC EMOTIONAL WELLNESS R&B

## 2021-09-07 RX ADMIN — ACETAMINOPHEN 650 MG: 325 TABLET ORAL at 10:40

## 2021-09-07 RX ADMIN — OXCARBAZEPINE 300 MG: 300 TABLET, FILM COATED ORAL at 20:54

## 2021-09-07 RX ADMIN — OLANZAPINE 5 MG: 5 TABLET, FILM COATED ORAL at 20:54

## 2021-09-07 RX ADMIN — OXCARBAZEPINE 300 MG: 300 TABLET, FILM COATED ORAL at 10:38

## 2021-09-07 ASSESSMENT — PAIN SCALES - GENERAL
PAINLEVEL_OUTOF10: 2
PAINLEVEL_OUTOF10: 5
PAINLEVEL_OUTOF10: 0
PAINLEVEL_OUTOF10: 0

## 2021-09-07 NOTE — GROUP NOTE
Group Therapy Note    Date: 9/6/2021    Group Start Time: 1935  Group End Time: 2014  Group Topic: Wrap-Up    [facility] [address] [address], RN        Group Therapy Note    Attendees: 15/21

## 2021-09-07 NOTE — GROUP NOTE
Group Therapy Note    Date: 9/7/2021    Group Start Time: 1000  Group End Time: 5242  Group Topic: Psychoeducation    [facility] 1    Mae Adams, CTRS        Group Therapy Note    Number of participants: 14  Type of group: Psychoeducation  Mode of intervention: Education, Support, Socialization, Exploration, Clarifying, and Problem-solving  Topic: Grounding Techniques  Objective: Pt will identify 1 way to utilize grounding techniques in recovery. Patient's Goal:  \"Work on discharge plan\"     Notes:  Pt was interactive during group sharing 1 way to utilize grounding techniques in recovery. Pt gave support and feedback to others. Status After Intervention:  Improved    Participation Level:  Active Listener and Interactive    Participation Quality: Appropriate, Attentive, Sharing and Supportive      Speech:  normal      Thought Process/Content: Logical      Affective Functioning: Congruent      Mood: euthymic      Level of consciousness:  Alert, Oriented x4 and Attentive      Response to Learning: Able to verbalize current knowledge/experience, Able to verbalize/acknowledge new learning, Able to retain information, Capable of insight, Able to change behavior and Progressing to goal      Endings: None Reported    Modes of Intervention: Education, Support, Socialization, Exploration, Clarifying and Problem-solving

## 2021-09-07 NOTE — PROGRESS NOTES
BEHAVIORAL HEALTH FOLLOW-UP NOTE     9/7/2021     Patient was seen and examined in person, Chart reviewed   Patient's case discussed with staff/team    Chief Complaint: \" I am doing okay I am still little tired\"    Interim History: Patient seen in his room states he feels a little bit tired from the medications. He denies SI/HI intent or plan denies any auditory visualizations states she is feels calmer and better. He states he has an outpatient doctor's appointment tomorrow. He is eating well sleeping well no neurovegetative signs of depression peers attending groups social select peers in the unit. Appetite:   [x] Normal/Unchanged  [] Increased  [] Decreased      Sleep:       [x] Normal/Unchanged  [] Fair       [] Poor              Energy:    [x] Normal/Unchanged  [] Increased  [] Decreased        SI [] Present  [x] Absent    HI  []Present  [x] Absent     Aggression:  [] yes  [x] no    Patient is [x] able  [] unable to CONTRACT FOR SAFETY     PAST MEDICAL/PSYCHIATRIC HISTORY:   History reviewed. No pertinent past medical history. FAMILY/SOCIAL HISTORY:  Family History   Problem Relation Age of Onset    Other Father      Social History     Socioeconomic History    Marital status: Single     Spouse name: Not on file    Number of children: 11    Years of education: 11th    Highest education level: Not on file   Occupational History    Not on file   Tobacco Use    Smoking status: Current Every Day Smoker     Packs/day: 1.00     Types: Cigarettes, E-Cigarettes    Smokeless tobacco: Never Used   Vaping Use    Vaping Use: Never used   Substance and Sexual Activity    Alcohol use: Not Currently     Comment: quit 2 months ago, use to drink daily     Drug use: Yes     Frequency: 80.0 times per week     Types: Marijuana, Cocaine     Comment: quit Heroine 2 years ago, states tried a few lines of cocaine a few days ago.      Sexual activity: Yes     Partners: Female   Other Topics Concern    Not on file LABMETH NOT DETECTED 09/03/2021    OPIATESCREENURINE NOT DETECTED 09/03/2021    PHENCYCLIDINESCREENURINE NOT DETECTED 09/03/2021    ETOH <10 09/03/2021     Lab Results   Component Value Date    TSH 1.590 06/12/2021     No results found for: LITHIUM  No results found for: VALPROATE, CBMZ    Treatment Plan:  Reviewed current Medications with the patient. Risks, benefits, side effects, drug-to-drug interactions and alternatives to treatment were discussed. Collateral information:   CD evaluation  Encourage patient to attend group and other milieu activities.   Discharge planning discussed with the patient and treatment team.    Continue Trileptal 300 mg twice daily  Continue Zyprexa 5 mg at bedtime    PSYCHOTHERAPY/COUNSELING:  [x] Therapeutic interview  [x] Supportive  [] CBT  [] Ongoing  [] Other    [x] Patient continues to need, on a daily basis, active treatment furnished directly by or requiring the supervision of inpatient psychiatric personnel      Anticipated Length of stay: 3 to 7 days based on stability will            Electronically signed by RASHEEDA Xiong CNP on 0/8/9077 at 9:14 AM

## 2021-09-07 NOTE — PROGRESS NOTES
PT. APPOINTMENT CONFIRMED WITH PCP TOMORROW AT 1PM. PT. IS NOT SCHEDULED FOR BIOPSY AS PER PT. AND FAMILY REPORT. Antonio Jaime, ROBERTO AWARE. Chapis Jason PT. DENIES SUICIDAL IDEATIONS. PT. REPORTS SAID DAME TO GET TO HOSPITAL TO GET REMERON REFILL. PT. CONTINUES MEDICATION COMPLIANT AND ATTENDS GROUPS. IN CONTROL WITH NO UNIT PROBLEMS.

## 2021-09-07 NOTE — PLAN OF CARE
Problem: Depressive Behavior With or Without Suicide Precautions:  Goal: Able to verbalize and/or display a decrease in depressive symptoms  Description: Able to verbalize and/or display a decrease in depressive symptoms  Outcome: Ongoing     Problem: Depressive Behavior With or Without Suicide Precautions:  Goal: Ability to disclose and discuss suicidal ideas will improve  Description: Ability to disclose and discuss suicidal ideas will improve  9/7/2021 1815 by Samanta Berg RN  Outcome: Ongoing   Pt is out on the unit. On approach his affect is sad and he was at first superficial but did become quite talkative and vented about how he has been feeling for several years. He states that he has always felt somewhat depressed but is worsened after the death of his best friend in 2019. He denies any suicidal ideations and then after talking about how he thinks he might have cancer he states that he hopes that he does because he is ready for it to be over because he has 5 kids with 5 different baby mamas and pays out so much child support that he is not getting anywhere in life.

## 2021-09-07 NOTE — GROUP NOTE
Group Therapy Note    Date: 9/7/2021    Group Start Time: 1100  Group End Time: 1130  Group Topic: Cognitive Skills    [facility] 1    DEDE Bui LSW        Group Therapy Note    Attendees: 15         Patient's Goal:  To participate in the group discussion on fair fighting rules. Notes:  Pt was an active participant in group. Status After Intervention:  Improved    Participation Level:  Active Listener and Interactive    Participation Quality: Appropriate, Attentive, Sharing and Supportive      Speech:  normal      Thought Process/Content: Logical      Affective Functioning: Congruent      Mood: anxious      Level of consciousness:  Alert and Oriented x4      Response to Learning: Able to verbalize current knowledge/experience      Endings: None Reported    Modes of Intervention: Education, Support, Socialization, Exploration, Clarifying and Problem-solving      Discipline Responsible: /Counselor      Signature:  DEDE Gu LSW

## 2021-09-08 PROCEDURE — 6370000000 HC RX 637 (ALT 250 FOR IP): Performed by: NURSE PRACTITIONER

## 2021-09-08 PROCEDURE — 99232 SBSQ HOSP IP/OBS MODERATE 35: CPT | Performed by: NURSE PRACTITIONER

## 2021-09-08 PROCEDURE — 1240000000 HC EMOTIONAL WELLNESS R&B

## 2021-09-08 RX ADMIN — OXCARBAZEPINE 300 MG: 300 TABLET, FILM COATED ORAL at 09:54

## 2021-09-08 RX ADMIN — ACETAMINOPHEN 650 MG: 325 TABLET ORAL at 13:46

## 2021-09-08 RX ADMIN — OXCARBAZEPINE 300 MG: 300 TABLET, FILM COATED ORAL at 20:44

## 2021-09-08 RX ADMIN — OLANZAPINE 5 MG: 5 TABLET, FILM COATED ORAL at 20:44

## 2021-09-08 ASSESSMENT — PAIN SCALES - GENERAL
PAINLEVEL_OUTOF10: 0
PAINLEVEL_OUTOF10: 4

## 2021-09-08 NOTE — PLAN OF CARE
[facility]  Day 3 Interdisciplinary Treatment Plan NOTE    Review Date & Time: 9/8/21 0900    Patient was in treatment team    Estimated Length of Stay Update:   5 days  Estimated Discharge Date Update: Tomorrow or Fri    EDUCATION:   Learner Progress Toward Treatment Goals: Reviewed results and recommendations of this team    Method: Group    Outcome: Needs reinforcement    PATIENT GOALS: \"stay positive and not let this place bring me down\"    PLAN/TREATMENT RECOMMENDATIONS UPDATE: assess for self harm, suicide risk, groups encouraged, medications as prec=scribed, supportive care, discharge planning and follow up    GOALS UPDATE:   Time frame for Short-Term Goals: 5 days    Pt. Is quiet and seclusive after treatment team. Pt. Denied any suicidal thoughts or death wish. Pt. denied stating would be better off not being alive or getting cancer so not to have to deal with stressors. Pt. out for meal, no groups. Pt. Has been medication complaint and voiced no reason for continued treatment. Affect flat, mood slightly irritable, evasive.        Debora Flores RN

## 2021-09-08 NOTE — PLAN OF CARE
Problem: Depressive Behavior With or Without Suicide Precautions:  Goal: Able to verbalize acceptance of life and situations over which he or she has no control  Description: Able to verbalize acceptance of life and situations over which he or she has no control  Outcome: Ongoing  Pt. Is concerned about health issues and missing doctor appt. today. Goal: Absence of self-harm  Description: Absence of self-harm  Outcome: Met This Shift  No self harm behaviors reported or observed.

## 2021-09-08 NOTE — PROGRESS NOTES
BEHAVIORAL HEALTH FOLLOW-UP NOTE     9/8/2021     Patient was seen and examined in person, Chart reviewed   Patient's case discussed with staff/team    Chief Complaint: \" I never said that I hope I have cancer\"    Interim History: Patient seen during treatment team.  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 \"  He is limited sign judgment to hospitalization need for treatment. He denies SI/HI intent or plan denies any auditory visualizations states she is feels calmer and better. He is eating well sleeping well no neurovegetative signs of depression peers attending groups social with select peers on the unit. Appetite:   [x] Normal/Unchanged  [] Increased  [] Decreased      Sleep:       [x] Normal/Unchanged  [] Fair       [] Poor              Energy:    [x] Normal/Unchanged  [] Increased  [] Decreased        SI [] Present  [x] Absent    HI  []Present  [x] Absent     Aggression:  [] yes  [x] no    Patient is [x] able  [] unable to CONTRACT FOR SAFETY     PAST MEDICAL/PSYCHIATRIC HISTORY:   History reviewed. No pertinent past medical history.     FAMILY/SOCIAL HISTORY:  Family History   Problem Relation Age of Onset    Other Father      Social History     Socioeconomic History    Marital status: Single     Spouse name: Not on file    Number of children: 11    Years of education: 11th    Highest education level: Not on file   Occupational History    Not on file   Tobacco Use    Smoking status: Current Every Day Smoker     Packs/day: 1.00     Types: Cigarettes, E-Cigarettes    Smokeless tobacco: Never Used   Vaping Use    Vaping Use: Never used   Substance and Sexual Activity    Alcohol use: Not Currently     Comment: quit 2 months ago, use to drink daily     Drug use: Yes     Frequency: 80.0 times per week     Types: Marijuana, Cocaine     Comment: quit Heroine 2 years ago, states tried a few lines of cocaine a few days ago.  Sexual activity: Yes     Partners: Female   Other Topics Concern    Not on file   Social History Narrative    Not on file     Social Determinants of Health     Financial Resource Strain:     Difficulty of Paying Living Expenses:    Food Insecurity:     Worried About Running Out of Food in the Last Year:     [address] in the Last Year:    Transportation Needs:     Lack of Transportation (Medical):  Lack of Transportation (Non-Medical):    Physical Activity:     Days of Exercise per Week:     Minutes of Exercise per Session:    Stress:     Feeling of Stress :    Social Connections:     Frequency of Communication with Friends and Family:     Frequency of Social Gatherings with Friends and Family:     Attends Caodaism Services:     Active Member of Clubs or Organizations:     Attends Club or Organization Meetings:     Marital Status:    Intimate Partner Violence:     Fear of Current or Ex-Partner:     Emotionally Abused:     Physically Abused:     Sexually Abused:            ROS:  [x] All negative/unchanged except if checked.  Explain positive(checked items) below:  [] Constitutional  [] Eyes  [] Ear/Nose/Mouth/Throat  [] Respiratory  [] CV  [] GI  []   [] Musculoskeletal  [] Skin/Breast  [] Neurological  [] Endocrine  [] Heme/Lymph  [] Allergic/Immunologic    Explanation:     MEDICATIONS:    Current Facility-Administered Medications:     OXcarbazepine (TRILEPTAL) tablet 300 mg, 300 mg, Oral, BID, RASHEEDA Brown - CNP, 040 mg at 09/08/21 0954    OLANZapine (ZYPREXA) tablet 5 mg, 5 mg, Oral, Nightly, RASHEDEA Carter - CNP, 5 mg at 09/07/21 2054    acetaminophen (TYLENOL) tablet 650 mg, 650 mg, Oral, Q6H PRN, Jaswinder Ackerman APRN - CNP, 650 mg at 09/08/21 1346   magnesium hydroxide (MILK OF MAGNESIA) 400 MG/5ML suspension 30 mL, 30 mL, Oral, Daily PRN, Rodney Mcmillan APRN - CNP    nicotine (NICODERM CQ) 21 MG/24HR 1 patch, 1 patch, TransDERmal, Daily, RASHEEDA Okeefe - CNP    aluminum & magnesium hydroxide-simethicone (MAALOX) 200-200-20 MG/5ML suspension 30 mL, 30 mL, Oral, PRN, Rodney Deyanira, APRN - CNP    hydrOXYzine (VISTARIL) capsule 50 mg, 50 mg, Oral, TID PRN, Rodney Deyanira APRN - CNP    haloperidol (HALDOL) tablet 5 mg, 5 mg, Oral, Q6H PRN **OR** haloperidol lactate (HALDOL) injection 5 mg, 5 mg, IntraMUSCular, Q6H PRN, RASHEEDA Okeefe - CNP    traZODone (DESYREL) tablet 50 mg, 50 mg, Oral, Nightly PRN, RASHEEDA Okeefe - CNP, 50 mg at 09/06/21 2110      Examination:  /62   Pulse 80   Temp 97.3 °F (36.3 °C) (Oral)   Resp 16   Ht 5' 10\" (1.778 m)   Wt 175 lb (79.4 kg)   SpO2 96%   BMI 25.11 kg/m²   Gait - steady  Medication side effects(SE): Denies    Mental Status Examination:    Level of consciousness:  within normal limits   Appearance:  fair grooming and fair hygiene  Behavior/Motor:  no abnormalities noted  Attitude toward examiner:  cooperative  Speech:  spontaneous, normal rate and normal volume   Mood: \" My mood is good. \"  Affect: Appropriate and pleasant  Thought processes: Linear without flight of ideas loose associations  Thought content: Devoid of any auditory visualizations delusions or other perceptual abnormalities. Denies SI/HI intent or plan  Cognition:  oriented to person, place, and time   Concentration poor  Insight poor   Judgement poor     ASSESSMENT:   Patient symptoms are:  [] Well controlled  [x] Improving  [] Worsening  [] No change      Diagnosis:   Principal Problem:    Bipolar 1 disorder, mixed, moderate (HCC)  Active Problems:    Polysubstance abuse ([address]  Resolved Problems:    * No resolved hospital problems. *      LABS:    No results for input(s): WBC, HGB, PLT in the last 72 hours.   No results for input(s): NA, K, CL, CO2, BUN, CREATININE, GLUCOSE in the last 72 hours. No results for input(s): BILITOT, ALKPHOS, AST, ALT in the last 72 hours. Lab Results   Component Value Date    LABAMPH NOT DETECTED 09/03/2021    BARBSCNU NOT DETECTED 09/03/2021    LABBENZ NOT DETECTED 09/03/2021    LABMETH NOT DETECTED 09/03/2021    OPIATESCREENURINE NOT DETECTED 09/03/2021    PHENCYCLIDINESCREENURINE NOT DETECTED 09/03/2021    ETOH <10 09/03/2021     Lab Results   Component Value Date    TSH 1.590 06/12/2021     No results found for: LITHIUM  No results found for: VALPROATE, CBMZ    Treatment Plan:  Reviewed current Medications with the patient. Risks, benefits, side effects, drug-to-drug interactions and alternatives to treatment were discussed. Collateral information:   CD evaluation  Encourage patient to attend group and other milieu activities.   Discharge planning discussed with the patient and treatment team.    Continue Trileptal 300 mg twice daily  Continue Zyprexa 5 mg at bedtime    PSYCHOTHERAPY/COUNSELING:  [x] Therapeutic interview  [x] Supportive  [] CBT  [] Ongoing  [] Other    [x] Patient continues to need, on a daily basis, active treatment furnished directly by or requiring the supervision of inpatient psychiatric personnel      Anticipated Length of stay: 3 to 7 days based on stability will            Electronically signed by RASHEEDA Thompson CNP on 0/5/2491 at 2:42 PM

## 2021-09-08 NOTE — PROGRESS NOTES
Attended afternoon meet and greet. Updated on staffing and evening schedule . Participated in logos trivia.

## 2021-09-08 NOTE — PROGRESS NOTES
Was isolative beginning of shift but did come out after dinner but stayed to self, drawing. Denies SI/HI/AV hallucinations. Denies anxiety or depression. Verbalizes that he has possible cancer or leukemia to right side of neck and under right jaw was suppose to go to appointment today at 1:00pm Martha Man for physical to get refer for self pay due to does not have insurance to go to infectious disease & hematology to have diagnosis done of neck and jaw. Verbalizes appetite is really good. Sleep is good.

## 2021-09-08 NOTE — PROGRESS NOTES
Attended community meeting. Updated on staffing and daily expectations. Shared goal for the day as to stay positive and not let this place bring me down .

## 2021-09-08 NOTE — GROUP NOTE
Group Therapy Note    Date: 9/8/2021    Group Start Time: 1000  Group End Time: 3997  Group Topic: Psychoeducation    [facility]    Nael Krueger, [address]                                                                        Group Therapy Note    Date: 9/8/2021  Type of Group: Psychoeducation    Wellness Binder Information  Module Name:  healthy vs unhealthy relationships     Patient's Goal:  patient will be able to aspects of healthy relationships    Notes:  pleasant and sharing in group, willing to share when prompted. Status After Intervention:  Improved    Participation Level:  Active Listener and Interactive    Participation Quality: Appropriate, Attentive, Sharing, and Supportive      Speech: normal     Thought Process/Content: Logical      Affective Functioning: Congruent      Mood: euthymic      Level of consciousness:  Alert, Oriented x4, and Attentive      Response to Learning: Able to verbalize current knowledge/experience, Able to verbalize/acknowledge new learning, and Progressing to goal      Endings: None Reported    Modes of Intervention: Education, Support, Socialization, Exploration, and Problem-solving      Discipline Responsible: Psychoeducational Specialist      Signature:  Nancy Valera

## 2021-09-09 VITALS
HEART RATE: 70 BPM | RESPIRATION RATE: 14 BRPM | OXYGEN SATURATION: 96 % | TEMPERATURE: 97.1 F | DIASTOLIC BLOOD PRESSURE: 61 MMHG | BODY MASS INDEX: 25.05 KG/M2 | WEIGHT: 175 LBS | HEIGHT: 70 IN | SYSTOLIC BLOOD PRESSURE: 111 MMHG

## 2021-09-09 PROCEDURE — 6370000000 HC RX 637 (ALT 250 FOR IP): Performed by: NURSE PRACTITIONER

## 2021-09-09 PROCEDURE — 99239 HOSP IP/OBS DSCHRG MGMT >30: CPT | Performed by: NURSE PRACTITIONER

## 2021-09-09 RX ORDER — OXCARBAZEPINE 300 MG/1
300 TABLET, FILM COATED ORAL 2 TIMES DAILY
Qty: 60 TABLET | Refills: 0 | Status: SHIPPED | OUTPATIENT
Start: 2021-09-09 | End: 2021-11-29

## 2021-09-09 RX ORDER — OLANZAPINE 5 MG/1
5 TABLET ORAL NIGHTLY
Qty: 30 TABLET | Refills: 0 | Status: SHIPPED | OUTPATIENT
Start: 2021-09-09 | End: 2021-12-06 | Stop reason: ALTCHOICE

## 2021-09-09 RX ADMIN — ACETAMINOPHEN 650 MG: 325 TABLET ORAL at 11:27

## 2021-09-09 RX ADMIN — OXCARBAZEPINE 300 MG: 300 TABLET, FILM COATED ORAL at 08:56

## 2021-09-09 ASSESSMENT — PAIN SCALES - GENERAL
PAINLEVEL_OUTOF10: 0
PAINLEVEL_OUTOF10: 0
PAINLEVEL_OUTOF10: 3

## 2021-09-09 NOTE — PLAN OF CARE
Problem: Depressive Behavior With or Without Suicide Precautions:  Goal: Able to verbalize and/or display a decrease in depressive symptoms  Description: Able to verbalize and/or display a decrease in depressive symptoms  Outcome: Ongoing  Mood irritable, anxious. Pt. Declined to sign a voluntary consent. Goal: Ability to disclose and discuss suicidal ideas will improve  Description: Ability to disclose and discuss suicidal ideas will improve  Outcome: Met This Shift  Pt. Denies active suicidal ideations.

## 2021-09-09 NOTE — GROUP NOTE
Group Therapy Note    Date: 9/9/2021    Group Start Time: 1100  Group End Time: 1130  Group Topic: Cognitive Skills    [facility]    DEDE Bui LSW        Group Therapy Note    Attendees: 12         Patient's Goal: To participate in group discussion on goal planning and time management. Notes:  Pt was an active participant in group. Status After Intervention:  Improved    Participation Level:  Active Listener and Interactive    Participation Quality: Appropriate, Attentive, Sharing and Supportive      Speech:  normal      Thought Process/Content: Logical      Affective Functioning: Congruent      Mood: anxious      Level of consciousness:  Alert and Oriented x4      Response to Learning: Able to verbalize current knowledge/experience      Endings: None Reported    Modes of Intervention: Education, Support, Socialization, Exploration, Clarifying and Problem-solving      Discipline Responsible: /Counselor      Signature:  DEDE Polanco, [place]WR[place]

## 2021-09-09 NOTE — SUICIDE SAFETY PLAN
SAFETY PLAN    A suicide Safety Plan is a document that supports someone when they are having thoughts of suicide. Warning Signs that indicate a suicidal crisis may be developing: What (situations, thoughts, feelings, body sensations, behaviors, etc.) do you experience that lets you know you are beginning to think about suicide? 1. isolation  2.  distance from family  3.  irritable    Internal Coping Strategies:  What things can I do (relaxation techniques, hobbies, physical activities, etc.) to take my mind off my problems without contacting another person? 1. music  2.  writing  3.  art    People and social settings that provide distraction: Who can I call or where can I go to distract me? 1. Name: brothers, parents  Phone: [phone], [phone]  2. Name: carlo Phone: [phone]   3. Place: outdoors            4. Place:  park    People whom I can ask for help: Who can I call when I need help - for example, friends, family, clergy, someone else? 1. Name: call fiance                Phone:  As  above  2. Name: parents  Phone: as above  3. Name: call 80 or 911      Professionals or [address] I can contact during a crisis: Who can I call for help - for example, my doctor, my psychiatrist, my psychologist, a mental health provider, a suicide hotline? 1. Clinician Name: [address] [place] [address]   Phone: [phone] Pager or Emergency Contact #: [phone]      6. Suicide Prevention Lifeline: 1-583-228-TALK (3000)    3. [address] [place] [address] Emergency Services -  for example, [facility] suicide hotline, [facility] Hotline: 211      Emergency Services Address: [facility]      Emergency Services Phone: [phone]    Making the environment safe: How can I make my environment (house/apartment/living space) safer? For example, can I remove guns, medications, and other items? 1. NO GUNS  2.  CLEAN AND SOBER LIFESTYLE

## 2021-09-09 NOTE — PROGRESS NOTES
CLINICAL PHARMACY NOTE: MEDS TO BEDS    Total # of Prescriptions Filled: 2   The following medications were delivered to the patient:  · Oxcarbazepine 300mg  · Olanzapine 5mg    Additional Documentation:

## 2021-09-09 NOTE — CARE COORDINATION
MALCOLM contacted pt and informed him of follow up appointment on 9/13 at 1:30.      In order to ensure appropriate transition and discharge planning is in place, the following documents have been transmitted to [facility], as the new outpatient provider:     The d/c diagnosis was transmitted to the next care provider   The reason for hospitalization was transmitted to the next care provider   The d/c medications (dosage and indication) were transmitted to the next care provider    The continuing care plan was transmitted to the next care provider
SW attempted to contact [facility] to schedule pt. No answer. MALCOLM left voicemail with Joleen.
SW called [facility] and LVM to set pt up with appointment.
SW contacted [facility] several times on 9/8 and 9/9 to schedule pt with appointments. Pt does not have insurance and [facility] will provide services for pt on a sliding scale. Pt is a previous pt at [facility]. MALCOLM unable to schedule appointments prior to pt d/c. SW to contact pt when appointments are scheduled. Pt has been provided the contact information for [facility].
anxiety and depression a 0/10. Pt denies feeling hopeless/ helpless, but admits to having little interest and pleasure in doing things. Gender  [x] Male [] Female [] Transgender  [] Other    Sexual Orientation    [x] Heterosexual [] Homosexual [] Bisexual [] Other    Suicidal Ideation  [x] Past [] Present [] Denies     Homicidal Ideation  [] Past [] Present [x] Denies     Hallucinations/Delusions (Specify type)  [] Reports [x] Denies     Substance Use/Alcohol Use/Addiction  [x] Reports [] Denies     Tobacco Use (within the last 6 months)  [x] Reports [] Denies     Trauma History  [] Reports [x] Denies     Collateral Contact (LISA signed)  Name: Marta Phillips  Relationship: Orestes  Father  Number: 21     Collateral Information: SW called pt's father Srinath Ames for collateral. Srinath Ames stated that the pt has been depressed due to situational problems due to having multiple children with different females. Pt is having a hard time getting on his feet due to the child support and having a new born daughter with his fiance. Pt's friend passed away 1.5 years ago and this has been hard for the pt to deal with. Pt gets depressed every now and then, a couple times per year. Pt is currently living with his GF and there are no weapons in the home. Pt had a gun years ago and he fired it in [place] and got charges against him and is not allowed to have a weapon. There are no concerns about pt's DC. Father verified that the pt has an appointment for his Lymph nodes on Wednesday and his mother was going to take him to the appointment.        Access to Weapons per Collateral Contact: [] Reports [x] Denies       Follow up provider preference: [facility]      Plan for discharge  Location (where do they plan on discharging to?): Home to live with Fiance    Transportation (who will pick them up at discharge?) Father or Fiance    Medications (will they have money for copays at discharge?): Pt has no insurance

## 2021-09-09 NOTE — DISCHARGE SUMMARY
DISCHARGE SUMMARY      Patient ID:  Terra Patel  [id number]  16 y.o.  1990    Admit date: 9/3/2021    Discharge date and time: 9/9/2021    Admitting Physician: Ahsan Ng MD     Discharge Physician: Dr Jyoti Ramírez MD    Discharge Diagnoses:   Patient Active Problem List   Diagnosis    Depression, acute    Severe episode of recurrent major depressive disorder, without psychotic features ([address]    Major depressive disorder, recurrent, severe without psychotic features ([address]    MDD (major depressive disorder), recurrent, severe, with psychosis ([address]    Bipolar 1 disorder, mixed, moderate ([address]    Polysubstance abuse ([facility] [address]       Admission Condition: poor    Discharged Condition: stable    Admission Circumstance: presented to the ED for suicidal ideations. He reports increase in anxiety and having \"ups and downs and states that some days he does not shower or do the basic things he needs to take care of himself. PAST MEDICAL/PSYCHIATRIC HISTORY:   History reviewed. No pertinent past medical history. FAMILY/SOCIAL HISTORY:  Family History   Problem Relation Age of Onset    Other Father      Social History     Socioeconomic History    Marital status: Single     Spouse name: Not on file    Number of children: 11    Years of education: 11th    Highest education level: Not on file   Occupational History    Not on file   Tobacco Use    Smoking status: Current Every Day Smoker     Packs/day: 1.00     Types: Cigarettes, E-Cigarettes    Smokeless tobacco: Never Used   Vaping Use    Vaping Use: Never used   Substance and Sexual Activity    Alcohol use: Not Currently     Comment: quit 2 months ago, use to drink daily     Drug use: Yes     Frequency: 80.0 times per week     Types: Marijuana, Cocaine     Comment: quit Heroine 2 years ago, states tried a few lines of cocaine a few days ago.      Sexual activity: Yes     Partners: Female   Other Topics Concern    Not on file   Social History Narrative  Not on file     Social Determinants of Health     Financial Resource Strain:     Difficulty of Paying Living Expenses:    Food Insecurity:     Worried About Running Out of Food in the Last Year:     [address] in the Last Year:    Transportation Needs:     Lack of Transportation (Medical):      Lack of Transportation (Non-Medical):    Physical Activity:     Days of Exercise per Week:     Minutes of Exercise per Session:    Stress:     Feeling of Stress :    Social Connections:     Frequency of Communication with Friends and Family:     Frequency of Social Gatherings with Friends and Family:     Attends Restoration[employer] Services:     Active Member of Clubs or Organizations:     Attends Club or Organization Meetings:     Marital Status:    Intimate Partner Violence:     Fear of Current or Ex-Partner:     Emotionally Abused:     Physically Abused:     Sexually Abused:        MEDICATIONS:    Current Facility-Administered Medications:     OXcarbazepine (TRILEPTAL) tablet 300 mg, 300 mg, Oral, BID, RASHEEDA Anguiano CNP, 740 mg at 09/09/21 0856    OLANZapine (ZYPREXA) tablet 5 mg, 5 mg, Oral, Nightly, RASHEEDA Anguiano CNP, 5 mg at 09/08/21 2044    acetaminophen (TYLENOL) tablet 650 mg, 650 mg, Oral, Q6H PRN, RASHEEDA Tate CNP, 650 mg at 09/09/21 1127    magnesium hydroxide (MILK OF MAGNESIA) 400 MG/5ML suspension 30 mL, 30 mL, Oral, Daily PRN, RASHEEDA Tate CNP    nicotine (NICODERM CQ) 21 MG/24HR 1 patch, 1 patch, TransDERmal, Daily, RASHEEDA Tate CNP    aluminum & magnesium hydroxide-simethicone (MAALOX) 200-200-20 MG/5ML suspension 30 mL, 30 mL, Oral, PRN, RASHEEDA Tate - CNP    hydrOXYzine (VISTARIL) capsule 50 mg, 50 mg, Oral, TID PRN, RASHEEDA Tate CNP    haloperidol (HALDOL) tablet 5 mg, 5 mg, Oral, Q6H PRN **OR** haloperidol lactate (HALDOL) injection 5 mg, 5 mg, IntraMUSCular, Q6H PRN, RASHEEDA Tate CNP    traZODone (DESYREL) tablet 50 mg, 50 mg, Oral, Nightly PRN, Delma Browning, APRN - CNP, 50 mg at 09/06/21 2110    Examination:  /61   Pulse 70   Temp 97.1 °F (36.2 °C) (Temporal)   Resp 14   Ht 5' 10\" (1.778 m)   Wt 175 lb (79.4 kg)   SpO2 96%   BMI 25.11 kg/m²   Gait - steady    HOSPITAL COURSE[de-identified]   Patient was admitted to the unit on 9/3/2021 was closely monitored for suicidal ideations. He was evaluated was treated with Trileptal which is optimized up to 300 mg twice daily and Zyprexa 5 mg at bedtime. Medical events were insignificant and patient continued to improve on the floor. He start coming out of his room he is attending groups to socializing with peers. He never made any suicidal statements or any suicidal gestures while in the unit. Social workers obtain collateral information from patient's father  who was able to voicing concerns that he had.  he reported no safety concerns no access to any guns. Treatment team felt the patient obtain the maximum benefit from his hospitalization he was set up with an outpatient mental health agency for outpatient follow-up services. At the time of discharge patient did not show any impulsive behavior. He was up on the unit he was attending groups and socializing with peers. He vehemently denied any suicidal homicidal ideations intent or plan. He was eating well and sleeping well there are no neurovegetative signs or symptoms of depression he denied any auditory or visual hallucinations. There are no overt or covert signs of psychosis. He was appreciative of the help that he received here. This patient no longer meets criteria for inpatient hospitalization.           No AVH or paranoid thoughts  No hopeless or worthless feeling  No active SI/HI  Appetite:  [x] Normal  [] Increased  [] Decreased    Sleep:       [x] Normal  [] Fair       [] Poor            Energy:    [x] Normal  [] Increased  [] Decreased     SI [] Present  [x] Absent  HI  []Present  [x] Absent   Aggression:  [] yes  [x] no  Patient is [x] able  [] unable to CONTRACT FOR SAFETY   Medication side effects(SE):  [x] None(Psych. Meds.) [] Other      Mental Status Examination on discharge:    Level of consciousness:  within normal limits   Appearance:  well-appearing  Behavior/Motor:  no abnormalities noted  Attitude toward examiner:  attentive and good eye contact  Speech:  spontaneous, normal rate and normal volume   Mood: ' I feel a lot better. \"  Affect: Appropriate and pleasant  Thought processes: Linear without flight of ideas loose associations  Thought content: Devoid of any auditory visualizations delusions or any other perceptual abnormalities. Denies SI/HI intent or plan  Cognition:  oriented to person, place, and time   Concentration intact  Memory intact  Insight good   Judgement fair   Fund of Knowledge adequate      ASSESSMENT:  Patient symptoms are:  [x] Well controlled  [x] Improving  [] Worsening  [] No change    Reason for more than one antipsychotic:  [] N/A  [] 3 Failed Monotherapy attempts (Drugs tried:)  [] Crossover to a new antipsychotic  [] Taper to Monotherapy from Polypharmacy  [] Augmentation of clozapine therapy due to treatment resistance to single therapy    Diagnosis:  Principal Problem:    Bipolar 1 disorder, mixed, moderate (HCC)  Active Problems:    Polysubstance abuse ([facility] [address]  Resolved Problems:    * No resolved hospital problems. *      LABS:    No results for input(s): WBC, HGB, PLT in the last 72 hours. No results for input(s): NA, K, CL, CO2, BUN, CREATININE, GLUCOSE in the last 72 hours. No results for input(s): BILITOT, ALKPHOS, AST, ALT in the last 72 hours.   Lab Results   Component Value Date    LABAMPH NOT DETECTED 09/03/2021    BARBSCNU NOT DETECTED 09/03/2021    LABBENZ NOT DETECTED 09/03/2021    LABMETH NOT DETECTED 09/03/2021    OPIATESCREENURINE NOT DETECTED 09/03/2021    PHENCYCLIDINESCREENURINE NOT DETECTED 09/03/2021    ETOH <10 09/03/2021     Lab Results   Component Value Date    TSH 1.590 06/12/2021     No results found for: LITHIUM  No results found for: VALPROATE, CBMZ    RISK ASSESSMENT AT DISCHARGE: Low risk for suicide and homicide. Treatment Plan:  Reviewed current Medications with the patient. Education provided on the complaince with treatment. Risks, benefits, side effects, drug-to-drug interactions and alternatives to treatment were discussed. Encourage patient to attend outpatient follow up appointment and therapy. Patient was advised to call the outpatient provider, visit the nearest ED or call 911 if symptoms are not manageable. Patient's family member was contacted prior to the discharge.          Medication List      START taking these medications    OLANZapine 5 MG tablet  Commonly known as: ZYPREXA  Take 1 tablet by mouth nightly     OXcarbazepine 300 MG tablet  Commonly known as: TRILEPTAL  Take 1 tablet by mouth 2 times daily        STOP taking these medications    acetaminophen 500 MG tablet  Commonly known as: TYLENOL           Where to Get Your Medications      These medications were sent to [facility] [address] [address]    Phone: [phone]   · OLANZapine 5 MG tablet  · OXcarbazepine 300 MG tablet       Patient is counseled if he continues to abuse drugs or alcohol he could act out impulsively causing serious harm to himself or others even though may be unintentional.  He demonstrated understanding of this and has the capacity understand this    Patient is counseled hisr mental health treatment will be difficult to optimize with ongoing use of drugs or alcohol he demonstrate understanding of this as the past understand this     Patient is counseled that he he uses any amount of opiates after any clean time he could have an unintentional overdose he demonstrated understanding of this and has the capacity to understand this    Patient is counseled he must remain compliant with all medications outpatient follow-up ointments    Patient is discharged home in stable condition    TIME SPEND - 35 MINUTES TO COMPLETE THE EVALUATION, DISCHARGE SUMMARY, MEDICATION RECONCILIATION AND FOLLOW UP CARE     Signed:  RASHEEDA Lam CNP  6/5/8883  11:50 AM

## 2021-09-09 NOTE — PROGRESS NOTES
Patient attended community meeting. Shared daily goal and aware of daily activities for today. Accepting of information and support to participate in treatment. Was 1 of 16 in attendance.

## 2021-09-09 NOTE — GROUP NOTE
Group Therapy Note    Date: 9/9/2021    Group Start Time: 1000  Group End Time: 1050  Group Topic: Psychoeducation    [facility] [address]        Group Therapy Note    Attendees: 15         Patient's Goal:  Not allow others' to ruin my composure. Notes: Active and engaged during discussion on values. Able to share experiences with peers. Status After Intervention:  Improved    Participation Level:  Active Listener and Interactive    Participation Quality: Appropriate, Attentive, Sharing and Supportive      Speech:  normal      Thought Process/Content: Logical      Affective Functioning: Congruent      Mood: euthymic      Level of consciousness:  Alert and Attentive      Response to Learning: Capable of insight, Able to change behavior and Progressing to goal      Endings: None Reported    Modes of Intervention: Education, Support, Socialization and Exploration      Discipline Responsible: Psychoeducational Specialist      Signature:  Gina Donaldson [place]

## 2021-09-09 NOTE — PROGRESS NOTES
[facility]  Discharge Note    Pt discharged with followings belongings:   Clothing: Footwear, Pants, Shirt  Other Valuables: Cell phone   Valuables sent home with pt or returned to patient. Patient education on aftercare instructions:  Copy given . Information faxed to  [facility] by  , who will call pt. Today with follow up appointment. .Patient verbalize understanding of AVS:   Yes with stated potential to comply to same.     Status EXAM upon discharge:  Status and Exam  Normal: yes  Facial Expression: Elevated  Affect: Congruent  Level of Consciousness: Alert  Mood:Normal: yes, pleasant  Motor Activity:Normal: Yes  Interview Behavior:   Preception: [place] to Person, [place] to Time, [place] to Place, [place] to Situation  Attention:Normal: yes  Attention: imroved  Thought Processes: organized  Thought Content:Normal: yes  Hallucinations: None  Delusions: No  Memory:Normal: Yes  Insight and Judgment: improved  Present Suicidal Ideation: No  Present Homicidal Ideation: No      Metabolic Screening:    Lab Results   Component Value Date    LABA1C 5.1 07/28/2019       Lab Results   Component Value Date    CHOL 186 07/28/2019    CHOL 151 05/23/2019     Lab Results   Component Value Date    TRIG 98 07/28/2019    TRIG 152 (H) 05/23/2019     Lab Results   Component Value Date    HDL 65 07/28/2019    HDL 61 05/23/2019     No components found for: [facility]  Lab Results   Component Value Date    LABVLDL 20 07/28/2019    LABVLDL 30 05/23/2019       Samuel Diggs RN

## 2021-09-09 NOTE — PROGRESS NOTES
PT. DENIES ACTIVE SUICIDAL IDEATIONS. PT. REQUESTING TO BE DISCHARGED AND DECLINED A VOLUNTARY CONSENT FORM.

## 2021-09-29 ENCOUNTER — OFFICE VISIT (OUTPATIENT)
Dept: PRIMARY CARE CLINIC | Age: 31
End: 2021-09-29
Payer: MEDICAID

## 2021-09-29 VITALS
WEIGHT: 179 LBS | OXYGEN SATURATION: 95 % | HEIGHT: 70 IN | BODY MASS INDEX: 25.62 KG/M2 | HEART RATE: 67 BPM | DIASTOLIC BLOOD PRESSURE: 70 MMHG | RESPIRATION RATE: 18 BRPM | SYSTOLIC BLOOD PRESSURE: 112 MMHG

## 2021-09-29 DIAGNOSIS — F19.11 HISTORY OF INTRAVENOUS DRUG ABUSE (HCC): ICD-10-CM

## 2021-09-29 DIAGNOSIS — R59.1 LYMPHADENOPATHY OF HEAD AND NECK: ICD-10-CM

## 2021-09-29 DIAGNOSIS — R59.1 LYMPHADENOPATHY OF HEAD AND NECK: Primary | ICD-10-CM

## 2021-09-29 DIAGNOSIS — Z11.4 ENCOUNTER FOR SCREENING FOR HIV: ICD-10-CM

## 2021-09-29 DIAGNOSIS — Z11.59 NEED FOR HEPATITIS C SCREENING TEST: ICD-10-CM

## 2021-09-29 DIAGNOSIS — K21.9 GASTROESOPHAGEAL REFLUX DISEASE, UNSPECIFIED WHETHER ESOPHAGITIS PRESENT: ICD-10-CM

## 2021-09-29 DIAGNOSIS — L91.8 SKIN TAGS, MULTIPLE ACQUIRED: ICD-10-CM

## 2021-09-29 DIAGNOSIS — F33.2 SEVERE EPISODE OF RECURRENT MAJOR DEPRESSIVE DISORDER, WITHOUT PSYCHOTIC FEATURES (HCC): ICD-10-CM

## 2021-09-29 DIAGNOSIS — Z23 NEED FOR INFLUENZA VACCINATION: ICD-10-CM

## 2021-09-29 DIAGNOSIS — Z00.00 ANNUAL PHYSICAL EXAM: Primary | ICD-10-CM

## 2021-09-29 PROCEDURE — 90471 IMMUNIZATION ADMIN: CPT | Performed by: STUDENT IN AN ORGANIZED HEALTH CARE EDUCATION/TRAINING PROGRAM

## 2021-09-29 PROCEDURE — 90674 CCIIV4 VAC NO PRSV 0.5 ML IM: CPT | Performed by: STUDENT IN AN ORGANIZED HEALTH CARE EDUCATION/TRAINING PROGRAM

## 2021-09-29 PROCEDURE — 99395 PREV VISIT EST AGE 18-39: CPT | Performed by: STUDENT IN AN ORGANIZED HEALTH CARE EDUCATION/TRAINING PROGRAM

## 2021-09-29 RX ORDER — OMEPRAZOLE 40 MG/1
40 CAPSULE, DELAYED RELEASE ORAL
Qty: 30 CAPSULE | Refills: 0 | Status: SHIPPED
Start: 2021-09-29 | End: 2021-11-29 | Stop reason: SDUPTHER

## 2021-09-29 NOTE — PROGRESS NOTES
[facility]  Department of Family Medicine      Patient:  Tasha Boyd 26 y.o. male     Date of Service: 9/29/21      Chief complaint:   Chief Complaint   Patient presents with    Establish Care         History ofPresent Illness   The patient is a 26 y.o. male  presented to the clinic with complaints as above. NTP  -Hx of depression with SI   -has lymph node enlargement on right side of neck, has been going on for several years now  -notices that the size fluctuates in size   -has axillary lymph node enlargement also  -the lymph node is causing him a lot of anxiety  -is on trileptal and zyprexa due to mood, states the mood stabilizer trileptal seems to be helping, denies any HI or SI, states he is still feeling depressed though, does not have follow up with psychiatrist until October 22nd at [facility] behavioral counseling   -denies any weight loss, night sweats  -denies any chest pain or sob  -does have a lot of GERD  -does has dysphagia due to neck swelling on right       Past Medical History:      Diagnosis Date    Moderate episode of recurrent major depressive disorder ([facility] [address]        PastSurgical History:        Procedure Laterality Date    FRACTURE SURGERY      left arm age 7        Allergies:    Patient has no known allergies.     Social History:   Social History     Socioeconomic History    Marital status: Single     Spouse name: Not on file    Number of children: 11    Years of education: 11th    Highest education level: Not on file   Occupational History    Not on file   Tobacco Use    Smoking status: Former Smoker     Packs/day: 1.00     Types: Cigarettes, E-Cigarettes    Smokeless tobacco: Never Used   Vaping Use    Vaping Use: Never used   Substance and Sexual Activity    Alcohol use: Not Currently     Comment: quit 2 months ago, use to drink daily     Drug use: Yes     Frequency: 80.0 times per week     Types: Marijuana, Cocaine     Comment: quit Heroine 2 years ago, states tried a few lines of cocaine a few days ago.  Sexual activity: Yes     Partners: Female   Other Topics Concern    Not on file   Social History Narrative    Not on file     Social Determinants of Health     Financial Resource Strain:     Difficulty of Paying Living Expenses:    Food Insecurity:     Worried About Running Out of Food in the Last Year:     [address] in the Last Year:    Transportation Needs:     Lack of Transportation (Medical):  Lack of Transportation (Non-Medical):    Physical Activity:     Days of Exercise per Week:     Minutes of Exercise per Session:    Stress:     Feeling of Stress :    Social Connections:     Frequency of Communication with Friends and Family:     Frequency of Social Gatherings with Friends and Family:     Attends Mandaeism Services:     Active Member of Clubs or Organizations:     Attends Club or Organization Meetings:     Marital Status:    Intimate Partner Violence:     Fear of Current or Ex-Partner:     Emotionally Abused:     Physically Abused:     Sexually Abused:         Family History:       Problem Relation Age of Onset    Other Father        Review of Systems:   Review of Systems - as above     Physical Exam   Vitals: /70   Pulse 67   Resp 18   Ht 5' 10\" (1.778 m)   Wt 179 lb (81.2 kg)   SpO2 95%   BMI 25.68 kg/m²   Physical Exam  Constitutional:       Appearance: He is well-developed. HENT:      Head: Normocephalic and atraumatic. Mouth/Throat:      Pharynx: Pharyngeal swelling and posterior oropharyngeal erythema present. Eyes:      General:         Right eye: No discharge. Left eye: No discharge. Conjunctiva/sclera: Conjunctivae normal.   Neck:      Trachea: No tracheal deviation. Cardiovascular:      Rate and Rhythm: Normal rate and regular rhythm. Heart sounds: Normal heart sounds. Pulmonary:      Effort: Pulmonary effort is normal. No respiratory distress.       Breath sounds: Normal breath sounds. No wheezing. Abdominal:      General: Bowel sounds are normal. There is no distension. Palpations: Abdomen is soft. Tenderness: There is no abdominal tenderness. Musculoskeletal:         General: No tenderness. Cervical back: Normal range of motion and neck supple. Skin:     General: Skin is warm and dry. Neurological:      Mental Status: He is alert. Psychiatric:         Mood and Affect: Mood is depressed. Assessment and Plan       1. Annual physical exam  Needs screening tests and vaccination as below, also needs further evaluated for LAD as below. - HEPATITIS C ANTIBODY; Future  - HIV-1 AND HIV-2 ANTIBODIES; Future  - INFLUENZA, MDCK QUADV, 2 YRS AND OLDER, IM, PF, PREFILL SYR OR SDV, 0.5ML (FLUCELVAX QUADV, PF)    2. Lymphadenopathy of head and neck  Chronic issue  -Unclear etiology, will get lab work to rule out infectious etiologies, however given size of what is thought to be lymph nodes, likely needs biopsied, however will get US to get more accurate size and evaluation of the masses appreciated on physical exam  -Unclear who to refer to as lymph nodes likely at least 1 cm in size, will reach out to general surgery first as patient will also need skin tags removed and biopsied for HPV  - HEPATITIS C ANTIBODY; Future  - HIV-1 AND HIV-2 ANTIBODIES; Future  - US SOFT TISSUE LIMITED AREA; Future  - HEPATITIS PANEL, ACUTE; Future  - CYTOMEGALOVIRUS ANTIBODY, IGG; Future  - CYTOMEGALOVIRUS ANTIBODY, IGM; Future  - Venson Don Virus (EBV) Antibody Panel I; Future    3. Skin tags, multiple acquired  Chronic issue  -Will need biopsied for HPV    4. Severe episode of recurrent major depressive disorder, without psychotic features ([facility] [address]  HFU  -Doing ok on mood medications and has appropriate f/u with psych  -Denies any HI or SI  -Mood exacerbated by his LAD, therefore further evaluation of his LAD may help his mood    5.  Gastroesophageal reflux disease, unspecified whether esophagitis present  Chronic issue  -Having symptoms of acid reflux, will trial PPI to see if this improves his symptoms  - omeprazole (PRILOSEC) 40 MG delayed release capsule; Take 1 capsule by mouth every morning (before breakfast)  Dispense: 30 capsule; Refill: 0    HCM and screenin. Need for influenza vaccination  - INFLUENZA, MDCK QUADV, 2 YRS AND OLDER, IM, PF, PREFILL SYR OR SDV, 0.5ML (FLUCELVAX QUADV, PF)    7. Need for hepatitis C screening test  - HEPATITIS C ANTIBODY; Future    8. Encounter for screening for HIV  - HIV-1 AND HIV-2 ANTIBODIES; Future    9. History of intravenous drug abuse ([facility] [address]  - HEPATITIS PANEL, ACUTE; Future        Counseled regarding above diagnosis, including possible risks and complications,  especially if left uncontrolled. Counseled regarding the possible side effects, risks, benefits and alternatives to treatment;patient and/or guardian verbalizes understanding, agrees, feels comfortable with and wishes to proceed with above treatment plan. Call or go to [address] [place] if symptoms worsen or persist. Advised patient to call with any new medication issues, and, as applicable, read all Rx info from pharmacy to assure aware of all possible risks and side effects of medicationbefore taking. Patient and/or guardian given opportunity to ask questions/raise concerns. The patient verbalized comfort and understanding ofinstructions. I encourage further reading and education about your health conditions. Information on many health conditions is provided by [facility] [employer]: [email or web address]  Please bring any questions to me at your nextvisit. Return to Office: Return in about 2 months (around 2021) for f/u mood and LAD .     Medication List:    Current Outpatient Medications   Medication Sig Dispense Refill    omeprazole (PRILOSEC) 40 MG delayed release capsule Take 1 capsule by mouth every morning (before breakfast) 30 capsule 0    OLANZapine (ZYPREXA) 5 MG tablet Take 1 tablet by mouth nightly 30 tablet 0    OXcarbazepine (TRILEPTAL) 300 MG tablet Take 1 tablet by mouth 2 times daily 60 tablet 0     No current facility-administered medications for this visit. Layman Line, DO       This document may have been prepared at least partially through the use of voice recognition software. Although effort is taken to assure the accuracy ofthis document, it is possible that grammatical, syntax,  or spelling errors may occur.

## 2021-10-08 ENCOUNTER — HOSPITAL ENCOUNTER (OUTPATIENT)
Dept: ULTRASOUND IMAGING | Age: 31
Discharge: HOME | End: 2021-10-09

## 2021-10-08 DIAGNOSIS — R59.1 LYMPHADENOPATHY OF HEAD AND NECK: ICD-10-CM

## 2021-10-08 PROCEDURE — 76999 ECHO EXAMINATION PROCEDURE: CPT

## 2021-10-13 DIAGNOSIS — R59.1 LAD (LYMPHADENOPATHY): Primary | ICD-10-CM

## 2021-10-25 ENCOUNTER — TELEPHONE (OUTPATIENT)
Dept: PRIMARY CARE CLINIC | Age: 31
End: 2021-10-25

## 2021-10-26 ENCOUNTER — TELEPHONE (OUTPATIENT)
Dept: ADMINISTRATIVE | Age: 31
End: 2021-10-26

## 2021-10-26 NOTE — TELEPHONE ENCOUNTER
Spoke with patient, told him about his enlarged lymph node and need for urgent visit with general surgery. Patient states no one ever got in contact with him, therefore I gave him Dr. Hank Pace' office number, and he states he will call them right after he gets off the phone with me.     Thank Fely Ramirez

## 2021-10-26 NOTE — TELEPHONE ENCOUNTER
Patient had urgent referral for Lymphadenopathy of head and neck. Was called 3x with no response. Patient called back today to schedule. Scheduled for first available 11/10/21.  Is this okay to wait until that date or does he need seen sooner due to it being an urgent referral?

## 2021-10-27 NOTE — TELEPHONE ENCOUNTER
Pt referred 9/29. Called on 9/30 left vm. Called on 10/1 left vm  Called on 10/7 left vm. Pt can be scheduled at next open clinic day. 11/10 is fine.   Electronically signed by Lauren Rascon on 10/27/21 at 3:37 PM EDT

## 2021-11-10 ENCOUNTER — OFFICE VISIT (OUTPATIENT)
Dept: SURGERY | Age: 31
End: 2021-11-10

## 2021-11-10 VITALS
DIASTOLIC BLOOD PRESSURE: 66 MMHG | HEART RATE: 77 BPM | HEIGHT: 70 IN | SYSTOLIC BLOOD PRESSURE: 109 MMHG | TEMPERATURE: 97.8 F | WEIGHT: 175 LBS | BODY MASS INDEX: 25.05 KG/M2

## 2021-11-10 DIAGNOSIS — R59.1 LYMPHADENOPATHY: Primary | ICD-10-CM

## 2021-11-10 PROCEDURE — 99244 OFF/OP CNSLTJ NEW/EST MOD 40: CPT | Performed by: SURGERY

## 2021-11-11 ENCOUNTER — TELEPHONE (OUTPATIENT)
Dept: SURGERY | Age: 31
End: 2021-11-11

## 2021-11-11 RX ORDER — SODIUM CHLORIDE 0.9 % (FLUSH) 0.9 %
10 SYRINGE (ML) INJECTION EVERY 12 HOURS SCHEDULED
Status: CANCELLED | OUTPATIENT
Start: 2021-11-11

## 2021-11-11 RX ORDER — SODIUM CHLORIDE 9 MG/ML
INJECTION, SOLUTION INTRAVENOUS CONTINUOUS
Status: CANCELLED | OUTPATIENT
Start: 2021-11-11

## 2021-11-11 RX ORDER — SODIUM CHLORIDE 0.9 % (FLUSH) 0.9 %
10 SYRINGE (ML) INJECTION PRN
Status: CANCELLED | OUTPATIENT
Start: 2021-11-11

## 2021-11-11 RX ORDER — SODIUM CHLORIDE 9 MG/ML
25 INJECTION, SOLUTION INTRAVENOUS PRN
Status: CANCELLED | OUTPATIENT
Start: 2021-11-11

## 2021-11-11 ASSESSMENT — ENCOUNTER SYMPTOMS
BACK PAIN: 0
DIARRHEA: 0
COUGH: 0
VOMITING: 0
BLOOD IN STOOL: 0
SORE THROAT: 0
WHEEZING: 0
SHORTNESS OF BREATH: 0
NAUSEA: 0
CONSTIPATION: 0
EYE REDNESS: 0
ABDOMINAL PAIN: 0
PHOTOPHOBIA: 0

## 2021-11-11 NOTE — TELEPHONE ENCOUNTER
Prior Authorization Form:      DEMOGRAPHICS:                     Patient Name:  Ernestine Lundberg  Patient :  1990            Insurance:  Payor: / No coverage found. Insurance ID Number:    Payor/Plan Subscr  Sex Relation Sub.  Ins. ID Effective Group Num         DIAGNOSIS & PROCEDURE:                       Procedure/Operation: excisional biopsy R cervical lymph node           CPT Code: 76621    Diagnosis:  lymphadnopathy of cervical lymph node    ICD10 Code: r59.1    [facility]  [facility]    Surgeon:  Kassi Guerra INFORMATION:                          Date: 21    Time: 1045              Anesthesia:  General                                                       Status:  Outpatient        Special Comments:         Electronically signed by Sigifredo Eubanks MA on 2021 at 9:42 AM

## 2021-11-11 NOTE — H&P (VIEW-ONLY)
HISTORY OF PRESENT ILLNESS:    The patient is a 26 y.o. male who presents with complaints of swelling in lymph nodes in his neck. He states he has felt some lumps in the area for least 3 years. He denies any family history of lymphoma. He denies any weight loss, fever, or night sweats. He reports he does have cats at home. He denies any other lumps or swollen nodes. Past Medical History:   Diagnosis Date    Moderate episode of recurrent major depressive disorder ([facility] [address]        Past Surgical History:   Procedure Laterality Date    FRACTURE SURGERY      left arm age 7        Prior to Admission medications    Medication Sig Start Date End Date Taking? Authorizing Provider   omeprazole (PRILOSEC) 40 MG delayed release capsule Take 1 capsule by mouth every morning (before breakfast) 9/29/21  Yes Samira Weiss DO   OXcarbazepine (TRILEPTAL) 300 MG tablet Take 1 tablet by mouth 2 times daily 9/9/21 60/22/96 Yes RASHEEDA Montilla CNP   OLANZapine (ZYPREXA) 5 MG tablet Take 1 tablet by mouth nightly 9/9/21 23/1/26  RASHEEDA Montilla CNP       Scheduled Meds:  ContinuousInfusions:  PRN Meds:    No Known Allergies    Social History     Socioeconomic History    Marital status: Single     Spouse name: Not on file    Number of children: 11    Years of education: 11th    Highest education level: Not on file   Occupational History    Not on file   Tobacco Use    Smoking status: Former Smoker     Packs/day: 1.00     Types: Cigarettes, E-Cigarettes    Smokeless tobacco: Never Used   Vaping Use    Vaping Use: Never used   Substance and Sexual Activity    Alcohol use: Not Currently     Comment: quit 2 months ago, use to drink daily     Drug use: Yes     Frequency: 80.0 times per week     Types: Marijuana (Lissette Walker), Cocaine     Comment: quit Heroine 2 years ago, states tried a few lines of cocaine a few days ago.      Sexual activity: Yes     Partners: Female   Other Topics Concern    Not on file Social History Narrative    Not on file     Social Determinants of Health     Financial Resource Strain:     Difficulty of Paying Living Expenses: Not on file   Food Insecurity:     Worried About Running Out of Food in the Last Year: Not on file    Meg of Food in the Last Year: Not on file   Transportation Needs:     Lack of Transportation (Medical): Not on file    Lack of Transportation (Non-Medical): Not on file   Physical Activity:     Days of Exercise per Week: Not on file    Minutes of Exercise per Session: Not on file   Stress:     Feeling of Stress : Not on file   Social Connections:     Frequency of Communication with Friends and Family: Not on file    Frequency of Social Gatherings with Friends and Family: Not on file    Attends [employer] Services: Not on file    Active Member of [address] [place] [address] [employer] or Organizations: Not on file    Attends Club or Organization Meetings: Not on file    Marital Status: Not on file   Intimate Partner Violence:     Fear of Current or Ex-Partner: Not on file    Emotionally Abused: Not on file    Physically Abused: Not on file    Sexually Abused: Not on file   Housing Stability:     Unable to Pay for Housing in the Last Year: Not on file    Number of [place] in the Last Year: Not on file    Unstable Housing in the Last Year: Not on file       Family History   Problem Relation Age of Onset    Other Father        Review Of Systems:   Review of Systems   Constitutional: Negative for chills and fever. HENT: Negative for ear pain, nosebleeds, sore throat and tinnitus. Swollen lymph node   Eyes: Negative for photophobia and redness. Respiratory: Negative for cough, shortness of breath and wheezing. Cardiovascular: Negative for chest pain and palpitations. Gastrointestinal: Negative for abdominal pain, blood in stool, constipation, diarrhea, nausea and vomiting. Endocrine: Negative for polydipsia.    Genitourinary: Negative for dysuria, hematuria and urgency. Musculoskeletal: Negative for back pain and neck pain. Skin: Negative for rash. Neurological: Negative for dizziness, tremors and seizures. Hematological: Does not bruise/bleed easily. All other systems reviewed and are negative. Physical Exam:  Vitals:    11/10/21 1343   BP: 109/66   Pulse: 77   Temp: 97.8 °F (36.6 °C)   Weight: 175 lb (79.4 kg)   Height: 5' 10\" (1.778 m)       General: Well nourished, well developed, no acute distress  Eyes:  PERRL   Conjunctiva unremarkable   ENT:  TM's intact bilaterally, no effusion   Nasal:  No mucosal edema     No nasal drainage   Oral:  mucosa moist and pink   Neck:  Supple   The patient's right side, there is approximate 4 cm palpable cervical node as well as a smaller 2-1/2 cm palpable node. There is no lymphadenopathy on his left side. Thyroid without mass or enlargement  Resp: Lungs CTAB   Equal and adequate air exchange without accessory muscle use   No rales, rhonchi or wheeze  CV: S1S2 RRR   No murmur   Intact distal pulses   No edema  GI: Abdomen Soft, non tender, non distended   Normoactive bowel sounds   No palpable hepatosplenomegaly  MS: There is no axillary, supraclavicular, or inguinal lymphadenopathy palpable. Physiologic ROM of all extremities    Intact distal pulses   No clubbing or cyanosis   Skin Warm and dry; no rash or lesion  Neuro: Alert and oriented; normal and intact dtr's   Psych: Euthymic mood, congruent affect      No results found. Assessment/Plan:  3 77-year-old male with lymphadenopathy of the right neck. Will check CT of neck as well as chest.  Also plan for excisional biopsy right cervical lymph node following completion of CAT scan. Risks of the procedure were explained to the patient including, but not limited to bleeding, infection, wound breakdown, recurrence. The patient understands these risks and has consented to the procedure.

## 2021-11-11 NOTE — PROGRESS NOTES
Consult Note    Dear Lia Posada DO, thank you for referring María Bill for evaluation. Reason for Consult: Lymphadenopathy    HISTORY OF PRESENT ILLNESS:    The patient is a 26 y.o. male who presents with complaints of swelling in lymph nodes in his neck. He states he has felt some lumps in the area for least 3 years. He denies any family history of lymphoma. He denies any weight loss, fever, or night sweats. He reports he does have cats at home. He denies any other lumps or swollen nodes. Past Medical History:   Diagnosis Date    Moderate episode of recurrent major depressive disorder ([facility] [address]        Past Surgical History:   Procedure Laterality Date    FRACTURE SURGERY      left arm age 7        Prior to Admission medications    Medication Sig Start Date End Date Taking?  Authorizing Provider   omeprazole (PRILOSEC) 40 MG delayed release capsule Take 1 capsule by mouth every morning (before breakfast) 9/29/21  Yes Amrita Weiss DO   OXcarbazepine (TRILEPTAL) 300 MG tablet Take 1 tablet by mouth 2 times daily 9/9/21 18/88[birth year]41 Yes RASHEEDA Matute CNP   OLANZapine (ZYPREXA) 5 MG tablet Take 1 tablet by mouth nightly 9/9/21 60/4/12  RASHEEDA Matute CNP       Scheduled Meds:  ContinuousInfusions:  PRN Meds:    No Known Allergies    Social History     Socioeconomic History    Marital status: Single     Spouse name: Not on file    Number of children: 11    Years of education: 11th    Highest education level: Not on file   Occupational History    Not on file   Tobacco Use    Smoking status: Former Smoker     Packs/day: 1.00     Types: Cigarettes, E-Cigarettes    Smokeless tobacco: Never Used   Vaping Use    Vaping Use: Never used   Substance and Sexual Activity    Alcohol use: Not Currently     Comment: quit 2 months ago, use to drink daily     Drug use: Yes     Frequency: 80.0 times per week     Types: Marijuana (Kayy James), Cocaine     Comment: quit Heroine 2 years ago, states tried a few lines of cocaine a few days ago.  Sexual activity: Yes     Partners: Female   Other Topics Concern    Not on file   Social History Narrative    Not on file     Social Determinants of Health     Financial Resource Strain:     Difficulty of Paying Living Expenses: Not on file   Food Insecurity:     Worried About Running Out of Food in the Last Year: Not on file    Meg of Food in the Last Year: Not on file   Transportation Needs:     Lack of Transportation (Medical): Not on file    Lack of Transportation (Non-Medical): Not on file   Physical Activity:     Days of Exercise per Week: Not on file    Minutes of Exercise per Session: Not on file   Stress:     Feeling of Stress : Not on file   Social Connections:     Frequency of Communication with Friends and Family: Not on file    Frequency of Social Gatherings with Friends and Family: Not on file    Attends Alevism Services: Not on file    Active Member of [address] [place] [address] or Organizations: Not on file    Attends Club or Organization Meetings: Not on file    Marital Status: Not on file   Intimate Partner Violence:     Fear of Current or Ex-Partner: Not on file    Emotionally Abused: Not on file    Physically Abused: Not on file    Sexually Abused: Not on file   Housing Stability:     Unable to Pay for Housing in the Last Year: Not on file    Number of [place] in the Last Year: Not on file    Unstable Housing in the Last Year: Not on file       Family History   Problem Relation Age of Onset    Other Father        Review Of Systems:   Review of Systems   Constitutional: Negative for chills and fever. HENT: Negative for ear pain, nosebleeds, sore throat and tinnitus. Swollen lymph node   Eyes: Negative for photophobia and redness. Respiratory: Negative for cough, shortness of breath and wheezing. Cardiovascular: Negative for chest pain and palpitations.    Gastrointestinal: Negative for abdominal pain, blood in stool, constipation, diarrhea, nausea and vomiting. Endocrine: Negative for polydipsia. Genitourinary: Negative for dysuria, hematuria and urgency. Musculoskeletal: Negative for back pain and neck pain. Skin: Negative for rash. Neurological: Negative for dizziness, tremors and seizures. Hematological: Does not bruise/bleed easily. All other systems reviewed and are negative. Physical Exam:  Vitals:    11/10/21 1343   BP: 109/66   Pulse: 77   Temp: 97.8 °F (36.6 °C)   Weight: 175 lb (79.4 kg)   Height: 5' 10\" (1.778 m)       General: Well nourished, well developed, no acute distress  Eyes:  PERRL   Conjunctiva unremarkable   ENT:  TM's intact bilaterally, no effusion   Nasal:  No mucosal edema     No nasal drainage   Oral:  mucosa moist and pink   Neck:  Supple   The patient's right side, there is approximate 4 cm palpable cervical node as well as a smaller 2-1/2 cm palpable node. There is no lymphadenopathy on his left side. Thyroid without mass or enlargement  Resp: Lungs CTAB   Equal and adequate air exchange without accessory muscle use   No rales, rhonchi or wheeze  CV: S1S2 RRR   No murmur   Intact distal pulses   No edema  GI: Abdomen Soft, non tender, non distended   Normoactive bowel sounds   No palpable hepatosplenomegaly  MS: There is no axillary, supraclavicular, or inguinal lymphadenopathy palpable. Physiologic ROM of all extremities    Intact distal pulses   No clubbing or cyanosis   Skin Warm and dry; no rash or lesion  Neuro: Alert and oriented; normal and intact dtr's   Psych: Euthymic mood, congruent affect      No results found. Assessment/Plan:  3 24-year-old male with lymphadenopathy of the right neck. Will check CT of neck as well as chest.  Also plan for excisional biopsy right cervical lymph node following completion of CAT scan.     Risks of the procedure were explained to the patient including, but not limited to bleeding, infection, wound breakdown, recurrence. The patient understands these risks and has consented to the procedure.         Electronically signed by Kavon Melendrez DO on 11/11/21 at 10:25 AM EST

## 2021-11-11 NOTE — H&P
HISTORY OF PRESENT ILLNESS:    The patient is a 26 y.o. male who presents with complaints of swelling in lymph nodes in his neck. He states he has felt some lumps in the area for least 3 years. He denies any family history of lymphoma. He denies any weight loss, fever, or night sweats. He reports he does have cats at home. He denies any other lumps or swollen nodes. Past Medical History:   Diagnosis Date    Moderate episode of recurrent major depressive disorder ([facility] [address]        Past Surgical History:   Procedure Laterality Date    FRACTURE SURGERY      left arm age 7        Prior to Admission medications    Medication Sig Start Date End Date Taking? Authorizing Provider   omeprazole (PRILOSEC) 40 MG delayed release capsule Take 1 capsule by mouth every morning (before breakfast) 9/29/21  Yes Johanna Weiss DO   OXcarbazepine (TRILEPTAL) 300 MG tablet Take 1 tablet by mouth 2 times daily 9/9/21 35/27/77 Yes RASHEEDA Pool CNP   OLANZapine (ZYPREXA) 5 MG tablet Take 1 tablet by mouth nightly 9/9/21 93/4/64  RASHEEDA Pool CNP       Scheduled Meds:  Contin[employer]:  PRN Meds:    No Known Allergies    Social History     Socioeconomic History    Marital status: Single     Spouse name: Not on file    Number of children: 11    Years of education: 11th    Highest education level: Not on file   Occupational History    Not on file   Tobacco Use    Smoking status: Former Smoker     Packs/day: 1.00     Types: Cigarettes, E-Cigarettes    Smokeless tobacco: Never Used   Vaping Use    Vaping Use: Never used   Substance and Sexual Activity    Alcohol use: Not Currently     Comment: quit 2 months ago, use to drink daily     Drug use: Yes     Frequency: 80.0 times per week     Types: Marijuana (Josh Leavens), Cocaine     Comment: quit Heroine 2 years ago, states tried a few lines of cocaine a few days ago.      Sexual activity: Yes     Partners: Female   Other Topics Concern    Not on file Social History Narrative    Not on file     Social Determinants of Health     Financial Resource Strain:     Difficulty of Paying Living Expenses: Not on file   Food Insecurity:     Worried About Running Out of Food in the Last Year: Not on file    Meg of Food in the Last Year: Not on file   Transportation Needs:     Lack of Transportation (Medical): Not on file    Lack of Transportation (Non-Medical): Not on file   Physical Activity:     Days of Exercise per Week: Not on file    Minutes of Exercise per Session: Not on file   Stress:     Feeling of Stress : Not on file   Social Connections:     Frequency of Communication with Friends and Family: Not on file    Frequency of Social Gatherings with Friends and Family: Not on file    Attends Advent Services: Not on file    Active Member of [address] [place] [address] [employer] or Organizations: Not on file    Attends Club or Organization Meetings: Not on file    Marital Status: Not on file   Intimate Partner Violence:     Fear of Current or Ex-Partner: Not on file    Emotionally Abused: Not on file    Physically Abused: Not on file    Sexually Abused: Not on file   Housing Stability:     Unable to Pay for Housing in the Last Year: Not on file    Number of [place] in the Last Year: Not on file    Unstable Housing in the Last Year: Not on file       Family History   Problem Relation Age of Onset    Other Father        Review Of Systems:   Review of Systems   Constitutional: Negative for chills and fever. HENT: Negative for ear pain, nosebleeds, sore throat and tinnitus. Swollen lymph node   Eyes: Negative for photophobia and redness. Respiratory: Negative for cough, shortness of breath and wheezing. Cardiovascular: Negative for chest pain and palpitations. Gastrointestinal: Negative for abdominal pain, blood in stool, constipation, diarrhea, nausea and vomiting. Endocrine: Negative for polydipsia.    Genitourinary: Negative for dysuria, hematuria and urgency. Musculoskeletal: Negative for back pain and neck pain. Skin: Negative for rash. Neurological: Negative for dizziness, tremors and seizures. Hematological: Does not bruise/bleed easily. All other systems reviewed and are negative. Physical Exam:  Vitals:    11/10/21 1343   BP: 109/66   Pulse: 77   Temp: 97.8 °F (36.6 °C)   Weight: 175 lb (79.4 kg)   Height: 5' 10\" (1.778 m)       General: Well nourished, well developed, no acute distress  Eyes:  PERRL   Conjunctiva unremarkable   ENT:  TM's intact bilaterally, no effusion   Nasal:  No mucosal edema     No nasal drainage   Oral:  mucosa moist and pink   Neck:  Supple   The patient's right side, there is approximate 4 cm palpable cervical node as well as a smaller 2-1/2 cm palpable node. There is no lymphadenopathy on his left side. Thyroid without mass or enlargement  Resp: Lungs CTAB   Equal and adequate air exchange without accessory muscle use   No rales, rhonchi or wheeze  CV: S1S2 RRR   No murmur   Intact distal pulses   No edema  GI: Abdomen Soft, non tender, non distended   Normoactive bowel sounds   No palpable hepatosplenomegaly  MS: There is no axillary, supraclavicular, or inguinal lymphadenopathy palpable. Physiologic ROM of all extremities    Intact distal pulses   No clubbing or cyanosis   Skin Warm and dry; no rash or lesion  Neuro: Alert and oriented; normal and intact dtr's   Psych: Euthymic mood, congruent affect      No results found. Assessment/Plan:  3 24-year-old male with lymphadenopathy of the right neck. Will check CT of neck as well as chest.  Also plan for excisional biopsy right cervical lymph node following completion of CAT scan. Risks of the procedure were explained to the patient including, but not limited to bleeding, infection, wound breakdown, recurrence. The patient understands these risks and has consented to the procedure.

## 2021-11-29 ENCOUNTER — OFFICE VISIT (OUTPATIENT)
Dept: PRIMARY CARE CLINIC | Age: 31
End: 2021-11-29
Payer: MEDICAID

## 2021-11-29 ENCOUNTER — HOSPITAL ENCOUNTER (OUTPATIENT)
Dept: CT IMAGING | Age: 31
Discharge: HOME | End: 2021-12-01

## 2021-11-29 VITALS
HEIGHT: 70 IN | WEIGHT: 164.8 LBS | HEART RATE: 86 BPM | BODY MASS INDEX: 23.59 KG/M2 | SYSTOLIC BLOOD PRESSURE: 110 MMHG | DIASTOLIC BLOOD PRESSURE: 70 MMHG | RESPIRATION RATE: 19 BRPM | OXYGEN SATURATION: 98 % | TEMPERATURE: 97.1 F

## 2021-11-29 DIAGNOSIS — R59.1 LYMPHADENOPATHY: ICD-10-CM

## 2021-11-29 DIAGNOSIS — K21.9 GASTROESOPHAGEAL REFLUX DISEASE, UNSPECIFIED WHETHER ESOPHAGITIS PRESENT: ICD-10-CM

## 2021-11-29 DIAGNOSIS — R59.1 LYMPHADENOPATHY OF HEAD AND NECK: Primary | ICD-10-CM

## 2021-11-29 DIAGNOSIS — F31.62 BIPOLAR 1 DISORDER, MIXED, MODERATE (HCC): ICD-10-CM

## 2021-11-29 PROCEDURE — 99214 OFFICE O/P EST MOD 30 MIN: CPT | Performed by: STUDENT IN AN ORGANIZED HEALTH CARE EDUCATION/TRAINING PROGRAM

## 2021-11-29 PROCEDURE — 71260 CT THORAX DX C+: CPT

## 2021-11-29 PROCEDURE — 6360000004 HC RX CONTRAST MEDICATION: Performed by: RADIOLOGY

## 2021-11-29 PROCEDURE — 70491 CT SOFT TISSUE NECK W/DYE: CPT

## 2021-11-29 RX ORDER — ARIPIPRAZOLE 10 MG/1
15 TABLET ORAL DAILY
COMMUNITY
Start: 2021-11-08 | End: 2022-05-17

## 2021-11-29 RX ORDER — OMEPRAZOLE 40 MG/1
40 CAPSULE, DELAYED RELEASE ORAL
Qty: 30 CAPSULE | Refills: 3 | Status: SHIPPED
Start: 2021-11-29 | End: 2022-02-03

## 2021-11-29 RX ADMIN — IOPAMIDOL 75 ML: 755 INJECTION, SOLUTION INTRAVENOUS at 17:39

## 2021-11-29 NOTE — PROGRESS NOTES
Smoking status: Former Smoker     Packs/day: 1.00     Types: Cigarettes, E-Cigarettes    Smokeless tobacco: Never Used   Vaping Use    Vaping Use: Never used   Substance and Sexual Activity    Alcohol use: Not Currently     Comment: quit 2 months ago, use to drink daily     Drug use: Yes     Frequency: 80.0 times per week     Types: Marijuana (Lissette Walker), Cocaine     Comment: quit Heroine 2 years ago, states tried a few lines of cocaine a few days ago.  Sexual activity: Yes     Partners: Female   Other Topics Concern    Not on file   Social History Narrative    Not on file     Social Determinants of Health     Financial Resource Strain:     Difficulty of Paying Living Expenses: Not on file   Food Insecurity:     Worried About Running Out of Food in the Last Year: Not on file    Meg of Food in the Last Year: Not on file   Transportation Needs:     Lack of Transportation (Medical): Not on file    Lack of Transportation (Non-Medical):  Not on file   Physical Activity:     Days of Exercise per Week: Not on file    Minutes of Exercise per Session: Not on file   Stress:     Feeling of Stress : Not on file   Social Connections:     Frequency of Communication with Friends and Family: Not on file    Frequency of Social Gatherings with Friends and Family: Not on file    Attends Muslim Services: Not on file    Active Member of [address] [place] [address] or Organizations: Not on file    Attends Club or Organization Meetings: Not on file    Marital Status: Not on file   Intimate Partner Violence:     Fear of Current or Ex-Partner: Not on file    Emotionally Abused: Not on file    Physically Abused: Not on file    Sexually Abused: Not on file   Housing Stability:     Unable to Pay for Housing in the Last Year: Not on file    Number of [place] in the Last Year: Not on file    Unstable Housing in the Last Year: Not on file        Family History:       Problem Relation Age of Onset    Other Father        Review of Systems:   Review of Systems - as above     Physical Exam   Vitals: /70   Pulse 86   Temp 97.1 °F (36.2 °C) (Infrared)   Resp 19   Ht 5' 10\" (1.778 m)   Wt 164 lb 12.8 oz (74.8 kg)   SpO2 98%   BMI 23.65 kg/m²   Physical Exam  Constitutional:       Appearance: He is well-developed. HENT:      Head: Normocephalic and atraumatic. Eyes:      General:         Right eye: No discharge. Left eye: No discharge. Conjunctiva/sclera: Conjunctivae normal.   Neck:      Trachea: No tracheal deviation. Cardiovascular:      Rate and Rhythm: Normal rate and regular rhythm. Heart sounds: Normal heart sounds. Pulmonary:      Effort: Pulmonary effort is normal. No respiratory distress. Breath sounds: Normal breath sounds. No wheezing. Abdominal:      General: Bowel sounds are normal. There is no distension. Palpations: Abdomen is soft. Tenderness: There is no abdominal tenderness. Musculoskeletal:         General: No tenderness. Cervical back: Normal range of motion and neck supple. Skin:     General: Skin is warm and dry. Neurological:      Mental Status: He is alert. Psychiatric:         Mood and Affect: Mood is depressed. Assessment and Plan       1. Lymphadenopathy of head and neck  F/u of chronic issue  -Worsening as is having increase in size and has noticed weight loss, however feels it is more due to his mood  -Has appropriate f/u with imaging and biopsy    2. Gastroesophageal reflux disease, unspecified whether esophagitis present  F/u of chronic issue  -Well controlled on prilosec, will continue same  - omeprazole (PRILOSEC) 40 MG delayed release capsule; Take 1 capsule by mouth every morning (before breakfast)  Dispense: 30 capsule; Refill: 3    3.  Bipolar 1 disorder, mixed, moderate ([address]  F/u of chronic issue  -Is feeling more depressed, however has appropriate follow up with counseling and psych, and denies any HI or SI  -Will continue to

## 2021-12-06 RX ORDER — OXCARBAZEPINE 600 MG/1
600 TABLET, FILM COATED ORAL NIGHTLY
COMMUNITY
End: 2022-03-14 | Stop reason: ALTCHOICE

## 2021-12-06 RX ORDER — OXCARBAZEPINE 300 MG/1
300 TABLET, FILM COATED ORAL EVERY MORNING
COMMUNITY
End: 2022-03-14 | Stop reason: ALTCHOICE

## 2021-12-06 NOTE — PROGRESS NOTES
Have you been tested for COVID  Yes           Have you been told you were positive for COVID No  Have you had any known exposure to someone that is positive for COVID No  Do you have a cough                   No              Do you have shortness of breath No                 Do you have a sore throat            No                Are you having chills                    No                Are you having muscle aches. No                    Please come to the hospital wearing a mask and have your significant other wear a mask as well. Both of you should check your temperature before leaving to come here,  if it is 100 or higher please call [phone] for instruction.

## 2021-12-06 NOTE — PROGRESS NOTES
Reviewed pt's use of cocaine and positive urine drug 9/2021 with Dr. Rosana Katz.  Order received urine drug screen day of surgery

## 2021-12-06 NOTE — PROGRESS NOTES
[facility] PRE-ADMISSION TESTING INSTRUCTIONS    The Preadmission Testing patient is instructed accordingly using the following criteria (check applicable):    ARRIVAL INSTRUCTIONS:  [x] Parking the day of Surgery is located in the Main Entrance lot. Upon entering the door, make an immediate right to the surgery reception desk    [x] Bring photo ID and insurance card    [] Bring in a copy of Living will or Durable Power of  papers. [x] Please be sure to arrange for responsible adult to provide transportation to and from the hospital    [x] Please arrange for responsible adult to be with you for the 24 hour period post procedure due to having anesthesia      GENERAL INSTRUCTIONS:    [x] Nothing by mouth after midnight, including gum, candy, mints or water    [x] You may brush your teeth, but do not swallow any water    [x] Take medications as instructed with 1-2 oz of water    [] Stop herbal supplements and vitamins 5 days prior to procedure    [] Follow preop dosing of blood thinners per physician instructions    [] Take 1/2 dose of evening insulin, but no insulin after midnight    [] No oral diabetic medications after midnight    [] If diabetic and have low blood sugar or feel symptomatic, take 1-2oz apple juice only    [] Bring inhalers day of surgery    [] Bring C-PAP/ Bi-Pap day of surgery    [] Bring urine specimen day of surgery    [x] Shower or bath with soap, lather and rinse well, AM of Surgery, no lotion, powders or creams to surgical site    [] Follow bowel prep as instructed per surgeon    [x] No tobacco products within 24 hours of surgery     [x] No alcohol or illegal drug use within 24 hours of surgery.     [x] Jewelry, body piercing's, eyeglasses, contact lenses and dentures are not permitted into surgery (bring cases)      [] Please do not wear any nail polish, make up or hair products on the day of surgery    [x] You can expect a call the business day prior to procedure to notify you if your arrival time changes    [x] If you receive a survey after surgery we would greatly appreciate your comments    [] Parent/guardian of a minor must accompany their child and remain on the premises  the entire time they are under our care     [] Pediatric patients may bring favorite toy, blanket or comfort item with them    [] A caregiver or family member must remain with the patient during their stay if they are mentally handicapped, have dementia, disoriented or unable to use a call light or would be a safety concern if left unattended    [x] Please notify surgeon if you develop any illness between now and time of surgery (cold, cough, sore throat, fever, nausea, vomiting) or any signs of infections  including skin, wounds, and dental.    [x]  The Outpatient Pharmacy is available to fill your prescription here on your day of surgery, ask your preop nurse for details    [] Other instructions    EDUCATIONAL MATERIALS PROVIDED:    [] PAT Preoperative Education Packet/Booklet     [] Medication List    [] Transfusion bracelet applied with instructions    [] Shower with soap, lather and rinse well, and use CHG wipes provided the evening before surgery as instructed    [] Incentive spirometer with instructions

## 2021-12-07 ENCOUNTER — ANESTHESIA (OUTPATIENT)
Dept: OPERATING ROOM | Age: 31
End: 2021-12-07

## 2021-12-07 ENCOUNTER — HOSPITAL ENCOUNTER (OUTPATIENT)
Age: 31
Setting detail: OUTPATIENT SURGERY
Discharge: HOME | End: 2021-12-07
Attending: SURGERY | Admitting: SURGERY
Payer: MEDICAID

## 2021-12-07 ENCOUNTER — ANESTHESIA EVENT (OUTPATIENT)
Dept: OPERATING ROOM | Age: 31
End: 2021-12-07

## 2021-12-07 VITALS
HEART RATE: 68 BPM | WEIGHT: 165 LBS | HEIGHT: 70 IN | DIASTOLIC BLOOD PRESSURE: 83 MMHG | OXYGEN SATURATION: 99 % | TEMPERATURE: 97.6 F | BODY MASS INDEX: 23.62 KG/M2 | RESPIRATION RATE: 18 BRPM | SYSTOLIC BLOOD PRESSURE: 129 MMHG

## 2021-12-07 VITALS — DIASTOLIC BLOOD PRESSURE: 68 MMHG | OXYGEN SATURATION: 99 % | SYSTOLIC BLOOD PRESSURE: 123 MMHG

## 2021-12-07 DIAGNOSIS — R59.1 LYMPHADENOPATHY: Primary | ICD-10-CM

## 2021-12-07 DIAGNOSIS — C73 THYROID CA (HCC): Primary | ICD-10-CM

## 2021-12-07 LAB
AMPHETAMINE SCREEN, URINE: POSITIVE
BARBITURATE SCREEN URINE: NOT DETECTED
BENZODIAZEPINE SCREEN, URINE: NOT DETECTED
CANNABINOID SCREEN URINE: POSITIVE
COCAINE METABOLITE SCREEN URINE: NOT DETECTED
FENTANYL SCREEN, URINE: NOT DETECTED
Lab: ABNORMAL
METHADONE SCREEN, URINE: NOT DETECTED
OPIATE SCREEN URINE: NOT DETECTED
OXYCODONE URINE: NOT DETECTED
PHENCYCLIDINE SCREEN URINE: NOT DETECTED

## 2021-12-07 PROCEDURE — 80307 DRUG TEST PRSMV CHEM ANLYZR: CPT

## 2021-12-07 PROCEDURE — 88305 TISSUE EXAM BY PATHOLOGIST: CPT

## 2021-12-07 PROCEDURE — 88342 IMHCHEM/IMCYTCHM 1ST ANTB: CPT

## 2021-12-07 PROCEDURE — 2500000003 HC RX 250 WO HCPCS: Performed by: NURSE ANESTHETIST, CERTIFIED REGISTERED

## 2021-12-07 PROCEDURE — 7100000010 HC PHASE II RECOVERY - FIRST 15 MIN: Performed by: SURGERY

## 2021-12-07 PROCEDURE — 38510 BIOPSY/REMOVAL LYMPH NODES: CPT | Performed by: SURGERY

## 2021-12-07 PROCEDURE — 2580000003 HC RX 258: Performed by: SURGERY

## 2021-12-07 PROCEDURE — 2500000003 HC RX 250 WO HCPCS: Performed by: SURGERY

## 2021-12-07 PROCEDURE — 3600000012 HC SURGERY LEVEL 2 ADDTL 15MIN: Performed by: SURGERY

## 2021-12-07 PROCEDURE — 6360000002 HC RX W HCPCS: Performed by: NURSE ANESTHETIST, CERTIFIED REGISTERED

## 2021-12-07 PROCEDURE — 6360000002 HC RX W HCPCS: Performed by: SURGERY

## 2021-12-07 PROCEDURE — 3600000002 HC SURGERY LEVEL 2 BASE: Performed by: SURGERY

## 2021-12-07 PROCEDURE — 88331 PATH CONSLTJ SURG 1 BLK 1SPC: CPT

## 2021-12-07 PROCEDURE — 3700000001 HC ADD 15 MINUTES (ANESTHESIA): Performed by: SURGERY

## 2021-12-07 PROCEDURE — 3700000000 HC ANESTHESIA ATTENDED CARE: Performed by: SURGERY

## 2021-12-07 PROCEDURE — 7100000011 HC PHASE II RECOVERY - ADDTL 15 MIN: Performed by: SURGERY

## 2021-12-07 PROCEDURE — 2709999900 HC NON-CHARGEABLE SUPPLY: Performed by: SURGERY

## 2021-12-07 RX ORDER — FENTANYL CITRATE 50 UG/ML
INJECTION, SOLUTION INTRAMUSCULAR; INTRAVENOUS PRN
Status: DISCONTINUED | OUTPATIENT
Start: 2021-12-07 | End: 2021-12-07 | Stop reason: SDUPTHER

## 2021-12-07 RX ORDER — SODIUM CHLORIDE 0.9 % (FLUSH) 0.9 %
10 SYRINGE (ML) INJECTION PRN
Status: DISCONTINUED | OUTPATIENT
Start: 2021-12-07 | End: 2021-12-07 | Stop reason: HOSPADM

## 2021-12-07 RX ORDER — FENTANYL CITRATE 50 UG/ML
25 INJECTION, SOLUTION INTRAMUSCULAR; INTRAVENOUS EVERY 5 MIN PRN
Status: DISCONTINUED | OUTPATIENT
Start: 2021-12-07 | End: 2021-12-07 | Stop reason: HOSPADM

## 2021-12-07 RX ORDER — PROPOFOL 10 MG/ML
INJECTION, EMULSION INTRAVENOUS PRN
Status: DISCONTINUED | OUTPATIENT
Start: 2021-12-07 | End: 2021-12-07 | Stop reason: SDUPTHER

## 2021-12-07 RX ORDER — OXYCODONE HYDROCHLORIDE AND ACETAMINOPHEN 5; 325 MG/1; MG/1
1 TABLET ORAL PRN
Status: DISCONTINUED | OUTPATIENT
Start: 2021-12-07 | End: 2021-12-07 | Stop reason: HOSPADM

## 2021-12-07 RX ORDER — MEPERIDINE HYDROCHLORIDE 25 MG/ML
12.5 INJECTION INTRAMUSCULAR; INTRAVENOUS; SUBCUTANEOUS EVERY 10 MIN PRN
Status: DISCONTINUED | OUTPATIENT
Start: 2021-12-07 | End: 2021-12-07 | Stop reason: HOSPADM

## 2021-12-07 RX ORDER — SODIUM CHLORIDE 0.9 % (FLUSH) 0.9 %
10 SYRINGE (ML) INJECTION EVERY 12 HOURS SCHEDULED
Status: DISCONTINUED | OUTPATIENT
Start: 2021-12-07 | End: 2021-12-07 | Stop reason: HOSPADM

## 2021-12-07 RX ORDER — HYDROCODONE BITARTRATE AND ACETAMINOPHEN 5; 325 MG/1; MG/1
1 TABLET ORAL EVERY 6 HOURS PRN
Qty: 10 TABLET | Refills: 0 | Status: SHIPPED | OUTPATIENT
Start: 2021-12-07 | End: 2021-12-09

## 2021-12-07 RX ORDER — ACETAMINOPHEN 500 MG
500 TABLET ORAL ONCE
Status: DISCONTINUED | OUTPATIENT
Start: 2021-12-07 | End: 2021-12-07 | Stop reason: HOSPADM

## 2021-12-07 RX ORDER — LIDOCAINE HYDROCHLORIDE 20 MG/ML
INJECTION, SOLUTION EPIDURAL; INFILTRATION; INTRACAUDAL; PERINEURAL PRN
Status: DISCONTINUED | OUTPATIENT
Start: 2021-12-07 | End: 2021-12-07 | Stop reason: SDUPTHER

## 2021-12-07 RX ORDER — DIPHENHYDRAMINE HYDROCHLORIDE 50 MG/ML
12.5 INJECTION INTRAMUSCULAR; INTRAVENOUS
Status: DISCONTINUED | OUTPATIENT
Start: 2021-12-07 | End: 2021-12-07 | Stop reason: HOSPADM

## 2021-12-07 RX ORDER — MIDAZOLAM HYDROCHLORIDE 1 MG/ML
INJECTION INTRAMUSCULAR; INTRAVENOUS PRN
Status: DISCONTINUED | OUTPATIENT
Start: 2021-12-07 | End: 2021-12-07 | Stop reason: SDUPTHER

## 2021-12-07 RX ORDER — SODIUM CHLORIDE 9 MG/ML
INJECTION, SOLUTION INTRAVENOUS CONTINUOUS
Status: DISCONTINUED | OUTPATIENT
Start: 2021-12-07 | End: 2021-12-07 | Stop reason: HOSPADM

## 2021-12-07 RX ORDER — KETAMINE HYDROCHLORIDE 10 MG/ML
INJECTION, SOLUTION INTRAMUSCULAR; INTRAVENOUS PRN
Status: DISCONTINUED | OUTPATIENT
Start: 2021-12-07 | End: 2021-12-07 | Stop reason: SDUPTHER

## 2021-12-07 RX ORDER — PROMETHAZINE HYDROCHLORIDE 25 MG/ML
6.25 INJECTION, SOLUTION INTRAMUSCULAR; INTRAVENOUS PRN
Status: DISCONTINUED | OUTPATIENT
Start: 2021-12-07 | End: 2021-12-07 | Stop reason: HOSPADM

## 2021-12-07 RX ORDER — SODIUM CHLORIDE 9 MG/ML
25 INJECTION, SOLUTION INTRAVENOUS PRN
Status: DISCONTINUED | OUTPATIENT
Start: 2021-12-07 | End: 2021-12-07 | Stop reason: HOSPADM

## 2021-12-07 RX ORDER — LIDOCAINE HYDROCHLORIDE AND EPINEPHRINE 10; 10 MG/ML; UG/ML
INJECTION, SOLUTION INFILTRATION; PERINEURAL PRN
Status: DISCONTINUED | OUTPATIENT
Start: 2021-12-07 | End: 2021-12-07 | Stop reason: ALTCHOICE

## 2021-12-07 RX ADMIN — LIDOCAINE HYDROCHLORIDE 100 MG: 20 INJECTION, SOLUTION EPIDURAL; INFILTRATION; INTRACAUDAL; PERINEURAL at 13:18

## 2021-12-07 RX ADMIN — MIDAZOLAM 2 MG: 1 INJECTION INTRAMUSCULAR; INTRAVENOUS at 13:11

## 2021-12-07 RX ADMIN — SODIUM CHLORIDE: 9 INJECTION, SOLUTION INTRAVENOUS at 13:14

## 2021-12-07 RX ADMIN — PROPOFOL 100 MCG/KG/MIN: 10 INJECTION, EMULSION INTRAVENOUS at 13:20

## 2021-12-07 RX ADMIN — PROPOFOL 50 MG: 10 INJECTION, EMULSION INTRAVENOUS at 13:19

## 2021-12-07 RX ADMIN — FENTANYL CITRATE 100 MCG: 50 INJECTION, SOLUTION INTRAMUSCULAR; INTRAVENOUS at 13:17

## 2021-12-07 RX ADMIN — PROPOFOL 30 MG: 10 INJECTION, EMULSION INTRAVENOUS at 13:31

## 2021-12-07 RX ADMIN — Medication 2000 MG: at 13:14

## 2021-12-07 RX ADMIN — KETAMINE HYDROCHLORIDE 20 MG: 10 INJECTION INTRAMUSCULAR; INTRAVENOUS at 13:19

## 2021-12-07 ASSESSMENT — PULMONARY FUNCTION TESTS
PIF_VALUE: 0
PIF_VALUE: 1
PIF_VALUE: 0
PIF_VALUE: 1
PIF_VALUE: 0
PIF_VALUE: 0
PIF_VALUE: 2
PIF_VALUE: 1
PIF_VALUE: 1
PIF_VALUE: 0
PIF_VALUE: 14
PIF_VALUE: 12
PIF_VALUE: 0
PIF_VALUE: 0
PIF_VALUE: 1
PIF_VALUE: 0
PIF_VALUE: 1
PIF_VALUE: 0
PIF_VALUE: 1
PIF_VALUE: 0
PIF_VALUE: 0
PIF_VALUE: 12
PIF_VALUE: 1
PIF_VALUE: 0
PIF_VALUE: 0
PIF_VALUE: 14
PIF_VALUE: 1
PIF_VALUE: 0
PIF_VALUE: 1
PIF_VALUE: 0

## 2021-12-07 ASSESSMENT — LIFESTYLE VARIABLES: SMOKING_STATUS: 0

## 2021-12-07 ASSESSMENT — PAIN SCALES - GENERAL
PAINLEVEL_OUTOF10: 0

## 2021-12-07 NOTE — PROGRESS NOTES
INTRAOPERATIVE CONSULTATION (with FROZEN SECTION)    Right cervical lymph node - papillary thyroid carcinoma

## 2021-12-07 NOTE — BRIEF OP NOTE
Brief Postoperative Note      Patient: Rosalee Beltre  YOB: 1990  MRN: [id number]    Date of Procedure: 12/7/2021    Pre-Op Diagnosis: LYMPHADENOPATHY    Post-Op Diagnosis: Same       Procedure(s):  EXCISIONAL BIOPSY OF RIGHT CERVICAL LYMPH NODE    Surgeon(s):  Mar Herbert DO    Assistant:  Resident: Jackie Epps MD    Anesthesia: General    Estimated Blood Loss (mL): Minimal    Complications: None    Specimens:   ID Type Source Tests Collected by Time Destination   A : RIGHT CERVICAL LYMPH NODE Tissue Tissue SURGICAL PATHOLOGY Mar Herbert DO 12/7/2021 1327        Implants:  * No implants in log *      Drains: * No LDAs found *    Findings: excision of right deep cervical lymph node      Electronically signed by Betzy Alexis MD on 12/7/2021 at 1:46 PM

## 2021-12-07 NOTE — ANESTHESIA POSTPROCEDURE EVALUATION
Department of Anesthesiology  Postprocedure Note    Patient: Reyes Ortiz  MRN: [id number]  YOB: 1990  Date of evaluation: 12/7/2021  Time:  1:56 PM     Procedure Summary     Date: 12/07/21 Room / Location: [facility] OR 09 / [facility]    Anesthesia Start: 7985 Anesthesia Stop: 3695    Procedure: EXCISIONAL BIOPSY OF RIGHT CERVICAL LYMPH NODE (Right ) Diagnosis: (LYMPHADENOPATHY)    Surgeons: Minal White DO Responsible Provider: Myrna Greenfield MD    Anesthesia Type: general, MAC ASA Status: 2          Anesthesia Type: general, MAC    Shanice Phase I: Shanice Score: 10    Shanice Phase II:      Last vitals: Reviewed and per EMR flowsheets.        Anesthesia Post Evaluation    Patient location during evaluation: PACU  Patient participation: complete - patient participated  Level of consciousness: awake and alert  Airway patency: patent  Nausea & Vomiting: no nausea and no vomiting  Complications: no  Cardiovascular status: blood pressure returned to baseline  Respiratory status: acceptable  Hydration status: euvolemic

## 2021-12-07 NOTE — ANESTHESIA PRE PROCEDURE
Department of Anesthesiology  Preprocedure Note       Name:  Ernestine Lundberg   Age:  33 y.o.  :  1990                                          MRN:  [id number]         Date:  2021      Surgeon: Zeenat Machado):  Dana Paz DO    Procedure: Procedure(s):  EXCISIONAL BIOPSY OF RIGHT CERVICAL LYMPH NODE    Medications prior to admission:   Prior to Admission medications    Medication Sig Start Date End Date Taking? Authorizing Provider   OXcarbazepine (TRILEPTAL) 300 MG tablet Take 300 mg by mouth every morning   Yes Historical Provider, MD   OXcarbazepine (TRILEPTAL) 600 MG tablet Take 600 mg by mouth nightly   Yes Historical Provider, MD   omeprazole (PRILOSEC) 40 MG delayed release capsule Take 1 capsule by mouth every morning (before breakfast) 21  Yes Greg Weiss DO   ARIPiprazole (ABILIFY) 10 MG tablet Take 15 mg by mouth daily  21  Yes Historical Provider, MD       Current medications:    Current Facility-Administered Medications   Medication Dose Route Frequency Provider Last Rate Last Admin    0.9 % sodium chloride infusion   IntraVENous Continuous Danafaiza Paz, DO        0.9 % sodium chloride infusion  25 mL IntraVENous PRN Dana Paz DO        ceFAZolin (ANCEF) 2000 mg in sterile water 20 mL IV syringe  2,000 mg IntraVENous On Call to [facility], DO        sodium chloride flush 0.9 % injection 10 mL  10 mL IntraVENous 2 times per day Dana Jackson, DO        sodium chloride flush 0.9 % injection 10 mL  10 mL IntraVENous PRN Dana Paz DO           Allergies:  No Known Allergies    Problem List:    Patient Active Problem List   Diagnosis Code    Depression, acute F32. A    Severe episode of recurrent major depressive disorder, without psychotic features ([facility] [address] F33.2    Major depressive disorder, recurrent, severe without psychotic features ([facility] [address] F33.2    MDD (major depressive disorder), recurrent, severe, with psychosis ([facility] [address] F33.3    Bipolar 1 disorder, mixed, moderate ([facility] F31.62    Polysubstance abuse ([facility]) F19.10    Lymphadenopathy of head and neck R59.1    Gastroesophageal reflux disease K21.9    Skin tags, multiple acquired L91.8       Past Medical History:        Diagnosis Date    Enlarged lymph node     Moderate episode of recurrent major depressive disorder ([facility] [address]        Past Surgical History:        Procedure Laterality Date    FRACTURE SURGERY      left arm age 7        Social History:    Social History     Tobacco Use    Smoking status: Former Smoker     Packs/day: 1.00     Types: Cigarettes, E-Cigarettes    Smokeless tobacco: Never Used   Substance Use Topics    Alcohol use: Not Currently                                Counseling given: Not Answered      Vital Signs (Current):   Vitals:    12/06/21 0943 12/07/21 0842   BP:  137/88   Pulse:  76   Resp:  16   Temp:  97.6 °F (36.4 °C)   TempSrc:  Temporal   SpO2:  97%   Weight: 165 lb (74.8 kg) 165 lb (74.8 kg)   Height: 5' 10\" (1.778 m) 5' 10\" (1.778 m)                                              BP Readings from Last 3 Encounters:   12/07/21 137/88   11/29/21 110/70   11/10/21 109/66       NPO Status: Time of last liquid consumption: 0700                        Time of last solid consumption: 1700                        Date of last liquid consumption: 12/07/21                        Date of last solid food consumption: 12/06/21    BMI:   Wt Readings from Last 3 Encounters:   12/07/21 165 lb (74.8 kg)   11/29/21 164 lb 12.8 oz (74.8 kg)   11/10/21 175 lb (79.4 kg)     Body mass index is 23.68 kg/m².     CBC:   Lab Results   Component Value Date    WBC 10.4 09/03/2021    RBC 5.40 09/03/2021    HGB 15.3 09/03/2021    HCT 45.9 09/03/2021    MCV 85.0 09/03/2021    RDW 13.4 09/03/2021     09/03/2021       CMP:   Lab Results   Component Value Date     09/03/2021    K 4.1 09/03/2021    K 3.9 06/12/2021     09/03/2021    CO2 27 09/03/2021    BUN 16 09/03/2021    CREATININE 1.0 09/03/2021    GFRAA >60 09/03/2021    LABGLOM >60 09/03/2021    GLUCOSE 93 09/03/2021    PROT 7.2 09/03/2021    CALCIUM 9.3 09/03/2021    BILITOT <0.2 09/03/2021    ALKPHOS 61 09/03/2021    AST 14 09/03/2021    ALT 14 09/03/2021       POC Tests: No results for input(s): POCGLU, POCNA, POCK, POCCL, POCBUN, POCHEMO, POCHCT in the last 72 hours. Coags: No results found for: PROTIME, INR, APTT    HCG (If Applicable): No results found for: PREGTESTUR, PREGSERUM, HCG, HCGQUANT     ABGs: No results found for: PHART, PO2ART, FTS5VKO, EUD8MJN, BEART, Q5QHEAGY     Type & Screen (If Applicable):  No results found for: LABABO, LABRH    Drug/Infectious Status (If Applicable):  No results found for: HIV, HEPCAB    COVID-19 Screening (If Applicable):   Lab Results   Component Value Date    COVID19 NOT DETECTED 09/03/2021           Anesthesia Evaluation  Patient summary reviewed and Nursing notes reviewed no history of anesthetic complications:   Airway: Mallampati: I  TM distance: >3 FB   Neck ROM: full  Mouth opening: > = 3 FB Dental:          Pulmonary:Negative Pulmonary ROS breath sounds clear to auscultation      (-) not a current smoker          Patient did not smoke on day of surgery. Cardiovascular:Negative CV ROS  Exercise tolerance: good (>4 METS),           Rhythm: regular  Rate: normal           Beta Blocker:  Not on Beta Blocker         Neuro/Psych:   (+) psychiatric history:depression/anxiety             GI/Hepatic/Renal:   (+) GERD: well controlled,           Endo/Other:                      ROS comment: R lympadeopathy Abdominal:             Vascular: negative vascular ROS. Other Findings:             Anesthesia Plan      general and MAC     ASA 2       Induction: intravenous. Anesthetic plan and risks discussed with patient and mother (pt and Mom aware of amphetamine risk and wish to proceed; he denies anything other than smoking pot).                     Mee Dodge MD 12/7/2021    Reviewed and agree with plam Cherylene Hooks, APRN - ELISHA

## 2021-12-07 NOTE — PROGRESS NOTES
CLINICAL PHARMACY NOTE: MEDS TO BEDS    Total # of Prescriptions Filled: 1   The following medications were delivered to the patient:  · norco 5-325 mg    Additional Documentation:

## 2021-12-07 NOTE — INTERVAL H&P NOTE
Update History & Physical    The patient's History and Physical was reviewed with the patient and I examined the patient. There was no change. The surgical site was confirmed by the patient and me. Plan: The risks, benefits, expected outcome, and alternative to the recommended procedure have been discussed with the patient. Patient understands and wants to proceed with the procedure.      Electronically signed by Justin Hebert DO on 12/7/2021 at 1:09 PM

## 2021-12-08 ENCOUNTER — OFFICE VISIT (OUTPATIENT)
Dept: SURGERY | Age: 31
End: 2021-12-08

## 2021-12-08 VITALS
DIASTOLIC BLOOD PRESSURE: 97 MMHG | TEMPERATURE: 97.5 F | OXYGEN SATURATION: 98 % | RESPIRATION RATE: 18 BRPM | HEIGHT: 70 IN | SYSTOLIC BLOOD PRESSURE: 120 MMHG | BODY MASS INDEX: 23.71 KG/M2 | HEART RATE: 105 BPM | WEIGHT: 165.6 LBS

## 2021-12-08 DIAGNOSIS — R59.1 LYMPHADENOPATHY: Primary | ICD-10-CM

## 2021-12-08 DIAGNOSIS — C73 THYROID CA (HCC): ICD-10-CM

## 2021-12-08 PROCEDURE — 99024 POSTOP FOLLOW-UP VISIT: CPT | Performed by: SURGERY

## 2021-12-13 NOTE — OP NOTE
Operative Note      Patient: Sammie Roth  YOB: 1990  MRN: [id number]    Date of Procedure: 12/7/2021    Pre-Op Diagnosis: LYMPHADENOPATHY    Post-Op Diagnosis: Same       Procedure(s):  EXCISIONAL BIOPSY OF DEEP RIGHT CERVICAL LYMPH NODE    Surgeon(s):  Fred Bravo DO    Assistant:   Resident: Modesto Obrien MD    Anesthesia: General    Estimated Blood Loss (mL): Minimal    Complications: None    Specimens:   ID Type Source Tests Collected by Time Destination   A : RIGHT CERVICAL LYMPH NODE Tissue Tissue SURGICAL PATHOLOGY Fred Bravo DO 12/7/2021 1327        Implants:  * No implants in log *      Drains: * No LDAs found *    Findings: Enlarged deep right cervical lymph node    Detailed Description of Procedure:   Procedures from the operating room under MAC anesthesia. Patient prepped draped standard sterile fashion. Lidocaine 1% with epinephrine was ministered Whitney operative note for local anesthetic. Following administration of local, curvilinear incision was made in the patient's right lateral neck overlying the area of palpable lymphadenopathy. Soft tissue and platysma are incised with cautery. The sternocleidomastoid muscles are split along the direction of their fibers. We entered the space posterior to the sternocleidomastoid identifying the lymph node. Lymph node was bluntly dissected as well as with the aid of sharp dissection with Metzenbaum scissors. Afferent and need for lymphatics were controlled with clips. Node was then removed and sent to pathology. Wound was irrigated. Cautery was used for hemostasis were necessary. Fascia was closed with interrupted 3-0 Monocryl, platysma reapproximated 3-0 Monocryl and skin closed with 3-0 Monocryl and 4 Monocryl subcuticular suture followed by layer skin glue. At the end the procedure all sponge, needle, trochanter correct.     Electronically signed by Fred Bravo DO on 12/13/2021 at 12:39 PM

## 2022-01-18 DIAGNOSIS — C73 PAPILLARY THYROID CARCINOMA (HCC): Primary | ICD-10-CM

## 2022-01-20 ENCOUNTER — HOSPITAL ENCOUNTER (OUTPATIENT)
Dept: ULTRASOUND IMAGING | Age: 32
Discharge: HOME | End: 2022-01-22

## 2022-01-20 VITALS
WEIGHT: 170 LBS | HEIGHT: 70 IN | RESPIRATION RATE: 18 BRPM | OXYGEN SATURATION: 95 % | BODY MASS INDEX: 24.34 KG/M2 | TEMPERATURE: 98.1 F | HEART RATE: 72 BPM | DIASTOLIC BLOOD PRESSURE: 62 MMHG | SYSTOLIC BLOOD PRESSURE: 117 MMHG

## 2022-01-20 DIAGNOSIS — C73 THYROID CA (HCC): ICD-10-CM

## 2022-01-20 DIAGNOSIS — C73 PAPILLARY THYROID CARCINOMA (HCC): ICD-10-CM

## 2022-01-20 PROCEDURE — 10005 FNA BX W/US GDN 1ST LES: CPT

## 2022-01-20 PROCEDURE — 88173 CYTOPATH EVAL FNA REPORT: CPT

## 2022-01-20 PROCEDURE — 76536 US EXAM OF HEAD AND NECK: CPT

## 2022-01-20 PROCEDURE — 88305 TISSUE EXAM BY PATHOLOGIST: CPT

## 2022-01-20 ASSESSMENT — PAIN - FUNCTIONAL ASSESSMENT
PAIN_FUNCTIONAL_ASSESSMENT: 0-10
PAIN_FUNCTIONAL_ASSESSMENT: 0-10

## 2022-01-26 ENCOUNTER — OFFICE VISIT (OUTPATIENT)
Dept: SURGERY | Age: 32
End: 2022-01-26
Payer: MEDICAID

## 2022-01-26 ENCOUNTER — TELEPHONE (OUTPATIENT)
Dept: SURGERY | Age: 32
End: 2022-01-26

## 2022-01-26 VITALS
BODY MASS INDEX: 23.19 KG/M2 | TEMPERATURE: 97 F | WEIGHT: 162 LBS | HEART RATE: 72 BPM | SYSTOLIC BLOOD PRESSURE: 135 MMHG | DIASTOLIC BLOOD PRESSURE: 79 MMHG | RESPIRATION RATE: 16 BRPM | HEIGHT: 70 IN

## 2022-01-26 DIAGNOSIS — R59.1 LYMPHADENOPATHY: ICD-10-CM

## 2022-01-26 DIAGNOSIS — C73 PAPILLARY THYROID CARCINOMA (HCC): Primary | ICD-10-CM

## 2022-01-26 PROCEDURE — 99214 OFFICE O/P EST MOD 30 MIN: CPT | Performed by: SURGERY

## 2022-01-26 RX ORDER — OLANZAPINE 15 MG/1
15 TABLET ORAL NIGHTLY
COMMUNITY
End: 2022-02-03

## 2022-01-26 NOTE — TELEPHONE ENCOUNTER
Prior Authorization Form:      DEMOGRAPHICS:                     Patient Name:  Chana Beltran  Patient :  1990            Insurance:  Payor: / No coverage found. Insurance ID Number:    Payor/Plan Subscr  Sex Relation Sub.  Ins. ID Effective Group Num         DIAGNOSIS & PROCEDURE:                       Procedure/Operation: FLEXIBLE LARYGOSCOPY THYROIDECTOMY           CPT Code: 28487    Diagnosis:  THYROID CA    ICD10 Code: S31    Location:  [place]    Surgeon:  DR. Christian Garcia INFORMATION:                          Date: 2022    Time: 1:00              Anesthesia:  General                                                       Status:  Outpatient        Special Comments:         Electronically signed by Belkys Jean MA on 2022 at 3:29 PM

## 2022-01-28 RX ORDER — SODIUM CHLORIDE 9 MG/ML
25 INJECTION, SOLUTION INTRAVENOUS PRN
Status: CANCELLED | OUTPATIENT
Start: 2022-01-28

## 2022-01-28 RX ORDER — SODIUM CHLORIDE 9 MG/ML
INJECTION, SOLUTION INTRAVENOUS CONTINUOUS
Status: CANCELLED | OUTPATIENT
Start: 2022-01-28

## 2022-01-28 RX ORDER — SODIUM CHLORIDE 0.9 % (FLUSH) 0.9 %
10 SYRINGE (ML) INJECTION EVERY 12 HOURS SCHEDULED
Status: CANCELLED | OUTPATIENT
Start: 2022-01-28

## 2022-01-28 RX ORDER — SODIUM CHLORIDE 0.9 % (FLUSH) 0.9 %
10 SYRINGE (ML) INJECTION PRN
Status: CANCELLED | OUTPATIENT
Start: 2022-01-28

## 2022-01-28 ASSESSMENT — ENCOUNTER SYMPTOMS
COUGH: 0
EYE REDNESS: 0
SHORTNESS OF BREATH: 0
BACK PAIN: 0
CONSTIPATION: 0
WHEEZING: 0
SORE THROAT: 0
VOMITING: 0
ABDOMINAL PAIN: 0
PHOTOPHOBIA: 0
DIARRHEA: 0
NAUSEA: 0
BLOOD IN STOOL: 0

## 2022-01-28 NOTE — PROGRESS NOTES
[facility]  Department of Family Medicine      Patient:  Tu Delgado 33 y.o. male     Date of Service: 1/28/22      Chief complaint:   Chief Complaint   Patient presents with    Depression         History ofPresent Illness   The patient is a 33 y.o. male  presented to the clinic with complaints as above. LAD  -f/u  -had cervical lymph node biopsy done which showed papillary thyroid carcinoma  -currently, feeling ok, having some fatigue   -has thyroidectomy scheduled for February 8th  -denies any weight loss   -mood has been ok       Past Medical History:      Diagnosis Date    Enlarged lymph node     Moderate episode of recurrent major depressive disorder ([address]        PastSurgical History:        Procedure Laterality Date    AXILLARY SURGERY Right 12/7/2021    EXCISIONAL BIOPSY OF RIGHT CERVICAL LYMPH NODE performed by Lauren Spangler DO at [address] [facility]      left arm age 7        Allergies:    Patient has no known allergies.     Social History:   Social History     Socioeconomic History    Marital status: Single     Spouse name: Not on file    Number of children: 11    Years of education: 11th    Highest education level: Not on file   Occupational History    Not on file   Tobacco Use    Smoking status: Former Smoker     Packs/day: 1.00     Types: Cigarettes, E-Cigarettes    Smokeless tobacco: Never Used   Vaping Use    Vaping Use: Former   Substance and Sexual Activity    Alcohol use: Not Currently    Drug use: Yes     Frequency: 80.0 times per week     Types: Marijuana ([place]), Cocaine     Comment: last use 4 months ago, 9/2021    Sexual activity: Yes     Partners: Female   Other Topics Concern    Not on file   Social History Narrative    Not on file     Social Determinants of Health     Financial Resource Strain:     Difficulty of Paying Living Expenses: Not on file   Food Insecurity:     Worried About Running Out of Food in the Last Year: Not on file    [address] in the Last Year: Not on file   Transportation Needs:     Lack of Transportation (Medical): Not on file    Lack of Transportation (Non-Medical): Not on file   Physical Activity:     Days of Exercise per Week: Not on file    Minutes of Exercise per Session: Not on file   Stress:     Feeling of Stress : Not on file   Social Connections:     Frequency of Communication with Friends and Family: Not on file    Frequency of Social Gatherings with Friends and Family: Not on file    Attends Spiritism Services: Not on file    Active Member of [address] [place] [address] [employer] or Organizations: Not on file    Attends Club or Organization Meetings: Not on file    Marital Status: Not on file   Intimate Partner Violence:     Fear of Current or Ex-Partner: Not on file    Emotionally Abused: Not on file    Physically Abused: Not on file    Sexually Abused: Not on file   Housing Stability:     Unable to Pay for Housing in the Last Year: Not on file    Number of [place] in the Last Year: Not on file    Unstable Housing in the Last Year: Not on file        Family History:       Problem Relation Age of Onset    Other Father        Review of Systems:   Review of Systems - as above     Physical Exam   Vitals: /70   Pulse 79   Temp 97.1 °F (36.2 °C) (Infrared)   Resp 18   Ht 5' 10\" (1.778 m)   Wt 165 lb (74.8 kg)   SpO2 98%   BMI 23.68 kg/m²   Physical Exam  Constitutional:       Appearance: He is well-developed. HENT:      Head: Normocephalic and atraumatic. Eyes:      General:         Right eye: No discharge. Left eye: No discharge. Conjunctiva/sclera: Conjunctivae normal.   Neck:      Trachea: No tracheal deviation. Cardiovascular:      Rate and Rhythm: Normal rate and regular rhythm. Heart sounds: Normal heart sounds. Pulmonary:      Effort: Pulmonary effort is normal. No respiratory distress. Breath sounds: Normal breath sounds. No wheezing.    Abdominal:      General: Bowel sounds are normal. There is no distension. Palpations: Abdomen is soft. Tenderness: There is no abdominal tenderness. Musculoskeletal:         General: No tenderness. Cervical back: Normal range of motion and neck supple. Skin:     General: Skin is warm and dry. Neurological:      Mental Status: He is alert. Psychiatric:         Behavior: Behavior normal.             Assessment and Plan       1. Papillary thyroid carcinoma ([facility]  F/u  -Has appropriate follow up, is getting thyroidectomy and lymph node excision by general surgery in early February, will discuss further imaging with them if needed (to check for mets?)    2. Major depressive disorder, recurrent, severe without psychotic features ([facility] [address]  F/u  -Stable, continue same medication and following with psych       Counseled regarding above diagnosis, including possible risks and complications,  especially if left uncontrolled. Counseled regarding the possible side effects, risks, benefits and alternatives to treatment;patient and/or guardian verbalizes understanding, agrees, feels comfortable with and wishes to proceed with above treatment plan. Call or go to [address] if symptoms worsen or persist. Advised patient to call with any new medication issues, and, as applicable, read all Rx info from pharmacy to assure aware of all possible risks and side effects of medicationbefore taking. Patient and/or guardian given opportunity to ask questions/raise concerns. The patient verbalized comfort and understanding ofinstructions. I encourage further reading and education about your health conditions. Information on many health conditions is provided by [facility] [employer]: [email or web address]  Please bring any questions to me at your nextvisit. Return to Office: Return if symptoms worsen or fail to improve.     Medication List:    Current Outpatient Medications   Medication Sig Dispense Refill  OLANZapine (ZYPREXA) 15 MG tablet Take 15 mg by mouth nightly      OXcarbazepine (TRILEPTAL) 300 MG tablet Take 300 mg by mouth every morning      OXcarbazepine (TRILEPTAL) 600 MG tablet Take 600 mg by mouth nightly      omeprazole (PRILOSEC) 40 MG delayed release capsule Take 1 capsule by mouth every morning (before breakfast) 30 capsule 3    ARIPiprazole (ABILIFY) 10 MG tablet Take 15 mg by mouth daily        No current facility-administered medications for this visit. Mehnaz Manley DO       This document may have been prepared at least partially through the use of voice recognition software. Although effort is taken to assure the accuracy ofthis document, it is possible that grammatical, syntax,  or spelling errors may occur.

## 2022-01-28 NOTE — PROGRESS NOTES
History and Physical    Patient's Name/Date of Birth: Jay Lindsey / 1990 (83 y.o.)    Date: January 28, 2022     Chief Complaint:   Chief Complaint   Patient presents with    Results     thyroid biopsy        HPI: Patient is a 43-year-old male with papillary thyroid carcinoma. He originally presented with complaint of 3-year history of swollen lymph nodes in his right neck. Excisional biopsy revealed this to be papillary thyroid carcinoma. He has subsequently underwent ultrasound with biopsy of right thyroid mass. This also came back as papillary thyroid carcinoma. He has completed CT of the chest as well as neck. Past Medical History:   Diagnosis Date    Enlarged lymph node     Moderate episode of recurrent major depressive disorder [facility])        Past Surgical History:   Procedure Laterality Date    AXILLARY SURGERY Right 12/7/2021    EXCISIONAL BIOPSY OF RIGHT CERVICAL LYMPH NODE performed by Leigh Tse DO at [facility]      left arm age 7        Prior to Admission medications    Medication Sig Start Date End Date Taking?  Authorizing Provider   OLANZapine (ZYPREXA) 15 MG tablet Take 15 mg by mouth nightly   Yes Historical Provider, MD   OXcarbazepine (TRILEPTAL) 300 MG tablet Take 300 mg by mouth every morning   Yes Historical Provider, MD   OXcarbazepine (TRILEPTAL) 600 MG tablet Take 600 mg by mouth nightly   Yes Historical Provider, MD   omeprazole (PRILOSEC) 40 MG delayed release capsule Take 1 capsule by mouth every morning (before breakfast) 11/29/21  Yes Cielo Weiss DO   ARIPiprazole (ABILIFY) 10 MG tablet Take 15 mg by mouth daily  11/8/21  Yes Historical Provider, MD       No Known Allergies    Family History   Problem Relation Age of Onset    Other Father           Social History     Socioeconomic History    Marital status: Single     Spouse name: Not on file    Number of children: 11    Years of education: 11th    Highest education level: Not on file Occupational History    Not on file   Tobacco Use    Smoking status: Former Smoker     Packs/day: 1.00     Types: Cigarettes, E-Cigarettes    Smokeless tobacco: Never Used   Vaping Use    Vaping Use: Former   Substance and Sexual Activity    Alcohol use: Not Currently    Drug use: Yes     Frequency: 80.0 times per week     Types: Marijuana ([place]), Cocaine     Comment: last use 4 months ago, 9/2021    Sexual activity: Yes     Partners: Female   Other Topics Concern    Not on file   Social History Narrative    Not on file     Social Determinants of Health     Financial Resource Strain:     Difficulty of Paying Living Expenses: Not on file   Food Insecurity:     Worried About Running Out of Food in the Last Year: Not on file    Meg of Food in the Last Year: Not on file   Transportation Needs:     Lack of Transportation (Medical): Not on file    Lack of Transportation (Non-Medical): Not on file   Physical Activity:     Days of Exercise per Week: Not on file    Minutes of Exercise per Session: Not on file   Stress:     Feeling of Stress : Not on file   Social Connections:     Frequency of Communication with Friends and Family: Not on file    Frequency of Social Gatherings with Friends and Family: Not on file    Attends Sikhism Services: Not on file    Active Member of [address] [place] [address] [employer] or Organizations: Not on file    Attends Club or Organization Meetings: Not on file    Marital Status: Not on file   Intimate Partner Violence:     Fear of Current or Ex-Partner: Not on file    Emotionally Abused: Not on file    Physically Abused: Not on file    Sexually Abused: Not on file   Housing Stability:     Unable to Pay for Housing in the Last Year: Not on file    Number of [place] in the Last Year: Not on file    Unstable Housing in the Last Year: Not on file       Review of Systems:   Review of Systems   Constitutional: Negative for chills and fever.    HENT: Negative for ear pain, nosebleeds, sore throat and tinnitus. Eyes: Negative for photophobia and redness. Respiratory: Negative for cough, shortness of breath and wheezing. Cardiovascular: Negative for chest pain and palpitations. Gastrointestinal: Negative for abdominal pain, blood in stool, constipation, diarrhea, nausea and vomiting. Endocrine: Negative for polydipsia. Genitourinary: Negative for dysuria, hematuria and urgency. Musculoskeletal: Positive for neck stiffness. Negative for back pain and neck pain. Skin: Negative for rash. Neurological: Negative for dizziness, tremors and seizures. Hematological: Does not bruise/bleed easily. All other systems reviewed and are negative.        Physical Exam:  Vitals:    01/26/22 1430   BP: 135/79   Pulse: 72   Resp: 16   Temp: 97 °F (36.1 °C)   TempSrc: Temporal   Weight: 162 lb (73.5 kg)   Height: 5' 10\" (1.778 m)       General: Well nourished, well developed, no acute distress  Eyes:  PERRL   Conjunctiva unremarkable   ENT:  TM's intact bilaterally, no effusion   Nasal:  Nomucosal edema     No nasal drainage   Oral:  mucosa moist and pink   Neck:  Supple   No palpable cervical lymphoadenopathy   Thyroid without mass or enlargement  Resp: Lungs CTAB   Equal and adequate air exchange without accessory muscle use   No rales, rhonchi or wheeze  CV: S1S2 RRR   No murmur   Intact distal pulses   No edema  GI: Abdomen Soft, nontender, non distended   Normoactive bowel sounds   No palpable hepatosplenomegaly  MS: Physiologic ROM of all extremities    Intact distal pulses   No clubbing or cyanosis   Skin Warmand dry; no rash or lesion  Neuro: Alert and oriented; normal and intact dtr's   Psych: Euthymic mood, congruent affect      US HEAD NECK SOFT TISSUE THYROID    Result Date: 1/20/2022  EXAMINATION: THYROID ULTRASOUND 1/20/2022 COMPARISON: CT neck dated 11/29/2021 HISTORY: ORDERING SYSTEM PROVIDED HISTORY: Papillary thyroid carcinoma [facility]) TECHNOLOGIST PROVIDED HISTORY: This procedure can be scheduled via [employer]. Access your [email or web address] account by visiting [facility]. Reason for exam:->US biopsy thyroid What reading provider will be dictating this exam?->CRC Previous right cervical lymph node biopsy showed papillary thyroid carcinoma. FINDINGS: Right thyroid lobe:  5.7 x 2 x 2 cm Left thyroid lobe:  5.7 x 1.5 x 1.6 cm Isthmus:  1.4 mm Thyroid Gland: The right thyroid lobe is slightly heterogeneous with multiple nodules. The the left lobe is fairly homogeneous. Nodules: Multiple right nodules seen as described below. There is a 2 mm calcification in the mid left lobe and small nodule in the left lower pole. NODULE: Right 1 Size: 14 x 10 x 13 mm Location: Mid right lobe 1. Composition:  Mixed cystic and solid (1) 2. Echogenicity:  Hypoechoic (2) 3. Shape: Wider-than-tall (0) 4. Margins:  Smooth (0) 5. Echogenic foci:  None (0) ACR TI-RADS total points:  3 ACR TI-RADS risk category: TR3 Prior biopsy: Unknown NODULE: Right 2 Size: 16 x 12 x 13 mm Location: Right upper pole 1. Composition:  Almost completely solid (2) 2. Echogenicity:  Hypoechoic (2) 3. Shape: Wider-than-tall (0) 4. Margins:  Irregular (2) 5. Echogenic foci:  Punctate echogenic foci (3) ACR TI-RADS total points:  9 ACR TI-RADS risk category: TR5 Prior biopsy: Unknown NODULE: Right 3 Size: 10 x 8 x 10 mm Location: Medial mid right lobe 1. Composition:  Almost completely solid (2) 2. Echogenicity:  Hypoechoic (2) 3. Shape: Wider-than-tall (0) 4. Margins:  Smooth (0) 5. Echogenic foci:  Punctate echogenic foci (3) ACR TI-RADS total points:  7 ACR TI-RADS risk category: TR5 Prior biopsy: Unknown NODULE: Left 1 Size: 7 x 7 x 6 mm Location: Left lower pole 1. Composition:  Solid (2) 2. Echogenicity:  Anechoic (0) 3. Shape: Wider-than-tall (0) 4.  Margins:  Smooth (0) 5. Echogenic foci:  None (0) ACR TI-RADS total points:  2 ACR TI-RADS risk category: TR2 Prior biopsy: Unknown Scratch Cervical lymphadenopathy: There are 3 adjacent abnormal appearing right level 1 submandibular lymph nodes arranged in a linear configuration. These all appear hypoechoic with no evidence of fatty hilum. The more superior of these measures 2.2 x 1.2 x 2 cm. This appears more homogeneous and hypoechoic. The 2nd lymph node measures 2 x 1.4 x 2 cm and contains multiple punctate calcifications. The more inferior nodule measures 1.9 x 1 x 1.7 cm. In view of the history of papillary thyroid carcinoma, these are suspicious for metastatic disease. It should be noted that although the prior CT study from November 2021 did show abnormal right cervical lymph nodes, no level 1 nodes was seen on the prior CT images. 1. Multiple thyroid nodules as noted below. 2. Multiple abnormal right submandibular nodes, suspicious for metastatic disease. NODULE right 1: ACR TI-RADS TR3: Recommend:  No follow-up. NODULE right 2: ACR TI-RADS TR5: Recommend:  Ultrasound-guided fine needle aspiration. It should be noted that ultrasound guided FNA of this nodule was performed today. NODULE right 3: ACR TI-RADS TR5: Recommend: Follow-up would depend on results of the biopsy performed today. The nodule does meet the criteria for FNA. NODULE left 1: ACR TI-RADS TR2: Recommend:  No follow-up. ACR TI-RADS recommendations: TR5 (>= 7 points):  FNA if >= 1 cm; follow-up if 0.5-0.9 cm in 1, 2, 3, 4, and 5 years TR4 (4-6 points):  FNA if >= 1.5 cm; follow-up if 1.0-1.4 cm in 1, 2, 3, and 5 years TR3 (3 points):  FNA if >= 2.5 cm; follow-up if 1.5-2.4 cm in 1, 3, and 5 years TR2 (2 points):  No FNA or follow-up TR1 (0 points):  No FNA or follow-up ACR TI-RADS recommends that no more than two nodules with the highest ACR TI-RADS point total should be biopsied and no more than four nodules should be followed.      US FINE NEEDLE ASPIRATION    Result Date: 1/20/2022  PROCEDURE: ULTRASOUND GUIDED THYROID FNA 1/20/2022 COMPARISON: Thyroid ultrasound dated 01/20/2022, about 1 hour prior HISTORY: ORDERING SYSTEM PROVIDED HISTORY: Thyroid ca [facility]) TECHNOLOGIST PROVIDED HISTORY: Reason for exam:->Thyroid ca [facility]) What reading provider will be dictating this exam?->CRC TECHNIQUE: Informed consent was obtained after the procedure was discussed in detail including the risk, benefits, and alternatives. Universal protocol was followed. The neck was prepped and draped in sterile fashion and local anesthesia was achieved with lidocaine. 25 gauge needle was advanced under ultrasound guidance into a right upper pole thyroid nodule and fine-needle aspiration was performed. 3 passes were performed and the patient tolerated the procedure well. FINDINGS: Initial images of the right thyroid lobe and neck again demonstrate multiple right thyroid nodules and multiple abnormal appearing right submandibular lymph nodes. It was decided to biopsy the TR 5 nodule in the right upper pole. Intra procedural images demonstrate good needle position within the nodule. Post biopsy images show no significant hemorrhage about the biopsy site. Successful ultrasound-guided fine-needle aspiration of the TR 5 nodule in the right upper pole. Assessment/Plan:  3 39-year-old male with papillary thyroid carcinoma with metastasis to lymph nodes. We will plan for total thyroidectomy with lymph node dissection    Risks of the procedure explained to the patient including, but not limited to bleeding, infection, postoperative hematoma requiring reoperation, injury to nerves and surrounding structures such as recurrent and superficial laryngeal nerves and major vascular structures of the neck. Patient understands these risks and consents to the procedure.         Electronically signed by Connie Feliz DO on 1/28/22 at 12:17 PM EST

## 2022-01-28 NOTE — H&P
History and Physical    Patient's Name/Date of Birth: Sonia Singh / 1990 (47 y.o.)    Date: January 28, 2022     Chief Complaint:   Chief Complaint   Patient presents with    Results     thyroid biopsy        HPI: Patient is a 66-year-old male with papillary thyroid carcinoma. He originally presented with complaint of 3-year history of swollen lymph nodes in his right neck. Excisional biopsy revealed this to be papillary thyroid carcinoma. He has subsequently underwent ultrasound with biopsy of right thyroid mass. This also came back as papillary thyroid carcinoma. He has completed CT of the chest as well as neck. Past Medical History:   Diagnosis Date    Enlarged lymph node     Moderate episode of recurrent major depressive disorder [facility])        Past Surgical History:   Procedure Laterality Date    AXILLARY SURGERY Right 12/7/2021    EXCISIONAL BIOPSY OF RIGHT CERVICAL LYMPH NODE performed by Kal Mcknight DO at [facility]      left arm age 7        Prior to Admission medications    Medication Sig Start Date End Date Taking?  Authorizing Provider   OLANZapine (ZYPREXA) 15 MG tablet Take 15 mg by mouth nightly   Yes Historical Provider, MD   OXcarbazepine (TRILEPTAL) 300 MG tablet Take 300 mg by mouth every morning   Yes Historical Provider, MD   OXcarbazepine (TRILEPTAL) 600 MG tablet Take 600 mg by mouth nightly   Yes Historical Provider, MD   omeprazole (PRILOSEC) 40 MG delayed release capsule Take 1 capsule by mouth every morning (before breakfast) 11/29/21  Yes Sandy Weiss DO   ARIPiprazole (ABILIFY) 10 MG tablet Take 15 mg by mouth daily  11/8/21  Yes Historical Provider, MD       No Known Allergies    Family History   Problem Relation Age of Onset    Other Father           Social History     Socioeconomic History    Marital status: Single     Spouse name: Not on file    Number of children: 11    Years of education: 11th    Highest education level: Not on file Occupational History    Not on file   Tobacco Use    Smoking status: Former Smoker     Packs/day: 1.00     Types: Cigarettes, E-Cigarettes    Smokeless tobacco: Never Used   Vaping Use    Vaping Use: Former   Substance and Sexual Activity    Alcohol use: Not Currently    Drug use: Yes     Frequency: 80.0 times per week     Types: Marijuana (Thyra Oxana), Cocaine     Comment: last use 4 months ago, 9/2021    Sexual activity: Yes     Partners: Female   Other Topics Concern    Not on file   Social History Narrative    Not on file     Social Determinants of Health     Financial Resource Strain:     Difficulty of Paying Living Expenses: Not on file   Food Insecurity:     Worried About Running Out of Food in the Last Year: Not on file    Meg of Food in the Last Year: Not on file   Transportation Needs:     Lack of Transportation (Medical): Not on file    Lack of Transportation (Non-Medical): Not on file   Physical Activity:     Days of Exercise per Week: Not on file    Minutes of Exercise per Session: Not on file   Stress:     Feeling of Stress : Not on file   Social Connections:     Frequency of Communication with Friends and Family: Not on file    Frequency of Social Gatherings with Friends and Family: Not on file    Attends Latter-day Services: Not on file    Active Member of [address] [place] [address] [employer] or Organizations: Not on file    Attends Club or Organization Meetings: Not on file    Marital Status: Not on file   Intimate Partner Violence:     Fear of Current or Ex-Partner: Not on file    Emotionally Abused: Not on file    Physically Abused: Not on file    Sexually Abused: Not on file   Housing Stability:     Unable to Pay for Housing in the Last Year: Not on file    Number of [place] in the Last Year: Not on file    Unstable Housing in the Last Year: Not on file       Review of Systems:   Review of Systems   Constitutional: Negative for chills and fever.    HENT: Negative for ear pain, nosebleeds, sore throat and tinnitus. Eyes: Negative for photophobia and redness. Respiratory: Negative for cough, shortness of breath and wheezing. Cardiovascular: Negative for chest pain and palpitations. Gastrointestinal: Negative for abdominal pain, blood in stool, constipation, diarrhea, nausea and vomiting. Endocrine: Negative for polydipsia. Genitourinary: Negative for dysuria, hematuria and urgency. Musculoskeletal: Positive for neck stiffness. Negative for back pain and neck pain. Skin: Negative for rash. Neurological: Negative for dizziness, tremors and seizures. Hematological: Does not bruise/bleed easily. All other systems reviewed and are negative.        Physical Exam:  Vitals:    01/26/22 1430   BP: 135/79   Pulse: 72   Resp: 16   Temp: 97 °F (36.1 °C)   TempSrc: Temporal   Weight: 162 lb (73.5 kg)   Height: 5' 10\" (1.778 m)       General: Well nourished, well developed, no acute distress  Eyes:  PERRL   Conjunctiva unremarkable   ENT:  TM's intact bilaterally, no effusion   Nasal:  Nomucosal edema     No nasal drainage   Oral:  mucosa moist and pink   Neck:  Supple   No palpable cervical lymphoadenopathy   Thyroid without mass or enlargement  Resp: Lungs CTAB   Equal and adequate air exchange without accessory muscle use   No rales, rhonchi or wheeze  CV: S1S2 RRR   No murmur   Intact distal pulses   No edema  GI: Abdomen Soft, nontender, non distended   Normoactive bowel sounds   No palpable hepatosplenomegaly  MS: Physiologic ROM of all extremities    Intact distal pulses   No clubbing or cyanosis   Skin Warmand dry; no rash or lesion  Neuro: Alert and oriented; normal and intact dtr's   Psych: Euthymic mood, congruent affect      US HEAD NECK SOFT TISSUE THYROID    Result Date: 1/20/2022  EXAMINATION: THYROID ULTRASOUND 1/20/2022 COMPARISON: CT neck dated 11/29/2021 HISTORY: ORDERING SYSTEM PROVIDED HISTORY: Papillary thyroid carcinoma [facility]) TECHNOLOGIST PROVIDED HISTORY: This procedure can abnormal appearing right level 1 submandibular lymph nodes arranged in a linear configuration. These all appear hypoechoic with no evidence of fatty hilum. The more superior of these measures 2.2 x 1.2 x 2 cm. This appears more homogeneous and hypoechoic. The 2nd lymph node measures 2 x 1.4 x 2 cm and contains multiple punctate calcifications. The more inferior nodule measures 1.9 x 1 x 1.7 cm. In view of the history of papillary thyroid carcinoma, these are suspicious for metastatic disease. It should be noted that although the prior CT study from November 2021 did show abnormal right cervical lymph nodes, no level 1 nodes was seen on the prior CT images. 1. Multiple thyroid nodules as noted below. 2. Multiple abnormal right submandibular nodes, suspicious for metastatic disease. NODULE right 1: ACR TI-RADS TR3: Recommend:  No follow-up. NODULE right 2: ACR TI-RADS TR5: Recommend:  Ultrasound-guided fine needle aspiration. It should be noted that ultrasound guided FNA of this nodule was performed today. NODULE right 3: ACR TI-RADS TR5: Recommend: Follow-up would depend on results of the biopsy performed today. The nodule does meet the criteria for FNA. NODULE left 1: ACR TI-RADS TR2: Recommend:  No follow-up. ACR TI-RADS recommendations: TR5 (>= 7 points):  FNA if >= 1 cm; follow-up if 0.5-0.9 cm in 1, 2, 3, 4, and 5 years TR4 (4-6 points):  FNA if >= 1.5 cm; follow-up if 1.0-1.4 cm in 1, 2, 3, and 5 years TR3 (3 points):  FNA if >= 2.5 cm; follow-up if 1.5-2.4 cm in 1, 3, and 5 years TR2 (2 points):  No FNA or follow-up TR1 (0 points):  No FNA or follow-up ACR TI-RADS recommends that no more than two nodules with the highest ACR TI-RADS point total should be biopsied and no more than four nodules should be followed.      US FINE NEEDLE ASPIRATION    Result Date: 1/20/2022  PROCEDURE: ULTRASOUND GUIDED THYROID FNA 1/20/2022 COMPARISON: Thyroid ultrasound dated 01/20/2022, about 1 hour prior HISTORY: ORDERING SYSTEM PROVIDED HISTORY: Thyroid ca [facility]) TECHNOLOGIST PROVIDED HISTORY: Reason for exam:->Thyroid ca [facility]) What reading provider will be dictating this exam?->CRC TECHNIQUE: Informed consent was obtained after the procedure was discussed in detail including the risk, benefits, and alternatives. Universal protocol was followed. The neck was prepped and draped in sterile fashion and local anesthesia was achieved with lidocaine. 25 gauge needle was advanced under ultrasound guidance into a right upper pole thyroid nodule and fine-needle aspiration was performed. 3 passes were performed and the patient tolerated the procedure well. FINDINGS: Initial images of the right thyroid lobe and neck again demonstrate multiple right thyroid nodules and multiple abnormal appearing right submandibular lymph nodes. It was decided to biopsy the TR 5 nodule in the right upper pole. Intra procedural images demonstrate good needle position within the nodule. Post biopsy images show no significant hemorrhage about the biopsy site. Successful ultrasound-guided fine-needle aspiration of the TR 5 nodule in the right upper pole. Assessment/Plan:  3 22-year-old male with papillary thyroid carcinoma with metastasis to lymph nodes. We will plan for total thyroidectomy with lymph node dissection    Risks of the procedure explained to the patient including, but not limited to bleeding, infection, postoperative hematoma requiring reoperation, injury to nerves and surrounding structures such as recurrent and superficial laryngeal nerves and major vascular structures of the neck. Patient understands these risks and consents to the procedure.

## 2022-01-29 ENCOUNTER — OFFICE VISIT (OUTPATIENT)
Dept: PRIMARY CARE CLINIC | Age: 32
End: 2022-01-29
Payer: MEDICAID

## 2022-01-29 VITALS
WEIGHT: 165 LBS | BODY MASS INDEX: 23.62 KG/M2 | HEART RATE: 79 BPM | TEMPERATURE: 97.1 F | RESPIRATION RATE: 18 BRPM | SYSTOLIC BLOOD PRESSURE: 108 MMHG | HEIGHT: 70 IN | DIASTOLIC BLOOD PRESSURE: 70 MMHG | OXYGEN SATURATION: 98 %

## 2022-01-29 DIAGNOSIS — C73 PAPILLARY THYROID CARCINOMA (HCC): Primary | ICD-10-CM

## 2022-01-29 DIAGNOSIS — F33.2 MAJOR DEPRESSIVE DISORDER, RECURRENT, SEVERE WITHOUT PSYCHOTIC FEATURES (HCC): ICD-10-CM

## 2022-01-29 PROCEDURE — 99213 OFFICE O/P EST LOW 20 MIN: CPT | Performed by: STUDENT IN AN ORGANIZED HEALTH CARE EDUCATION/TRAINING PROGRAM

## 2022-01-29 ASSESSMENT — PATIENT HEALTH QUESTIONNAIRE - PHQ9
1. LITTLE INTEREST OR PLEASURE IN DOING THINGS: 0
SUM OF ALL RESPONSES TO PHQ QUESTIONS 1-9: 0
SUM OF ALL RESPONSES TO PHQ9 QUESTIONS 1 & 2: 0
5. POOR APPETITE OR OVEREATING: 0
8. MOVING OR SPEAKING SO SLOWLY THAT OTHER PEOPLE COULD HAVE NOTICED. OR THE OPPOSITE, BEING SO FIGETY OR RESTLESS THAT YOU HAVE BEEN MOVING AROUND A LOT MORE THAN USUAL: 0
2. FEELING DOWN, DEPRESSED OR HOPELESS: 0
7. TROUBLE CONCENTRATING ON THINGS, SUCH AS READING THE NEWSPAPER OR WATCHING TELEVISION: 0
SUM OF ALL RESPONSES TO PHQ QUESTIONS 1-9: 0
3. TROUBLE FALLING OR STAYING ASLEEP: 0
6. FEELING BAD ABOUT YOURSELF - OR THAT YOU ARE A FAILURE OR HAVE LET YOURSELF OR YOUR FAMILY DOWN: 0
4. FEELING TIRED OR HAVING LITTLE ENERGY: 0
10. IF YOU CHECKED OFF ANY PROBLEMS, HOW DIFFICULT HAVE THESE PROBLEMS MADE IT FOR YOU TO DO YOUR WORK, TAKE CARE OF THINGS AT HOME, OR GET ALONG WITH OTHER PEOPLE: 0
9. THOUGHTS THAT YOU WOULD BE BETTER OFF DEAD, OR OF HURTING YOURSELF: 0

## 2022-02-03 NOTE — PROGRESS NOTES
[facility] PRE-ADMISSION TESTING INSTRUCTIONS    The Preadmission Testing patient is instructed accordingly using the following criteria (check applicable):    ARRIVAL INSTRUCTIONS:  [x] Parking the day of Surgery is located in the Main Entrance lot. Upon entering the door, make an immediate right to the surgery reception desk    [x] Bring photo ID and insurance card    [] Bring in a copy of Living will or Durable Power of  papers. [x] Please be sure to arrange for responsible adult to provide transportation to and from the hospital    [x] Please arrange for responsible adult to be with you for the 24 hour period post procedure due to having anesthesia      GENERAL INSTRUCTIONS:    [x] Nothing by mouth after midnight, including gum, candy, mints or water    [x] You may brush your teeth, but do not swallow any water    [x] Take medications as instructed with 1-2 oz of water    [] Stop herbal supplements and vitamins 5 days prior to procedure    [] Follow preop dosing of blood thinners per physician instructions    [] Take 1/2 dose of evening insulin, but no insulin after midnight    [] No oral diabetic medications after midnight    [] If diabetic and have low blood sugar or feel symptomatic, take 1-2oz apple juice only    [] Bring inhalers day of surgery    [] Bring C-PAP/ Bi-Pap day of surgery    [] Bring urine specimen day of surgery    [x] Shower or bath with soap, lather and rinse well, AM of Surgery, no lotion, powders or creams to surgical site    [] Follow bowel prep as instructed per surgeon    [x] No tobacco products within 24 hours of surgery     [x] No alcohol or illegal drug use within 24 hours of surgery.     [x] Jewelry, body piercing's, eyeglasses, contact lenses and dentures are not permitted into surgery (bring cases)      [] Please do not wear any nail polish, make up or hair products on the day of surgery    [x] You can expect a call the business day prior to procedure to notify you if your arrival time changes    [] If you receive a survey after surgery we would greatly appreciate your comments    [] Parent/guardian of a minor must accompany their child and remain on the premises  the entire time they are under our care     [] Pediatric patients may bring favorite toy, blanket or comfort item with them    [] A caregiver or family member must remain with the patient during their stay if they are mentally handicapped, have dementia, disoriented or unable to use a call light or would be a safety concern if left unattended    [x] Please notify surgeon if you develop any illness between now and time of surgery (cold, cough, sore throat, fever, nausea, vomiting) or any signs of infections  including skin, wounds, and dental.    [x]  The Outpatient Pharmacy is available to fill your prescription here on your day of surgery, ask your preop nurse for details    [] Other instructions    EDUCATIONAL MATERIALS PROVIDED:    [] PAT Preoperative Education Packet/Booklet     [] Medication List    [] Transfusion bracelet applied with instructions    [] Shower with soap, lather and rinse well, and use CHG wipes provided the evening before surgery as instructed    [] Incentive spirometer with instructions        Have you been tested for COVID  No           Have you been told you were positive for COVID No  Have you had any known exposure to someone that is positive for COVID No  Do you have a cough                   No              Do you have shortness of breath No                 Do you have a sore throat            No                Are you having chills                    No                Are you having muscle aches. No                    Please come to the hospital wearing a mask and have your significant other wear a mask as well. Both of you should check your temperature before leaving to come here,  if it is 100 or higher please call [phone] for instruction.

## 2022-02-07 ENCOUNTER — ANESTHESIA EVENT (OUTPATIENT)
Dept: OPERATING ROOM | Age: 32
DRG: 626 | End: 2022-02-07

## 2022-02-08 ENCOUNTER — ANESTHESIA (OUTPATIENT)
Dept: OPERATING ROOM | Age: 32
DRG: 626 | End: 2022-02-08

## 2022-02-08 ENCOUNTER — HOSPITAL ENCOUNTER (INPATIENT)
Age: 32
LOS: 1 days | Discharge: HOME | DRG: 626 | End: 2022-02-09
Attending: SURGERY | Admitting: SURGERY
Payer: MEDICAID

## 2022-02-08 VITALS — SYSTOLIC BLOOD PRESSURE: 124 MMHG | TEMPERATURE: 63.3 F | DIASTOLIC BLOOD PRESSURE: 69 MMHG | OXYGEN SATURATION: 100 %

## 2022-02-08 LAB
CALCIUM IONIZED: 1.19 MMOL/L (ref 1.15–1.33)
HCT VFR BLD CALC: 42.6 % (ref 37–54)
HEMOGLOBIN: 13.8 G/DL (ref 12.5–16.5)
MCH RBC QN AUTO: 28.3 PG (ref 26–35)
MCHC RBC AUTO-ENTMCNC: 32.4 % (ref 32–34.5)
MCV RBC AUTO: 87.5 FL (ref 80–99.9)
PDW BLD-RTO: 13.4 FL (ref 11.5–15)
PLATELET # BLD: 207 E9/L (ref 130–450)
PMV BLD AUTO: 10.5 FL (ref 7–12)
RBC # BLD: 4.87 E12/L (ref 3.8–5.8)
WBC # BLD: 6.8 E9/L (ref 4.5–11.5)

## 2022-02-08 PROCEDURE — 0CJS8ZZ INSPECTION OF LARYNX, VIA NATURAL OR ARTIFICIAL OPENING ENDOSCOPIC: ICD-10-PCS | Performed by: SURGERY

## 2022-02-08 PROCEDURE — 2500000003 HC RX 250 WO HCPCS

## 2022-02-08 PROCEDURE — 31575 DIAGNOSTIC LARYNGOSCOPY: CPT | Performed by: SURGERY

## 2022-02-08 PROCEDURE — 2580000003 HC RX 258: Performed by: STUDENT IN AN ORGANIZED HEALTH CARE EDUCATION/TRAINING PROGRAM

## 2022-02-08 PROCEDURE — 0GTK0ZZ RESECTION OF THYROID GLAND, OPEN APPROACH: ICD-10-PCS | Performed by: SURGERY

## 2022-02-08 PROCEDURE — 2720000010 HC SURG SUPPLY STERILE: Performed by: SURGERY

## 2022-02-08 PROCEDURE — 3600000003 HC SURGERY LEVEL 3 BASE: Performed by: SURGERY

## 2022-02-08 PROCEDURE — 2580000003 HC RX 258: Performed by: SURGERY

## 2022-02-08 PROCEDURE — 36415 COLL VENOUS BLD VENIPUNCTURE: CPT

## 2022-02-08 PROCEDURE — 1200000000 HC SEMI PRIVATE

## 2022-02-08 PROCEDURE — 88307 TISSUE EXAM BY PATHOLOGIST: CPT

## 2022-02-08 PROCEDURE — 6370000000 HC RX 637 (ALT 250 FOR IP)

## 2022-02-08 PROCEDURE — 3700000000 HC ANESTHESIA ATTENDED CARE: Performed by: SURGERY

## 2022-02-08 PROCEDURE — 7100000000 HC PACU RECOVERY - FIRST 15 MIN: Performed by: SURGERY

## 2022-02-08 PROCEDURE — 99220 PR INITIAL OBSERVATION CARE/DAY 70 MINUTES: CPT | Performed by: SURGERY

## 2022-02-08 PROCEDURE — 6360000002 HC RX W HCPCS: Performed by: STUDENT IN AN ORGANIZED HEALTH CARE EDUCATION/TRAINING PROGRAM

## 2022-02-08 PROCEDURE — 2500000003 HC RX 250 WO HCPCS: Performed by: SURGERY

## 2022-02-08 PROCEDURE — 07T10ZZ RESECTION OF RIGHT NECK LYMPHATIC, OPEN APPROACH: ICD-10-PCS | Performed by: SURGERY

## 2022-02-08 PROCEDURE — 85027 COMPLETE CBC AUTOMATED: CPT

## 2022-02-08 PROCEDURE — 6360000002 HC RX W HCPCS

## 2022-02-08 PROCEDURE — 3600000013 HC SURGERY LEVEL 3 ADDTL 15MIN: Performed by: SURGERY

## 2022-02-08 PROCEDURE — 6360000002 HC RX W HCPCS: Performed by: SURGERY

## 2022-02-08 PROCEDURE — 2709999900 HC NON-CHARGEABLE SUPPLY: Performed by: SURGERY

## 2022-02-08 PROCEDURE — 6370000000 HC RX 637 (ALT 250 FOR IP): Performed by: STUDENT IN AN ORGANIZED HEALTH CARE EDUCATION/TRAINING PROGRAM

## 2022-02-08 PROCEDURE — C1713 ANCHOR/SCREW BN/BN,TIS/BN: HCPCS | Performed by: SURGERY

## 2022-02-08 PROCEDURE — 60252 REMOVAL OF THYROID: CPT | Performed by: SURGERY

## 2022-02-08 PROCEDURE — 38724 REMOVAL OF LYMPH NODES NECK: CPT | Performed by: SURGERY

## 2022-02-08 PROCEDURE — 7100000001 HC PACU RECOVERY - ADDTL 15 MIN: Performed by: SURGERY

## 2022-02-08 PROCEDURE — 82330 ASSAY OF CALCIUM: CPT

## 2022-02-08 PROCEDURE — 3700000001 HC ADD 15 MINUTES (ANESTHESIA): Performed by: SURGERY

## 2022-02-08 RX ORDER — SENNA AND DOCUSATE SODIUM 50; 8.6 MG/1; MG/1
1 TABLET, FILM COATED ORAL 2 TIMES DAILY
Status: DISCONTINUED | OUTPATIENT
Start: 2022-02-08 | End: 2022-02-09 | Stop reason: HOSPADM

## 2022-02-08 RX ORDER — SODIUM CHLORIDE 9 MG/ML
25 INJECTION, SOLUTION INTRAVENOUS PRN
Status: DISCONTINUED | OUTPATIENT
Start: 2022-02-08 | End: 2022-02-08 | Stop reason: HOSPADM

## 2022-02-08 RX ORDER — GLYCOPYRROLATE 1 MG/5 ML
SYRINGE (ML) INTRAVENOUS PRN
Status: DISCONTINUED | OUTPATIENT
Start: 2022-02-08 | End: 2022-02-08 | Stop reason: SDUPTHER

## 2022-02-08 RX ORDER — ONDANSETRON 2 MG/ML
4 INJECTION INTRAMUSCULAR; INTRAVENOUS
Status: DISCONTINUED | OUTPATIENT
Start: 2022-02-08 | End: 2022-02-08 | Stop reason: HOSPADM

## 2022-02-08 RX ORDER — ONDANSETRON 4 MG/1
4 TABLET, ORALLY DISINTEGRATING ORAL EVERY 8 HOURS PRN
Status: DISCONTINUED | OUTPATIENT
Start: 2022-02-08 | End: 2022-02-09 | Stop reason: HOSPADM

## 2022-02-08 RX ORDER — SODIUM CHLORIDE 0.9 % (FLUSH) 0.9 %
5-40 SYRINGE (ML) INJECTION EVERY 12 HOURS SCHEDULED
Status: DISCONTINUED | OUTPATIENT
Start: 2022-02-08 | End: 2022-02-09 | Stop reason: HOSPADM

## 2022-02-08 RX ORDER — SEVOFLURANE 250 ML/250ML
LIQUID RESPIRATORY (INHALATION) CONTINUOUS PRN
Status: DISCONTINUED | OUTPATIENT
Start: 2022-02-08 | End: 2022-02-08

## 2022-02-08 RX ORDER — SODIUM CHLORIDE 0.9 % (FLUSH) 0.9 %
10 SYRINGE (ML) INJECTION EVERY 12 HOURS SCHEDULED
Status: DISCONTINUED | OUTPATIENT
Start: 2022-02-08 | End: 2022-02-08 | Stop reason: HOSPADM

## 2022-02-08 RX ORDER — MORPHINE SULFATE 2 MG/ML
1 INJECTION, SOLUTION INTRAMUSCULAR; INTRAVENOUS EVERY 5 MIN PRN
Status: DISCONTINUED | OUTPATIENT
Start: 2022-02-08 | End: 2022-02-08 | Stop reason: HOSPADM

## 2022-02-08 RX ORDER — PROPOFOL 10 MG/ML
INJECTION, EMULSION INTRAVENOUS PRN
Status: DISCONTINUED | OUTPATIENT
Start: 2022-02-08 | End: 2022-02-08 | Stop reason: SDUPTHER

## 2022-02-08 RX ORDER — OYSTER SHELL CALCIUM WITH VITAMIN D 500; 200 MG/1; [IU]/1
2 TABLET, FILM COATED ORAL 2 TIMES DAILY
Status: DISCONTINUED | OUTPATIENT
Start: 2022-02-08 | End: 2022-02-09 | Stop reason: SDUPTHER

## 2022-02-08 RX ORDER — OXYCODONE HYDROCHLORIDE 5 MG/1
5 TABLET ORAL EVERY 4 HOURS PRN
Status: DISCONTINUED | OUTPATIENT
Start: 2022-02-08 | End: 2022-02-09 | Stop reason: HOSPADM

## 2022-02-08 RX ORDER — FENTANYL CITRATE 50 UG/ML
INJECTION, SOLUTION INTRAMUSCULAR; INTRAVENOUS PRN
Status: DISCONTINUED | OUTPATIENT
Start: 2022-02-08 | End: 2022-02-08 | Stop reason: SDUPTHER

## 2022-02-08 RX ORDER — ACETAMINOPHEN 325 MG/1
650 TABLET ORAL EVERY 6 HOURS
Status: DISCONTINUED | OUTPATIENT
Start: 2022-02-08 | End: 2022-02-09 | Stop reason: HOSPADM

## 2022-02-08 RX ORDER — PHENYLEPHRINE HCL IN 0.9% NACL 1 MG/10 ML
SYRINGE (ML) INTRAVENOUS PRN
Status: DISCONTINUED | OUTPATIENT
Start: 2022-02-08 | End: 2022-02-08 | Stop reason: SDUPTHER

## 2022-02-08 RX ORDER — OXCARBAZEPINE 300 MG/1
600 TABLET, FILM COATED ORAL NIGHTLY
Status: DISCONTINUED | OUTPATIENT
Start: 2022-02-08 | End: 2022-02-09 | Stop reason: HOSPADM

## 2022-02-08 RX ORDER — ONDANSETRON 2 MG/ML
4 INJECTION INTRAMUSCULAR; INTRAVENOUS EVERY 6 HOURS PRN
Status: DISCONTINUED | OUTPATIENT
Start: 2022-02-08 | End: 2022-02-09 | Stop reason: HOSPADM

## 2022-02-08 RX ORDER — SODIUM CHLORIDE 9 MG/ML
25 INJECTION, SOLUTION INTRAVENOUS PRN
Status: DISCONTINUED | OUTPATIENT
Start: 2022-02-08 | End: 2022-02-09 | Stop reason: HOSPADM

## 2022-02-08 RX ORDER — ARIPIPRAZOLE 15 MG/1
15 TABLET ORAL DAILY
Status: DISCONTINUED | OUTPATIENT
Start: 2022-02-08 | End: 2022-02-09 | Stop reason: HOSPADM

## 2022-02-08 RX ORDER — DEXAMETHASONE SODIUM PHOSPHATE 4 MG/ML
INJECTION, SOLUTION INTRA-ARTICULAR; INTRALESIONAL; INTRAMUSCULAR; INTRAVENOUS; SOFT TISSUE PRN
Status: DISCONTINUED | OUTPATIENT
Start: 2022-02-08 | End: 2022-02-08 | Stop reason: SDUPTHER

## 2022-02-08 RX ORDER — SODIUM CHLORIDE 0.9 % (FLUSH) 0.9 %
5-40 SYRINGE (ML) INJECTION PRN
Status: DISCONTINUED | OUTPATIENT
Start: 2022-02-08 | End: 2022-02-09 | Stop reason: HOSPADM

## 2022-02-08 RX ORDER — MEPERIDINE HYDROCHLORIDE 25 MG/ML
12.5 INJECTION INTRAMUSCULAR; INTRAVENOUS; SUBCUTANEOUS EVERY 5 MIN PRN
Status: DISCONTINUED | OUTPATIENT
Start: 2022-02-08 | End: 2022-02-08 | Stop reason: HOSPADM

## 2022-02-08 RX ORDER — SUCCINYLCHOLINE/SOD CL,ISO/PF 200MG/10ML
SYRINGE (ML) INTRAVENOUS PRN
Status: DISCONTINUED | OUTPATIENT
Start: 2022-02-08 | End: 2022-02-08 | Stop reason: SDUPTHER

## 2022-02-08 RX ORDER — LIDOCAINE HYDROCHLORIDE 20 MG/ML
INJECTION, SOLUTION EPIDURAL; INFILTRATION; INTRACAUDAL; PERINEURAL PRN
Status: DISCONTINUED | OUTPATIENT
Start: 2022-02-08 | End: 2022-02-08 | Stop reason: SDUPTHER

## 2022-02-08 RX ORDER — OXYCODONE HYDROCHLORIDE AND ACETAMINOPHEN 5; 325 MG/1; MG/1
1 TABLET ORAL
Status: DISCONTINUED | OUTPATIENT
Start: 2022-02-08 | End: 2022-02-08 | Stop reason: HOSPADM

## 2022-02-08 RX ORDER — MORPHINE SULFATE 2 MG/ML
2 INJECTION, SOLUTION INTRAMUSCULAR; INTRAVENOUS EVERY 5 MIN PRN
Status: DISCONTINUED | OUTPATIENT
Start: 2022-02-08 | End: 2022-02-08 | Stop reason: HOSPADM

## 2022-02-08 RX ORDER — CEFAZOLIN SODIUM 1 G/3ML
INJECTION, POWDER, FOR SOLUTION INTRAMUSCULAR; INTRAVENOUS PRN
Status: DISCONTINUED | OUTPATIENT
Start: 2022-02-08 | End: 2022-02-08 | Stop reason: SDUPTHER

## 2022-02-08 RX ORDER — OXYCODONE HYDROCHLORIDE 5 MG/1
10 TABLET ORAL EVERY 4 HOURS PRN
Status: DISCONTINUED | OUTPATIENT
Start: 2022-02-08 | End: 2022-02-09 | Stop reason: HOSPADM

## 2022-02-08 RX ORDER — LIDOCAINE HYDROCHLORIDE AND EPINEPHRINE 10; 10 MG/ML; UG/ML
INJECTION, SOLUTION INFILTRATION; PERINEURAL PRN
Status: DISCONTINUED | OUTPATIENT
Start: 2022-02-08 | End: 2022-02-08 | Stop reason: ALTCHOICE

## 2022-02-08 RX ORDER — MIDAZOLAM HYDROCHLORIDE 1 MG/ML
INJECTION INTRAMUSCULAR; INTRAVENOUS PRN
Status: DISCONTINUED | OUTPATIENT
Start: 2022-02-08 | End: 2022-02-08 | Stop reason: SDUPTHER

## 2022-02-08 RX ORDER — SODIUM CHLORIDE 9 MG/ML
INJECTION, SOLUTION INTRAVENOUS CONTINUOUS
Status: DISCONTINUED | OUTPATIENT
Start: 2022-02-08 | End: 2022-02-08

## 2022-02-08 RX ORDER — SODIUM CHLORIDE 0.9 % (FLUSH) 0.9 %
10 SYRINGE (ML) INJECTION PRN
Status: DISCONTINUED | OUTPATIENT
Start: 2022-02-08 | End: 2022-02-08 | Stop reason: HOSPADM

## 2022-02-08 RX ORDER — ONDANSETRON 2 MG/ML
INJECTION INTRAMUSCULAR; INTRAVENOUS PRN
Status: DISCONTINUED | OUTPATIENT
Start: 2022-02-08 | End: 2022-02-08 | Stop reason: SDUPTHER

## 2022-02-08 RX ORDER — POLYETHYLENE GLYCOL 3350 17 G/17G
17 POWDER, FOR SOLUTION ORAL DAILY
Status: DISCONTINUED | OUTPATIENT
Start: 2022-02-08 | End: 2022-02-09 | Stop reason: HOSPADM

## 2022-02-08 RX ORDER — OXCARBAZEPINE 300 MG/1
300 TABLET, FILM COATED ORAL EVERY MORNING
Status: DISCONTINUED | OUTPATIENT
Start: 2022-02-09 | End: 2022-02-09 | Stop reason: HOSPADM

## 2022-02-08 RX ADMIN — Medication 100 MCG: at 12:01

## 2022-02-08 RX ADMIN — OXCARBAZEPINE 600 MG: 300 TABLET, FILM COATED ORAL at 20:58

## 2022-02-08 RX ADMIN — ACETAMINOPHEN 650 MG: 325 TABLET ORAL at 23:53

## 2022-02-08 RX ADMIN — FENTANYL CITRATE 100 MCG: 50 INJECTION, SOLUTION INTRAMUSCULAR; INTRAVENOUS at 11:41

## 2022-02-08 RX ADMIN — MIDAZOLAM 2 MG: 1 INJECTION INTRAMUSCULAR; INTRAVENOUS at 11:35

## 2022-02-08 RX ADMIN — SODIUM CHLORIDE: 9 INJECTION, SOLUTION INTRAVENOUS at 12:30

## 2022-02-08 RX ADMIN — ENOXAPARIN SODIUM 40 MG: 100 INJECTION SUBCUTANEOUS at 20:28

## 2022-02-08 RX ADMIN — Medication 0.2 MG: at 11:31

## 2022-02-08 RX ADMIN — DOCUSATE SODIUM 50 MG AND SENNOSIDES 8.6 MG 1 TABLET: 8.6; 5 TABLET, FILM COATED ORAL at 20:28

## 2022-02-08 RX ADMIN — Medication 100 MCG: at 14:06

## 2022-02-08 RX ADMIN — ONDANSETRON 4 MG: 2 INJECTION INTRAMUSCULAR; INTRAVENOUS at 11:41

## 2022-02-08 RX ADMIN — LIDOCAINE HYDROCHLORIDE 80 MG: 20 INJECTION, SOLUTION EPIDURAL; INFILTRATION; INTRACAUDAL; PERINEURAL at 11:41

## 2022-02-08 RX ADMIN — HYDROMORPHONE HYDROCHLORIDE 0.5 MG: 1 INJECTION, SOLUTION INTRAMUSCULAR; INTRAVENOUS; SUBCUTANEOUS at 18:06

## 2022-02-08 RX ADMIN — Medication 120 MG: at 11:41

## 2022-02-08 RX ADMIN — Medication 100 MCG: at 15:03

## 2022-02-08 RX ADMIN — DEXAMETHASONE SODIUM PHOSPHATE 10 MG: 4 INJECTION, SOLUTION INTRA-ARTICULAR; INTRALESIONAL; INTRAMUSCULAR; INTRAVENOUS; SOFT TISSUE at 11:41

## 2022-02-08 RX ADMIN — OXYCODONE 10 MG: 5 TABLET ORAL at 20:28

## 2022-02-08 RX ADMIN — SODIUM CHLORIDE, PRESERVATIVE FREE 10 ML: 5 INJECTION INTRAVENOUS at 20:29

## 2022-02-08 RX ADMIN — BENZOCAINE, BUTAMBEN, AND TETRACAINE HYDROCHLORIDE 1 SPRAY: .028; .004; .004 AEROSOL, SPRAY TOPICAL at 11:39

## 2022-02-08 RX ADMIN — Medication 2000 MG: at 11:39

## 2022-02-08 RX ADMIN — FENTANYL CITRATE 50 MCG: 50 INJECTION, SOLUTION INTRAMUSCULAR; INTRAVENOUS at 13:11

## 2022-02-08 RX ADMIN — CEFAZOLIN 2000 MG: 1 INJECTION, POWDER, FOR SOLUTION INTRAMUSCULAR; INTRAVENOUS at 15:29

## 2022-02-08 RX ADMIN — SODIUM CHLORIDE: 9 INJECTION, SOLUTION INTRAVENOUS at 11:28

## 2022-02-08 RX ADMIN — Medication 100 MCG: at 12:06

## 2022-02-08 RX ADMIN — PROPOFOL 200 MG: 10 INJECTION, EMULSION INTRAVENOUS at 11:41

## 2022-02-08 RX ADMIN — SODIUM CHLORIDE: 9 INJECTION, SOLUTION INTRAVENOUS at 14:46

## 2022-02-08 ASSESSMENT — PULMONARY FUNCTION TESTS
PIF_VALUE: 2
PIF_VALUE: 14
PIF_VALUE: 15
PIF_VALUE: 15
PIF_VALUE: 14
PIF_VALUE: 14
PIF_VALUE: 15
PIF_VALUE: 15
PIF_VALUE: 14
PIF_VALUE: 15
PIF_VALUE: 14
PIF_VALUE: 14
PIF_VALUE: 15
PIF_VALUE: 14
PIF_VALUE: 1
PIF_VALUE: 15
PIF_VALUE: 20
PIF_VALUE: 14
PIF_VALUE: 15
PIF_VALUE: 11
PIF_VALUE: 15
PIF_VALUE: 17
PIF_VALUE: 15
PIF_VALUE: 4
PIF_VALUE: 2
PIF_VALUE: 15
PIF_VALUE: 14
PIF_VALUE: 15
PIF_VALUE: 11
PIF_VALUE: 13
PIF_VALUE: 14
PIF_VALUE: 15
PIF_VALUE: 17
PIF_VALUE: 14
PIF_VALUE: 14
PIF_VALUE: 15
PIF_VALUE: 13
PIF_VALUE: 18
PIF_VALUE: 15
PIF_VALUE: 13
PIF_VALUE: 13
PIF_VALUE: 15
PIF_VALUE: 15
PIF_VALUE: 2
PIF_VALUE: 15
PIF_VALUE: 14
PIF_VALUE: 15
PIF_VALUE: 14
PIF_VALUE: 15
PIF_VALUE: 13
PIF_VALUE: 14
PIF_VALUE: 17
PIF_VALUE: 17
PIF_VALUE: 15
PIF_VALUE: 14
PIF_VALUE: 15
PIF_VALUE: 14
PIF_VALUE: 15
PIF_VALUE: 11
PIF_VALUE: 14
PIF_VALUE: 15
PIF_VALUE: 1
PIF_VALUE: 15
PIF_VALUE: 17
PIF_VALUE: 14
PIF_VALUE: 14
PIF_VALUE: 13
PIF_VALUE: 13
PIF_VALUE: 14
PIF_VALUE: 15
PIF_VALUE: 14
PIF_VALUE: 14
PIF_VALUE: 15
PIF_VALUE: 2
PIF_VALUE: 15
PIF_VALUE: 14
PIF_VALUE: 13
PIF_VALUE: 15
PIF_VALUE: 17
PIF_VALUE: 12
PIF_VALUE: 14
PIF_VALUE: 14
PIF_VALUE: 17
PIF_VALUE: 11
PIF_VALUE: 14
PIF_VALUE: 15
PIF_VALUE: 17
PIF_VALUE: 15
PIF_VALUE: 14
PIF_VALUE: 15
PIF_VALUE: 14
PIF_VALUE: 13
PIF_VALUE: 15
PIF_VALUE: 17
PIF_VALUE: 1
PIF_VALUE: 15
PIF_VALUE: 14
PIF_VALUE: 14
PIF_VALUE: 15
PIF_VALUE: 13
PIF_VALUE: 15
PIF_VALUE: 15
PIF_VALUE: 11
PIF_VALUE: 0
PIF_VALUE: 14
PIF_VALUE: 3
PIF_VALUE: 11
PIF_VALUE: 15
PIF_VALUE: 14
PIF_VALUE: 11
PIF_VALUE: 15
PIF_VALUE: 14
PIF_VALUE: 3
PIF_VALUE: 14
PIF_VALUE: 15
PIF_VALUE: 15
PIF_VALUE: 11
PIF_VALUE: 14
PIF_VALUE: 14
PIF_VALUE: 15
PIF_VALUE: 14
PIF_VALUE: 15
PIF_VALUE: 15
PIF_VALUE: 14
PIF_VALUE: 15
PIF_VALUE: 15
PIF_VALUE: 14
PIF_VALUE: 15
PIF_VALUE: 2
PIF_VALUE: 15
PIF_VALUE: 14
PIF_VALUE: 11
PIF_VALUE: 14
PIF_VALUE: 11
PIF_VALUE: 14
PIF_VALUE: 15
PIF_VALUE: 14
PIF_VALUE: 15
PIF_VALUE: 17
PIF_VALUE: 14
PIF_VALUE: 14
PIF_VALUE: 15
PIF_VALUE: 15
PIF_VALUE: 14
PIF_VALUE: 14
PIF_VALUE: 15
PIF_VALUE: 14
PIF_VALUE: 17
PIF_VALUE: 13
PIF_VALUE: 14
PIF_VALUE: 11
PIF_VALUE: 15
PIF_VALUE: 14
PIF_VALUE: 18
PIF_VALUE: 14
PIF_VALUE: 14
PIF_VALUE: 15
PIF_VALUE: 20
PIF_VALUE: 14
PIF_VALUE: 13
PIF_VALUE: 14
PIF_VALUE: 15
PIF_VALUE: 1
PIF_VALUE: 15
PIF_VALUE: 14
PIF_VALUE: 3
PIF_VALUE: 15
PIF_VALUE: 17
PIF_VALUE: 14
PIF_VALUE: 1
PIF_VALUE: 14
PIF_VALUE: 17
PIF_VALUE: 0
PIF_VALUE: 17
PIF_VALUE: 13
PIF_VALUE: 13
PIF_VALUE: 15
PIF_VALUE: 14
PIF_VALUE: 15
PIF_VALUE: 14
PIF_VALUE: 15
PIF_VALUE: 17
PIF_VALUE: 0
PIF_VALUE: 14
PIF_VALUE: 15
PIF_VALUE: 17
PIF_VALUE: 14
PIF_VALUE: 13
PIF_VALUE: 15
PIF_VALUE: 14
PIF_VALUE: 3
PIF_VALUE: 17
PIF_VALUE: 15
PIF_VALUE: 14
PIF_VALUE: 15
PIF_VALUE: 14
PIF_VALUE: 15
PIF_VALUE: 14
PIF_VALUE: 13
PIF_VALUE: 15
PIF_VALUE: 18
PIF_VALUE: 14
PIF_VALUE: 14
PIF_VALUE: 15
PIF_VALUE: 14

## 2022-02-08 ASSESSMENT — PAIN DESCRIPTION - PAIN TYPE
TYPE: SURGICAL PAIN
TYPE: SURGICAL PAIN
TYPE: ACUTE PAIN;SURGICAL PAIN
TYPE: SURGICAL PAIN
TYPE: ACUTE PAIN;SURGICAL PAIN

## 2022-02-08 ASSESSMENT — LIFESTYLE VARIABLES: SMOKING_STATUS: 0

## 2022-02-08 ASSESSMENT — PAIN DESCRIPTION - ONSET
ONSET: GRADUAL
ONSET: ON-GOING
ONSET: ON-GOING

## 2022-02-08 ASSESSMENT — PAIN DESCRIPTION - ORIENTATION
ORIENTATION: MID
ORIENTATION: RIGHT
ORIENTATION: RIGHT

## 2022-02-08 ASSESSMENT — PAIN SCALES - GENERAL
PAINLEVEL_OUTOF10: 7
PAINLEVEL_OUTOF10: 5
PAINLEVEL_OUTOF10: 2
PAINLEVEL_OUTOF10: 5
PAINLEVEL_OUTOF10: 7
PAINLEVEL_OUTOF10: 5
PAINLEVEL_OUTOF10: 0
PAINLEVEL_OUTOF10: 7
PAINLEVEL_OUTOF10: 8

## 2022-02-08 ASSESSMENT — PAIN - FUNCTIONAL ASSESSMENT
PAIN_FUNCTIONAL_ASSESSMENT: 0-10
PAIN_FUNCTIONAL_ASSESSMENT: PREVENTS OR INTERFERES SOME ACTIVE ACTIVITIES AND ADLS

## 2022-02-08 ASSESSMENT — PAIN DESCRIPTION - LOCATION
LOCATION: NECK
LOCATION: NECK;HEAD
LOCATION: HEAD;NECK
LOCATION: NECK
LOCATION: HEAD;NECK

## 2022-02-08 ASSESSMENT — PAIN DESCRIPTION - DESCRIPTORS
DESCRIPTORS: ACHING;SORE
DESCRIPTORS: ACHING;DISCOMFORT;SORE
DESCRIPTORS: ACHING;SORE

## 2022-02-08 ASSESSMENT — PAIN DESCRIPTION - PROGRESSION
CLINICAL_PROGRESSION: GRADUALLY IMPROVING
CLINICAL_PROGRESSION: NOT CHANGED
CLINICAL_PROGRESSION: NOT CHANGED

## 2022-02-08 ASSESSMENT — PAIN DESCRIPTION - FREQUENCY
FREQUENCY: INTERMITTENT

## 2022-02-08 NOTE — ANESTHESIA POSTPROCEDURE EVALUATION
Department of Anesthesiology  Postprocedure Note    Patient: Shin Strickland  MRN: [id number]  YOB: 1990  Date of evaluation: 2/8/2022  Time:  3:58 PM     Procedure Summary     Date: 02/08/22 Room / Location: [facility] OR 01 / [facility]    Anesthesia Start: 1128 Anesthesia Stop:     Procedure: 5 [place] (N/A Neck) Diagnosis: (THYROID CANCER)    Surgeons: Lauren Spangler DO Responsible Provider: Gilbert Martínez DO    Anesthesia Type: general ASA Status: 3          Anesthesia Type: No value filed. Shanice Phase I: Shanice Score: 10    Shanice Phase II:      Last vitals: Reviewed and per EMR flowsheets.        Anesthesia Post Evaluation    Patient location during evaluation: PACU  Patient participation: complete - patient participated  Level of consciousness: lethargic  Pain score: 0  Airway patency: patent  Nausea & Vomiting: no nausea and no vomiting  Complications: no  Cardiovascular status: blood pressure returned to baseline and hemodynamically stable  Respiratory status: acceptable  Hydration status: euvolemic

## 2022-02-08 NOTE — H&P
Update History & Physical     The patient's History and Physical of 1/26/22 was reviewed with the patient and I examined the patient. There was no change. The surgical site was confirmed by the patient and me. Plan: The risks, benefits, expected outcome, and alternative to the recommended procedure have been discussed with the patient. Patient understands and wants to proceed with the procedure. Electronically signed by Rachel Zavala MD on 2/8/2022 at 11:07 AM          History and Physical    Patient's Name/Date of Birth: Grover Kahn / 1990 (50 y.o.)    Date: January 28, 2022     Chief Complaint:   Chief Complaint   Patient presents with    Results     thyroid biopsy        HPI: Patient is a 49-year-old male with papillary thyroid carcinoma. He originally presented with complaint of 3-year history of swollen lymph nodes in his right neck. Excisional biopsy revealed this to be papillary thyroid carcinoma. He has subsequently underwent ultrasound with biopsy of right thyroid mass. This also came back as papillary thyroid carcinoma. He has completed CT of the chest as well as neck. Past Medical History:   Diagnosis Date    Enlarged lymph node     Moderate episode of recurrent major depressive disorder [facility])        Past Surgical History:   Procedure Laterality Date    AXILLARY SURGERY Right 12/7/2021    EXCISIONAL BIOPSY OF RIGHT CERVICAL LYMPH NODE performed by Penelope Zendejas DO at [id number] [facility]      left arm age 7        Prior to Admission medications    Medication Sig Start Date End Date Taking?  Authorizing Provider   OLANZapine (ZYPREXA) 15 MG tablet Take 15 mg by mouth nightly   Yes Historical Provider, MD   OXcarbazepine (TRILEPTAL) 300 MG tablet Take 300 mg by mouth every morning   Yes Historical Provider, MD   OXcarbazepine (TRILEPTAL) 600 MG tablet Take 600 mg by mouth nightly   Yes Historical Provider, MD   omeprazole (PRILOSEC) 40 MG delayed release capsule Take 1 capsule by mouth every morning (before breakfast) 11/29/21  Yes Nba Weiss DO   ARIPiprazole (ABILIFY) 10 MG tablet Take 15 mg by mouth daily  11/8/21  Yes Historical Provider, MD       No Known Allergies    Family History   Problem Relation Age of Onset    Other Father           Social History     Socioeconomic History    Marital status: Single     Spouse name: Not on file    Number of children: 11    Years of education: 11th    Highest education level: Not on file   Occupational History    Not on file   Tobacco Use    Smoking status: Former Smoker     Packs/day: 1.00     Types: Cigarettes, E-Cigarettes    Smokeless tobacco: Never Used   Vaping Use    Vaping Use: Former   Substance and Sexual Activity    Alcohol use: Not Currently    Drug use: Yes     Frequency: 80.0 times per week     Types: Marijuana (Lida Rist), Cocaine     Comment: last use 4 months ago, 9/2021    Sexual activity: Yes     Partners: Female   Other Topics Concern    Not on file   Social History Narrative    Not on file     Social Determinants of Health     Financial Resource Strain:     Difficulty of Paying Living Expenses: Not on file   Food Insecurity:     Worried About Running Out of Food in the Last Year: Not on file    Meg of Food in the Last Year: Not on file   Transportation Needs:     Lack of Transportation (Medical): Not on file    Lack of Transportation (Non-Medical):  Not on file   Physical Activity:     Days of Exercise per Week: Not on file    Minutes of Exercise per Session: Not on file   Stress:     Feeling of Stress : Not on file   Social Connections:     Frequency of Communication with Friends and Family: Not on file    Frequency of Social Gatherings with Friends and Family: Not on file    Attends Cheondoism Services: Not on file    Active Member of Clubs or Organizations: Not on file    Attends Club or Organization Meetings: Not on file    Marital Status: Not on file   Intimate Partner Violence:     Fear of Current or Ex-Partner: Not on file    Emotionally Abused: Not on file    Physically Abused: Not on file    Sexually Abused: Not on file   Housing Stability:     Unable to Pay for Housing in the Last Year: Not on file    Number of Places Lived in the Last Year: Not on file    Unstable Housing in the Last Year: Not on file       Review of Systems:   Review of Systems   Constitutional: Negative for chills and fever. HENT: Negative for ear pain, nosebleeds, sore throat and tinnitus. Eyes: Negative for photophobia and redness. Respiratory: Negative for cough, shortness of breath and wheezing. Cardiovascular: Negative for chest pain and palpitations. Gastrointestinal: Negative for abdominal pain, blood in stool, constipation, diarrhea, nausea and vomiting. Endocrine: Negative for polydipsia. Genitourinary: Negative for dysuria, hematuria and urgency. Musculoskeletal: Positive for neck stiffness. Negative for back pain and neck pain. Skin: Negative for rash. Neurological: Negative for dizziness, tremors and seizures. Hematological: Does not bruise/bleed easily. All other systems reviewed and are negative.        Physical Exam:  Vitals:    01/26/22 1430   BP: 135/79   Pulse: 72   Resp: 16   Temp: 97 °F (36.1 °C)   TempSrc: Temporal   Weight: 162 lb (73.5 kg)   Height: 5' 10\" (1.778 m)       General: Well nourished, well developed, no acute distress  Eyes:  PERRL   Conjunctiva unremarkable   ENT:  TM's intact bilaterally, no effusion   Nasal:  Nomucosal edema     No nasal drainage   Oral:  mucosa moist and pink   Neck:  Supple   No palpable cervical lymphoadenopathy   Thyroid without mass or enlargement  Resp: Lungs CTAB   Equal and adequate air exchange without accessory muscle use   No rales, rhonchi or wheeze  CV: S1S2 RRR   No murmur   Intact distal pulses   No edema  GI: Abdomen Soft, nontender, non distended   Normoactive bowel sounds   No palpable hepatosplenomegaly  MS: Physiologic ROM of all extremities    Intact distal pulses   No clubbing or cyanosis   Skin Warmand dry; no rash or lesion  Neuro: Alert and oriented; normal and intact dtr's   Psych: Euthymic mood, congruent affect      US HEAD NECK SOFT TISSUE THYROID    Result Date: 1/20/2022  EXAMINATION: THYROID ULTRASOUND 1/20/2022 COMPARISON: CT neck dated 11/29/2021 HISTORY: ORDERING SYSTEM PROVIDED HISTORY: Papillary thyroid carcinoma [facility]) TECHNOLOGIST PROVIDED HISTORY: This procedure can be scheduled via [employer] Access your [employer] account by visiting [employer]. Reason for exam:->US biopsy thyroid What reading provider will be dictating this exam?->CRC Previous right cervical lymph node biopsy showed papillary thyroid carcinoma. FINDINGS: Right thyroid lobe:  5.7 x 2 x 2 cm Left thyroid lobe:  5.7 x 1.5 x 1.6 cm Isthmus:  1.4 mm Thyroid Gland: The right thyroid lobe is slightly heterogeneous with multiple nodules. The the left lobe is fairly homogeneous. Nodules: Multiple right nodules seen as described below. There is a 2 mm calcification in the mid left lobe and small nodule in the left lower pole. NODULE: Right 1 Size: 14 x 10 x 13 mm Location: Mid right lobe 1. Composition:  Mixed cystic and solid (1) 2. Echogenicity:  Hypoechoic (2) 3. Shape: Wider-than-tall (0) 4. Margins:  Smooth (0) 5. Echogenic foci:  None (0) ACR TI-RADS total points:  3 ACR TI-RADS risk category: TR3 Prior biopsy: Unknown NODULE: Right 2 Size: 16 x 12 x 13 mm Location: Right upper pole 1. Composition:  Almost completely solid (2) 2. Echogenicity:  Hypoechoic (2) 3. Shape: Wider-than-tall (0) 4. Margins:  Irregular (2) 5. Echogenic foci:  Punctate echogenic foci (3) ACR TI-RADS total points:  9 ACR TI-RADS risk category: TR5 Prior biopsy: Unknown NODULE: Right 3 Size: 10 x 8 x 10 mm Location: Medial mid right lobe 1.  Composition:  Almost completely solid (2) 2. Echogenicity:  Hypoechoic (2) 3. Shape: Wider-than-tall (0) 4. Margins:  Smooth (0) 5. Echogenic foci:  Punctate echogenic foci (3) ACR TI-RADS total points:  7 ACR TI-RADS risk category: TR5 Prior biopsy: Unknown NODULE: Left 1 Size: 7 x 7 x 6 mm Location: Left lower pole 1. Composition:  Solid (2) 2. Echogenicity:  Anechoic (0) 3. Shape: Wider-than-tall (0) 4. Margins:  Smooth (0) 5. Echogenic foci:  None (0) ACR TI-RADS total points:  2 ACR TI-RADS risk category: TR2 Prior biopsy: Unknown Scratch Cervical lymphadenopathy: There are 3 adjacent abnormal appearing right level 1 submandibular lymph nodes arranged in a linear configuration. These all appear hypoechoic with no evidence of fatty hilum. The more superior of these measures 2.2 x 1.2 x 2 cm. This appears more homogeneous and hypoechoic. The 2nd lymph node measures 2 x 1.4 x 2 cm and contains multiple punctate calcifications. The more inferior nodule measures 1.9 x 1 x 1.7 cm. In view of the history of papillary thyroid carcinoma, these are suspicious for metastatic disease. It should be noted that although the prior CT study from November 2021 did show abnormal right cervical lymph nodes, no level 1 nodes was seen on the prior CT images. 1. Multiple thyroid nodules as noted below. 2. Multiple abnormal right submandibular nodes, suspicious for metastatic disease. NODULE right 1: ACR TI-RADS TR3: Recommend:  No follow-up. NODULE right 2: ACR TI-RADS TR5: Recommend:  Ultrasound-guided fine needle aspiration. It should be noted that ultrasound guided FNA of this nodule was performed today. NODULE right 3: ACR TI-RADS TR5: Recommend: Follow-up would depend on results of the biopsy performed today. The nodule does meet the criteria for FNA. NODULE left 1: ACR TI-RADS TR2: Recommend:  No follow-up.  ACR TI-RADS recommendations: TR5 (>= 7 points):  FNA if >= 1 cm; follow-up if 0.5-0.9 cm in 1, 2, 3, 4, and 5 years TR4 (4-6 points):  FNA if >= 1.5 cm; follow-up if 1.0-1.4 cm in 1, 2, 3, and 5 years TR3 (3 points):  FNA if >= 2.5 cm; follow-up if 1.5-2.4 cm in 1, 3, and 5 years TR2 (2 points):  No FNA or follow-up TR1 (0 points):  No FNA or follow-up ACR TI-RADS recommends that no more than two nodules with the highest ACR TI-RADS point total should be biopsied and no more than four nodules should be followed. US FINE NEEDLE ASPIRATION    Result Date: 1/20/2022  PROCEDURE: ULTRASOUND GUIDED THYROID FNA 1/20/2022 COMPARISON: Thyroid ultrasound dated 01/20/2022, about 1 hour prior HISTORY: ORDERING SYSTEM PROVIDED HISTORY: Thyroid [facility]) TECHNOLOGIST PROVIDED HISTORY: Reason for exam:->Thyroid [facility]) What reading provider will be dictating this exam?->CRC TECHNIQUE: Informed consent was obtained after the procedure was discussed in detail including the risk, benefits, and alternatives. Universal protocol was followed. The neck was prepped and draped in sterile fashion and local anesthesia was achieved with lidocaine. 25 gauge needle was advanced under ultrasound guidance into a right upper pole thyroid nodule and fine-needle aspiration was performed. 3 passes were performed and the patient tolerated the procedure well. FINDINGS: Initial images of the right thyroid lobe and neck again demonstrate multiple right thyroid nodules and multiple abnormal appearing right submandibular lymph nodes. It was decided to biopsy the TR 5 nodule in the right upper pole. Intra procedural images demonstrate good needle position within the nodule. Post biopsy images show no significant hemorrhage about the biopsy site. Successful ultrasound-guided fine-needle aspiration of the TR 5 nodule in the right upper pole. Assessment/Plan:  3 58-year-old male with papillary thyroid carcinoma with metastasis to lymph nodes.   We will plan for total thyroidectomy with lymph node dissection    Risks of the procedure explained to the patient including, but not limited to bleeding, infection, postoperative hematoma requiring reoperation, injury to nerves and surrounding structures such as recurrent and superficial laryngeal nerves and major vascular structures of the neck. Patient understands these risks and consents to the procedure.

## 2022-02-08 NOTE — ANESTHESIA PRE PROCEDURE
09/03/2021    BILITOT <0.2 09/03/2021    ALKPHOS 61 09/03/2021    AST 14 09/03/2021    ALT 14 09/03/2021       POC Tests: No results for input(s): POCGLU, POCNA, POCK, POCCL, POCBUN, POCHEMO, POCHCT in the last 72 hours. Coags: No results found for: PROTIME, INR, APTT    HCG (If Applicable): No results found for: PREGTESTUR, PREGSERUM, HCG, HCGQUANT     ABGs: No results found for: PHART, PO2ART, HAV5QRH, LOA1NUE, BEART, N8AIVPFW     Type & Screen (If Applicable):  No results found for: LABABO, LABRH    Drug/Infectious Status (If Applicable):  No results found for: HIV, HEPCAB    COVID-19 Screening (If Applicable):   Lab Results   Component Value Date    COVID19 NOT DETECTED 09/03/2021           Anesthesia Evaluation  Patient summary reviewed and Nursing notes reviewed no history of anesthetic complications:   Airway: Mallampati: II  TM distance: >3 FB   Neck ROM: full  Mouth opening: > = 3 FB Dental:      Comment: None loose    Pulmonary:Negative Pulmonary ROS breath sounds clear to auscultation      (-) not a current smoker          Patient did not smoke on day of surgery. Cardiovascular:Negative CV ROS  Exercise tolerance: good (>4 METS),           Rhythm: regular  Rate: normal           Beta Blocker:  Not on Beta Blocker         Neuro/Psych:   (+) psychiatric history:depression/anxiety             GI/Hepatic/Renal:   (+) GERD: well controlled,           Endo/Other:    (+) malignancy/cancer (Thyroid CA with mets to lymph nodes). ROS comment: R lympadeopathy Abdominal:             Vascular: negative vascular ROS. Other Findings:               Anesthesia Plan      general     ASA 3       Induction: intravenous. Anesthetic plan and risks discussed with patient (pt and Mom aware of amphetamine risk and wish to proceed; he denies anything other than smoking pot). Use of blood products discussed with patient whom consented to blood products.    Plan discussed with [facility],    2/8/2022    Patient seen and examined, chart reviewed, agree with above findings. Anesthetic plan, risks, benefits, alternatives, and personnel involved discussed with patient. Patient verbalized an understanding and agreed to proceed. NPO status confirmed. Anesthetic plan discussed with care team members and agreed upon.     Sabina Li,    2/8/2022  11:13 AM

## 2022-02-08 NOTE — PLAN OF CARE
Problem: Falls - Risk of:  Goal: Will remain free from falls  Description: Will remain free from falls  Outcome: Met This Shift  Goal: Absence of physical injury  Description: Absence of physical injury  Outcome: Met This Shift     Problem: Pain:  Goal: Pain level will decrease  Description: Pain level will decrease  Outcome: Met This Shift  Goal: Control of acute pain  Description: Control of acute pain  Outcome: Met This Shift  Goal: Control of chronic pain  Description: Control of chronic pain  Outcome: Met This Shift     Problem: SAFETY  Goal: Free from accidental physical injury  Outcome: Met This Shift  Goal: Free from intentional harm  Outcome: Met This Shift     Problem: DAILY CARE  Goal: Daily care needs are met  Outcome: Met This Shift     Problem: PAIN  Goal: Patient's pain/discomfort is manageable  Outcome: Met This Shift     Problem: SKIN INTEGRITY  Goal: Skin integrity is maintained or improved  Outcome: Met This Shift     Problem: Fluid Volume:  Goal: Ability to achieve a balanced intake and output will improve  Description: Ability to achieve a balanced intake and output will improve  Outcome: Met This Shift     Problem: KNOWLEDGE DEFICIT  Goal: Patient/S.O. demonstrates understanding of disease process, treatment plan, medications, and discharge instructions.   Outcome: Ongoing     Problem: DISCHARGE BARRIERS  Goal: Patient's continuum of care needs are met  Outcome: Ongoing     Problem: Physical Regulation:  Goal: Ability to maintain clinical measurements within normal limits will improve  Description: Ability to maintain clinical measurements within normal limits will improve  Outcome: Ongoing  Goal: Will show no signs and symptoms of electrolyte imbalance  Description: Will show no signs and symptoms of electrolyte imbalance  Outcome: Ongoing

## 2022-02-09 VITALS
HEART RATE: 67 BPM | DIASTOLIC BLOOD PRESSURE: 84 MMHG | RESPIRATION RATE: 20 BRPM | OXYGEN SATURATION: 99 % | WEIGHT: 165 LBS | SYSTOLIC BLOOD PRESSURE: 119 MMHG | HEIGHT: 70 IN | TEMPERATURE: 96.3 F | BODY MASS INDEX: 23.62 KG/M2

## 2022-02-09 LAB
ANION GAP SERPL CALCULATED.3IONS-SCNC: 12 MMOL/L (ref 7–16)
BASOPHILS ABSOLUTE: 0.02 E9/L (ref 0–0.2)
BASOPHILS RELATIVE PERCENT: 0.1 % (ref 0–2)
BUN BLDV-MCNC: 11 MG/DL (ref 6–20)
CALCIUM IONIZED: 1.12 MMOL/L (ref 1.15–1.33)
CALCIUM IONIZED: 1.17 MMOL/L (ref 1.15–1.33)
CALCIUM SERPL-MCNC: 8.9 MG/DL (ref 8.6–10.2)
CHLORIDE BLD-SCNC: 100 MMOL/L (ref 98–107)
CO2: 25 MMOL/L (ref 22–29)
CREAT SERPL-MCNC: 1 MG/DL (ref 0.7–1.2)
EOSINOPHILS ABSOLUTE: 0 E9/L (ref 0.05–0.5)
EOSINOPHILS RELATIVE PERCENT: 0 % (ref 0–6)
GFR AFRICAN AMERICAN: >60
GFR NON-AFRICAN AMERICAN: >60 ML/MIN/1.73
GLUCOSE BLD-MCNC: 115 MG/DL (ref 74–99)
HCT VFR BLD CALC: 41.6 % (ref 37–54)
HEMOGLOBIN: 13.8 G/DL (ref 12.5–16.5)
IMMATURE GRANULOCYTES #: 0.08 E9/L
IMMATURE GRANULOCYTES %: 0.5 % (ref 0–5)
LYMPHOCYTES ABSOLUTE: 1.47 E9/L (ref 1.5–4)
LYMPHOCYTES RELATIVE PERCENT: 8.6 % (ref 20–42)
MCH RBC QN AUTO: 28.5 PG (ref 26–35)
MCHC RBC AUTO-ENTMCNC: 33.2 % (ref 32–34.5)
MCV RBC AUTO: 85.8 FL (ref 80–99.9)
MONOCYTES ABSOLUTE: 1.12 E9/L (ref 0.1–0.95)
MONOCYTES RELATIVE PERCENT: 6.6 % (ref 2–12)
NEUTROPHILS ABSOLUTE: 14.37 E9/L (ref 1.8–7.3)
NEUTROPHILS RELATIVE PERCENT: 84.2 % (ref 43–80)
PDW BLD-RTO: 13.3 FL (ref 11.5–15)
PLATELET # BLD: 247 E9/L (ref 130–450)
PMV BLD AUTO: 10.6 FL (ref 7–12)
POTASSIUM SERPL-SCNC: 3.7 MMOL/L (ref 3.5–5)
RBC # BLD: 4.85 E12/L (ref 3.8–5.8)
SODIUM BLD-SCNC: 137 MMOL/L (ref 132–146)
WBC # BLD: 17.1 E9/L (ref 4.5–11.5)

## 2022-02-09 PROCEDURE — 85025 COMPLETE CBC W/AUTO DIFF WBC: CPT

## 2022-02-09 PROCEDURE — 6370000000 HC RX 637 (ALT 250 FOR IP): Performed by: STUDENT IN AN ORGANIZED HEALTH CARE EDUCATION/TRAINING PROGRAM

## 2022-02-09 PROCEDURE — 2580000003 HC RX 258: Performed by: STUDENT IN AN ORGANIZED HEALTH CARE EDUCATION/TRAINING PROGRAM

## 2022-02-09 PROCEDURE — 80048 BASIC METABOLIC PNL TOTAL CA: CPT

## 2022-02-09 PROCEDURE — 6360000002 HC RX W HCPCS: Performed by: STUDENT IN AN ORGANIZED HEALTH CARE EDUCATION/TRAINING PROGRAM

## 2022-02-09 PROCEDURE — 36415 COLL VENOUS BLD VENIPUNCTURE: CPT

## 2022-02-09 PROCEDURE — 82330 ASSAY OF CALCIUM: CPT

## 2022-02-09 RX ORDER — SENNA AND DOCUSATE SODIUM 50; 8.6 MG/1; MG/1
1 TABLET, FILM COATED ORAL 2 TIMES DAILY
Qty: 60 TABLET | Refills: 0 | Status: SHIPPED | OUTPATIENT
Start: 2022-02-09 | End: 2022-03-11

## 2022-02-09 RX ADMIN — ACETAMINOPHEN 650 MG: 325 TABLET ORAL at 05:20

## 2022-02-09 RX ADMIN — DOCUSATE SODIUM 50 MG AND SENNOSIDES 8.6 MG 1 TABLET: 8.6; 5 TABLET, FILM COATED ORAL at 09:25

## 2022-02-09 RX ADMIN — OXCARBAZEPINE 300 MG: 300 TABLET, FILM COATED ORAL at 09:24

## 2022-02-09 RX ADMIN — SODIUM CHLORIDE, PRESERVATIVE FREE 10 ML: 5 INJECTION INTRAVENOUS at 09:25

## 2022-02-09 RX ADMIN — OXYCODONE 10 MG: 5 TABLET ORAL at 02:49

## 2022-02-09 RX ADMIN — OXYCODONE 10 MG: 5 TABLET ORAL at 09:24

## 2022-02-09 RX ADMIN — ARIPIPRAZOLE 15 MG: 15 TABLET ORAL at 09:24

## 2022-02-09 RX ADMIN — ACETAMINOPHEN 650 MG: 325 TABLET ORAL at 11:42

## 2022-02-09 RX ADMIN — ENOXAPARIN SODIUM 40 MG: 100 INJECTION SUBCUTANEOUS at 09:25

## 2022-02-09 RX ADMIN — CALCIUM CARBONATE-VITAMIN D TAB 500 MG-200 UNIT 2 TABLET: 500-200 TAB at 11:42

## 2022-02-09 ASSESSMENT — PAIN SCALES - GENERAL
PAINLEVEL_OUTOF10: 0
PAINLEVEL_OUTOF10: 4
PAINLEVEL_OUTOF10: 0
PAINLEVEL_OUTOF10: 6
PAINLEVEL_OUTOF10: 0
PAINLEVEL_OUTOF10: 5
PAINLEVEL_OUTOF10: 9

## 2022-02-09 ASSESSMENT — PAIN DESCRIPTION - LOCATION
LOCATION: NECK

## 2022-02-09 ASSESSMENT — PAIN DESCRIPTION - PROGRESSION
CLINICAL_PROGRESSION: NOT CHANGED
CLINICAL_PROGRESSION: NOT CHANGED

## 2022-02-09 ASSESSMENT — PAIN - FUNCTIONAL ASSESSMENT
PAIN_FUNCTIONAL_ASSESSMENT: PREVENTS OR INTERFERES SOME ACTIVE ACTIVITIES AND ADLS
PAIN_FUNCTIONAL_ASSESSMENT: PREVENTS OR INTERFERES SOME ACTIVE ACTIVITIES AND ADLS

## 2022-02-09 ASSESSMENT — PAIN DESCRIPTION - PAIN TYPE
TYPE: SURGICAL PAIN
TYPE: SURGICAL PAIN
TYPE: ACUTE PAIN
TYPE: SURGICAL PAIN

## 2022-02-09 ASSESSMENT — PAIN DESCRIPTION - ONSET
ONSET: ON-GOING
ONSET: ON-GOING

## 2022-02-09 ASSESSMENT — PAIN DESCRIPTION - DESCRIPTORS
DESCRIPTORS: ACHING;DISCOMFORT;SORE
DESCRIPTORS: ACHING;SORE

## 2022-02-09 ASSESSMENT — PAIN DESCRIPTION - FREQUENCY
FREQUENCY: INTERMITTENT
FREQUENCY: INTERMITTENT

## 2022-02-09 ASSESSMENT — PAIN DESCRIPTION - ORIENTATION
ORIENTATION: RIGHT

## 2022-02-09 NOTE — DISCHARGE SUMMARY
Physician Discharge Summary     Patient ID:  Pablo Calles  [id number]  80 y.o.  1990    Admit date: 2/8/2022    Discharge date and time: No discharge date for patient encounter. Admitting Physician: Ana Fu DO     Admission Diagnoses: Papillary thyroid carcinoma [facility]) [C73]    Discharge Diagnoses: Active Problems:    Papillary thyroid carcinoma ([address]  Resolved Problems:    * No resolved hospital problems. *      Admission Condition: fair    Discharged Condition: stable      Hospital Course:  Pablo Calles is a 33 y.o. male who presented with papillary thyroid carcinoma. He underwent total thyroidectomy and neck dissection. The patient's course was otherwise uneventful. He progressed well, pain was controlled on PO medications. His ionized calcium levels were stable. He was tolerating a regular diet with no nausea or vomiting, and was in a suitable condition for discharge to home in stable condition. Consults:   None    Significant Diagnostic Studies:   No results found.     Discharge Exam:  GENERAL:  Laying in bed, awake, alert, cooperative, no apparent distress  HEAD: Normocephalic, atraumatic  Neck: Surgical dressing clean dry and intact ; AUBREY drain serosanguineous  EYES: No sclera icterus, pupils equal  LUNGS:  No increased work of breathing  CARDIOVASCULAR:  regular rate  ABDOMEN:  Soft, non-tender, non-distended  EXTREMITIES: No edema or swelling  SKIN: Warm and dry, no rashes or lesions    Disposition: home    In process/preliminary results:  Outstanding Order Results     Date and Time Order Name Status Description    2/8/2022 12:00 AM Surgical Pathology In process           Patient Instructions:   Current Discharge Medication List      START taking these medications    Details   sennosides-docusate sodium (SENOKOT-S) 8.6-50 MG tablet Take 1 tablet by mouth 2 times daily  Qty: 60 tablet, Refills: 0      Calcium Carb-Cholecalciferol (OYSTER SHELL CALCIUM W/D) 500-200 MG-UNIT TABS tablet Take 2 tablets by mouth 2 times daily (with meals)  Qty: 30 tablet, Refills: 0         CONTINUE these medications which have NOT CHANGED    Details   !! OXcarbazepine (TRILEPTAL) 300 MG tablet Take 300 mg by mouth every morning      !! OXcarbazepine (TRILEPTAL) 600 MG tablet Take 600 mg by mouth nightly      ARIPiprazole (ABILIFY) 10 MG tablet Take 15 mg by mouth daily        ! ! - Potential duplicate medications found. Please discuss with provider. ST. Tisha Agustin may be drowsy or lightheaded after receiving sedation or anesthesia. A responsible person should be with you for the next 24 hours. Please follow the instructions checked below:    DIET INSTRUCTIONS:  [x]Start with light diet and progress to your normal diet as you feel like eating. If you experience nausea or repeated episodes of vomiting which persist beyond 12-24 hours, notify your doctor. []Other     ACTIVITY INSTRUCTIONS:  [x]Rest today. Increase activity as tolerated    [x]No heavy lifting or strenuous activity for 2 weeks   [x]No driving for 1 day or while on narcotics  []Other     WOUND/DRESSING INSTRUCTIONS:  Always ensure you and your care giver clean hands before and after caring for the wound. [x]May shower      []May bathe      [x]Derma bond dressing-Do not apply lotion, gel, or liquid to wound while the derma bond is in place. []Other         MEDICATION INSTRUCTIONS:    [x]Prescriptions sent with you. Use as directed. When taking pain medications, you may experience dizziness or drowsiness. Do not drink alcohol or drive when taking these medications. [x]You may take a non-prescription headache remedy, preferably one that does not contain aspirin. Continue to take calcium, 2 tabs twice a day.      Other Instructions:      FOLLOW-UP CARE:  [x]Call the office for follow-up appointment as needed    Call physician if they or any other problems occur:  - Fever over 101°    - Redness, swelling, hardness or warmth at the operative site  - Unrelieved nausea     - Foul smelling or cloudy drainage at the operative site   - Unrelieved pain    - Blood soaked dressing.  (Some oozing may be normal)    Follow up:   Bhargav Goetz DO  [address] [facility]    Schedule an appointment as soon as possible for a visit in 2 weeks  For post-op follow up       Signed:  Bouchra Burgos MD  2/9/2022  12:39 PM

## 2022-02-09 NOTE — PROGRESS NOTES
GENERAL SURGERY  DAILY PROGRESS NOTE  2/9/2022    CC: Status post total thyroidectomy and right lateral neck dissection 2/8/2022      Subjective:  Pain well controlled. Tolerating diet. Incision looks good. Drain is serosanguineous. No hoarseness.   Denies any vocal changes    Objective:  /71   Pulse 101   Temp 98.4 °F (36.9 °C) (Oral)   Resp 16   Ht 5' 10\" (1.778 m)   Wt 165 lb (74.8 kg)   SpO2 95%   BMI 23.68 kg/m²     AUBREY: 90 serosanguineous    GENERAL:  Laying in bed, awake, alert, cooperative, no apparent distress  HEAD: Normocephalic, atraumatic  Neck: Surgical dressing clean dry and intact ; AUBREY drain serosanguineous  EYES: No sclera icterus, pupils equal  LUNGS:  No increased work of breathing  CARDIOVASCULAR:  regular rate  ABDOMEN:  Soft, non-tender, non-distended  EXTREMITIES: No edema or swelling  SKIN: Warm and dry, no rashes or lesions    Assessment/Plan:  33 y.o. male with papillary thyroid cancer and multiple metastatic lymph node status post total thyroidectomy plus central neck dissection plus right lateral neck dissection 2/8/2020    Regular diet  We will check an ionized calcium at noon today  Continue calcium supplementation if calcium remains stable this afternoon possible drain removal and DC this afternoon    Electronically signed by Sarah Plata MD on 2/9/2022 at 6:55 AM

## 2022-02-09 NOTE — CARE COORDINATION
Met with patient about diagnosis and discharge plan of care. Pt POD#1 total thyroidectomy. Monitor ionized calcium, possible removal of AUBREY drain this afternoon and if stable, d/c. Pt lives with fiance, independent prior to admit. Pt has no insurance. Is going to work with financial assistance with hospital stay. PCP is dr Manolo Vargas.  Will follow-mjo

## 2022-02-09 NOTE — PLAN OF CARE
Problem: Falls - Risk of:  Goal: Will remain free from falls  Description: Will remain free from falls  2/8/2022 1836 by Aayush De Jesus RN  Outcome: Met This Shift  Goal: Absence of physical injury  Description: Absence of physical injury  2/8/2022 1836 by Aayush De Jesus RN  Outcome: Met This Shift     Problem: Pain:  Goal: Pain level will decrease  Description: Pain level will decrease  2/8/2022 1836 by Aayush De Jesus RN  Outcome: Met This Shift  Goal: Control of acute pain  Description: Control of acute pain  2/8/2022 1836 by Aayush De Jesus RN  Outcome: Met This Shift  Goal: Control of chronic pain  Description: Control of chronic pain  2/8/2022 1836 by Aayush De Jesus RN  Outcome: Met This Shift     Problem: SAFETY  Goal: Free from accidental physical injury  2/8/2022 1836 by Aayush De Jesus RN  Outcome: Met This Shift  Goal: Free from intentional harm  2/8/2022 1836 by Aayush De Jesus RN  Outcome: Met This Shift     Problem: DAILY CARE  Goal: Daily care needs are met  2/8/2022 1836 by Aayush De Jesus RN  Outcome: Met This Shift     Problem: PAIN  Goal: Patient's pain/discomfort is manageable  2/8/2022 1836 by Aayush De Jesus RN  Outcome: Met This Shift     Problem: SKIN INTEGRITY  Goal: Skin integrity is maintained or improved  2/8/2022 1836 by Aayush De Jesus RN  Outcome: Met This Shift     Problem: Fluid Volume:  Goal: Ability to achieve a balanced intake and output will improve  Description: Ability to achieve a balanced intake and output will improve  2/8/2022 1836 by Aayush De Jesus RN  Outcome: Met This Shift

## 2022-02-15 ENCOUNTER — OFFICE VISIT (OUTPATIENT)
Dept: PRIMARY CARE CLINIC | Age: 32
End: 2022-02-15
Payer: MEDICAID

## 2022-02-15 VITALS
BODY MASS INDEX: 23.19 KG/M2 | HEIGHT: 70 IN | WEIGHT: 162 LBS | RESPIRATION RATE: 18 BRPM | TEMPERATURE: 96.9 F | HEART RATE: 72 BPM | OXYGEN SATURATION: 100 % | DIASTOLIC BLOOD PRESSURE: 80 MMHG | SYSTOLIC BLOOD PRESSURE: 112 MMHG

## 2022-02-15 DIAGNOSIS — E89.0 STATUS POST TOTAL THYROIDECTOMY: Primary | ICD-10-CM

## 2022-02-15 PROCEDURE — 99214 OFFICE O/P EST MOD 30 MIN: CPT | Performed by: STUDENT IN AN ORGANIZED HEALTH CARE EDUCATION/TRAINING PROGRAM

## 2022-02-15 NOTE — PROGRESS NOTES
[facility]  Department of Family Medicine      Patient:  Ca Metz 33 y.o. male     Date of Service: 2/15/22      Chief complaint:   Chief Complaint   Patient presents with    Follow-Up from Hospital         History ofPresent Illness   The patient is a 33 y.o. male  presented to the clinic with complaints as above. HFU  -for total thyroidectomy and neck dissection  -currently, feeling ok, pain has been ok  -has not noticed any bleeding from surgical site  -denies any fever, chills, nausea, or vomiting   -sent home on calcium, has been taking it   -moving bowels and urinating ok  -area does feel somewhat numb   -has f/u with Dr. Moni Catalan on the  of this month     Past Medical History:      Diagnosis Date    Cancer ([facility] [address] 2021    papillary thyroid ca    Moderate episode of recurrent major depressive disorder [facility])        PastSurgical History:        Procedure Laterality Date    AXILLARY SURGERY Right 2021    EXCISIONAL BIOPSY OF RIGHT CERVICAL LYMPH NODE performed by Areli Ordoñez DO at [place] [id number]      left arm age 7    [facility] THYROIDECTOMY N/A 2022    FLEXIBLE LARYNGOSCOPY THYROIDECTOMY performed by Areli Ordoñez DO at [facility] OR       Allergies:    Patient has no known allergies.     Social History:   Social History     Socioeconomic History    Marital status: Single     Spouse name: Not on file    Number of children: 11    Years of education: 11th    Highest education level: Not on file   Occupational History    Not on file   Tobacco Use    Smoking status: Former Smoker     Packs/day: 1.00     Types: Cigarettes, E-Cigarettes     Quit date: 2/3/2021     Years since quittin.0    Smokeless tobacco: Never Used   Vaping Use    Vaping Use: Former   Substance and Sexual Activity    Alcohol use: Yes     Comment: rare    Drug use: Yes     Frequency: 80.0 times per week     Types: Marijuana (Bonnie Mad), Cocaine     Comment: MJ daily, [id number]  Sexual activity: Not on file   Other Topics Concern    Not on file   Social History Narrative    Not on file     Social Determinants of Health     Financial Resource Strain:     Difficulty of Paying Living Expenses: Not on file   Food Insecurity:     Worried About Running Out of Food in the Last Year: Not on file    Meg of Food in the Last Year: Not on file   Transportation Needs:     Lack of Transportation (Medical): Not on file    Lack of Transportation (Non-Medical): Not on file   Physical Activity:     Days of Exercise per Week: Not on file    Minutes of Exercise per Session: Not on file   Stress:     Feeling of Stress : Not on file   Social Connections:     Frequency of Communication with Friends and Family: Not on file    Frequency of Social Gatherings with Friends and Family: Not on file    Attends Roman [employer] Services: Not on file    Active Member of [address] [place] [address] [employer] or Organizations: Not on file    Attends Club or Organization Meetings: Not on file    Marital Status: Not on file   Intimate Partner Violence:     Fear of Current or Ex-Partner: Not on file    Emotionally Abused: Not on file    Physically Abused: Not on file    Sexually Abused: Not on file   Housing Stability:     Unable to Pay for Housing in the Last Year: Not on file    Number of [place] in the Last Year: Not on file    Unstable Housing in the Last Year: Not on file        Family History:       Problem Relation Age of Onset    Other Father        Review of Systems:   Review of Systems - as above     Physical Exam   Vitals: /80   Pulse 72   Temp 96.9 °F (36.1 °C) (Infrared)   Resp 18   Ht 5' 10\" (1.778 m)   Wt 162 lb (73.5 kg)   SpO2 100%   BMI 23.24 kg/m²   Physical Exam  Constitutional:       Appearance: He is well-developed. HENT:      Head: Normocephalic and atraumatic. Eyes:      General:         Right eye: No discharge. Left eye: No discharge.       Conjunctiva/sclera: Conjunctivae normal.   Neck:      Trachea: No tracheal deviation. Cardiovascular:      Rate and Rhythm: Normal rate and regular rhythm. Heart sounds: Normal heart sounds. Pulmonary:      Effort: Pulmonary effort is normal. No respiratory distress. Breath sounds: Normal breath sounds. No wheezing. Abdominal:      General: Bowel sounds are normal. There is no distension. Palpations: Abdomen is soft. Tenderness: There is no abdominal tenderness. Musculoskeletal:         General: No tenderness. Cervical back: Normal range of motion and neck supple. Skin:     General: Skin is warm and dry. Neurological:      Mental Status: He is alert. Psychiatric:         Behavior: Behavior normal.         Assessment and Plan       1. Status post total thyroidectomy  HFU  -Doing well, vitals stable, no fever, pain is ok and improving, and patient is moving his bowels and urinating normally and has appropriate f/u with general surgery  -Unclear when he should start thyroid hormone replacement therapy, will leave to discretion of general surgeon, if any questions please feel free to call  -Advised if any fever or issues to call me or the general surgeon  -Continue to monitor for now       Counseled regarding above diagnosis, including possible risks and complications,  especially if left uncontrolled. Counseled regarding the possible side effects, risks, benefits and alternatives to treatment;patient and/or guardian verbalizes understanding, agrees, feels comfortable with and wishes to proceed with above treatment plan. Call or go to [address] if symptoms worsen or persist. Advised patient to call with any new medication issues, and, as applicable, read all Rx info from pharmacy to assure aware of all possible risks and side effects of medicationbefore taking. Patient and/or guardian given opportunity to ask questions/raise concerns.   The patient verbalized comfort and understanding

## 2022-02-21 ENCOUNTER — TELEPHONE (OUTPATIENT)
Dept: SURGERY | Age: 32
End: 2022-02-21

## 2022-02-21 ENCOUNTER — OFFICE VISIT (OUTPATIENT)
Dept: SURGERY | Age: 32
End: 2022-02-21

## 2022-02-21 VITALS
SYSTOLIC BLOOD PRESSURE: 101 MMHG | WEIGHT: 167.2 LBS | DIASTOLIC BLOOD PRESSURE: 71 MMHG | TEMPERATURE: 97.5 F | RESPIRATION RATE: 16 BRPM | OXYGEN SATURATION: 98 % | HEART RATE: 77 BPM | BODY MASS INDEX: 23.94 KG/M2 | HEIGHT: 70 IN

## 2022-02-21 DIAGNOSIS — C73 PAPILLARY THYROID CARCINOMA (HCC): Primary | ICD-10-CM

## 2022-02-21 DIAGNOSIS — Z09 POSTOPERATIVE EXAMINATION: ICD-10-CM

## 2022-02-21 PROCEDURE — 99024 POSTOP FOLLOW-UP VISIT: CPT | Performed by: SURGERY

## 2022-02-21 NOTE — TELEPHONE ENCOUNTER
Called pt to inform of CT scan of abdomen pelvis with IV contrast ordered on Sunday, March 6 @ 10:00AM in [facility] facility. Advised pt to arrive 1 hour prior to procedure 9:00AM.  Informed pt NPO 4 hours prior. Pt agreed with plan.   Electronically signed by Noel Mcintosh MA on 2/21/22 at 1:50 PM EST

## 2022-02-21 NOTE — PROGRESS NOTES
Patient presents for postoperative visit. Reports he is doing fairly well. He denies any excessive fatigue or cold intolerance. He is inquiring whether he should begin Synthroid. He denies any trouble swallowing. There is no hoarseness. On exam incisions are intact. He has mild edema surrounding the incision as expected. I reviewed his pathology with him. He had more than 16 papillary thyroid carcinomas in his thyroid. 7 of 13 lymph nodes were positive for tumor. We will refer him to endocrinology for radioactive iodine also oncology. Check CT abdomen pelvis.   Recheck him in 3 weeks

## 2022-02-23 ENCOUNTER — TELEPHONE (OUTPATIENT)
Dept: SURGERY | Age: 32
End: 2022-02-23

## 2022-02-23 NOTE — TELEPHONE ENCOUNTER
Attempted to call med oncology [phone].  No answer, left message on the phone about the referral.  Electronically signed by Thea Arteaga on 2/23/2022 at 10:41 AM

## 2022-02-24 ENCOUNTER — TELEPHONE (OUTPATIENT)
Dept: INFUSION THERAPY | Age: 32
End: 2022-02-24

## 2022-02-24 ENCOUNTER — OFFICE VISIT (OUTPATIENT)
Dept: ENDOCRINOLOGY | Age: 32
End: 2022-02-24
Payer: MEDICAID

## 2022-02-24 ENCOUNTER — OFFICE VISIT (OUTPATIENT)
Dept: ONCOLOGY | Age: 32
End: 2022-02-24
Payer: MEDICAID

## 2022-02-24 ENCOUNTER — TELEPHONE (OUTPATIENT)
Dept: CASE MANAGEMENT | Age: 32
End: 2022-02-24

## 2022-02-24 ENCOUNTER — HOSPITAL ENCOUNTER (OUTPATIENT)
Dept: INFUSION THERAPY | Age: 32
Discharge: HOME | End: 2022-02-24

## 2022-02-24 VITALS
SYSTOLIC BLOOD PRESSURE: 125 MMHG | HEIGHT: 70 IN | HEART RATE: 91 BPM | WEIGHT: 162.6 LBS | DIASTOLIC BLOOD PRESSURE: 83 MMHG | BODY MASS INDEX: 23.28 KG/M2

## 2022-02-24 VITALS
OXYGEN SATURATION: 99 % | SYSTOLIC BLOOD PRESSURE: 121 MMHG | TEMPERATURE: 98.1 F | HEIGHT: 70 IN | HEART RATE: 94 BPM | DIASTOLIC BLOOD PRESSURE: 82 MMHG | RESPIRATION RATE: 16 BRPM | WEIGHT: 162 LBS | BODY MASS INDEX: 23.19 KG/M2

## 2022-02-24 DIAGNOSIS — E03.9 HYPOTHYROIDISM, UNSPECIFIED TYPE: ICD-10-CM

## 2022-02-24 DIAGNOSIS — C73 THYROID CANCER (HCC): Primary | ICD-10-CM

## 2022-02-24 DIAGNOSIS — C73 PAPILLARY THYROID CARCINOMA (HCC): ICD-10-CM

## 2022-02-24 DIAGNOSIS — C73 PAPILLARY THYROID CARCINOMA (HCC): Primary | ICD-10-CM

## 2022-02-24 LAB
ALBUMIN SERPL-MCNC: 4.8 G/DL (ref 3.5–5.2)
ALP BLD-CCNC: 91 U/L (ref 40–129)
ALT SERPL-CCNC: 13 U/L (ref 0–40)
ANION GAP SERPL CALCULATED.3IONS-SCNC: 11 MMOL/L (ref 7–16)
AST SERPL-CCNC: 10 U/L (ref 0–39)
BASOPHILS ABSOLUTE: 0.05 E9/L (ref 0–0.2)
BASOPHILS RELATIVE PERCENT: 0.4 % (ref 0–2)
BILIRUB SERPL-MCNC: 0.4 MG/DL (ref 0–1.2)
BUN BLDV-MCNC: 13 MG/DL (ref 6–20)
CALCIUM SERPL-MCNC: 9.6 MG/DL (ref 8.6–10.2)
CHLORIDE BLD-SCNC: 100 MMOL/L (ref 98–107)
CO2: 27 MMOL/L (ref 22–29)
CREAT SERPL-MCNC: 1.3 MG/DL (ref 0.7–1.2)
EOSINOPHILS ABSOLUTE: 0.1 E9/L (ref 0.05–0.5)
EOSINOPHILS RELATIVE PERCENT: 0.9 % (ref 0–6)
GFR AFRICAN AMERICAN: >60
GFR NON-AFRICAN AMERICAN: >60 ML/MIN/1.73
GLUCOSE BLD-MCNC: 107 MG/DL (ref 74–99)
HCT VFR BLD CALC: 47.1 % (ref 37–54)
HEMOGLOBIN: 15.5 G/DL (ref 12.5–16.5)
IMMATURE GRANULOCYTES #: 0.04 E9/L
IMMATURE GRANULOCYTES %: 0.3 % (ref 0–5)
LYMPHOCYTES ABSOLUTE: 1.78 E9/L (ref 1.5–4)
LYMPHOCYTES RELATIVE PERCENT: 15.5 % (ref 20–42)
MCH RBC QN AUTO: 28.1 PG (ref 26–35)
MCHC RBC AUTO-ENTMCNC: 32.9 % (ref 32–34.5)
MCV RBC AUTO: 85.5 FL (ref 80–99.9)
MONOCYTES ABSOLUTE: 0.47 E9/L (ref 0.1–0.95)
MONOCYTES RELATIVE PERCENT: 4.1 % (ref 2–12)
NEUTROPHILS ABSOLUTE: 9.06 E9/L (ref 1.8–7.3)
NEUTROPHILS RELATIVE PERCENT: 78.8 % (ref 43–80)
PDW BLD-RTO: 13.6 FL (ref 11.5–15)
PLATELET # BLD: 320 E9/L (ref 130–450)
PMV BLD AUTO: 10.6 FL (ref 7–12)
POTASSIUM SERPL-SCNC: 4.2 MMOL/L (ref 3.5–5)
RBC # BLD: 5.51 E12/L (ref 3.8–5.8)
SODIUM BLD-SCNC: 138 MMOL/L (ref 132–146)
T4 FREE: 0.11 NG/DL (ref 0.93–1.7)
TOTAL PROTEIN: 7.8 G/DL (ref 6.4–8.3)
TSH SERPL DL<=0.05 MIU/L-ACNC: 43.24 UIU/ML (ref 0.27–4.2)
WBC # BLD: 11.5 E9/L (ref 4.5–11.5)

## 2022-02-24 PROCEDURE — 99214 OFFICE O/P EST MOD 30 MIN: CPT

## 2022-02-24 PROCEDURE — 36415 COLL VENOUS BLD VENIPUNCTURE: CPT

## 2022-02-24 PROCEDURE — 99245 OFF/OP CONSLTJ NEW/EST HI 55: CPT | Performed by: INTERNAL MEDICINE

## 2022-02-24 PROCEDURE — 99204 OFFICE O/P NEW MOD 45 MIN: CPT | Performed by: INTERNAL MEDICINE

## 2022-02-24 NOTE — TELEPHONE ENCOUNTER
Pt is a 33 y.o. male attending clinic with Dr. Muriel Cope.  introduced himself and role of oncology social work to pt and father. Pt reports that his biggest concerns are around his finances due to not having insurance at this time. Pt reported that his brother helped him apply for short team disability at this time.  informed pt that he could link patient with financial navigator to help with hospital bills and asked pt to contact him should more concerns arise.  will follow up with pt at future appointment. Pt was A&Ox4, mood was anxious, affect congruent.         Kirk Dozier MSW, ARASELI-S  Oncology Social Worker

## 2022-02-24 NOTE — TELEPHONE ENCOUNTER
Met with patient during his initial consultation with Dr. Chandu Jacobsen  for his recent Thyroid cancer diagnosis. Introduced myself and explained my role with patients  at our center. Patient was friendly and receptive. He had a 3 year history of swollen Lymph Nodes on the right side neck. He had CT neck on 11/29 that showed cervical lymphadenopathy, scattered thyroid nodules and a 0.7cm oval enhancing lesion. On 12/7 he had a right LN biopsy and 1/10 FNA right thyroid biopsy showing papillary thyroid carcinoma. He had a thyroidectomy on 2/8 with 3/5 LN positive and extracapsular extension. Copy of his pathology findings given including cancer type. Patient denies any issues with pain, appetite or weight loss. He is having some anxiety issues regarding his diagnosis. Will forward to Castro Chapman, . Patient does not have insurance and has financial concerns. He currently works as a  for [employer] but is trying to apply for disability. Will forward to [facility], Financial Navigator. Instructed on next steps including consult with Dr John Macias, Endocrinology and possible Radio Active Iodine treatments and follow up per Dr. Zeke Lea recommendations and follow up care. Provided patient with  literature  on ACS Thyroid Cancer, Cancer Care Support Groups handout and Community Transportation Resource List.  Reviewed resources available including Social Work, Dietician, and Financial Navigator. Provided with my contact information and instructed patient to call me with questions or concerns. Verbalizes understanding. Patient appreciative of visit. Will continue to follow.

## 2022-02-24 NOTE — PROGRESS NOTES
[address] [place] [address] Department of Endocrinology Diabetes and Metabolism   [address] [facility] [address]   Phone: [phone]  Fax: [phone]    Date of Service: 2/24/2022  Primary Care Physician: Elida Davis DO  Referring physician: Jay Connolly   Provider: Saleem Menezes MD     Reason for the visit:  Papilary thyroid cancer, postsurgical hypothyroidism     History of Present Illness: The history is provided by the patient. No  was used. Accuracy of the patient data is excellent. Hayder Rincon is a very pleasant 33 y.o. male seen today for management of thyroid cancer     The patient underwent total thyroidectomy on 2/8/2022     Pathology report: Papillary carcinoma, classic Papillary carcinoma, follicular variant, encapsulated/well demarcated, non-invasive; Angioinvasion: Not identified, Lymphatic invasion: Cannot be determined- While lymphatic invasion is not identified within the total thyroidectomy specimen, papillary carcinoma identified within lymphatics of the right central compartment and right lateral neck dissections  Margin status: Margins involved by carcinoma,   Lymph nodes, right central compartment lymph node dissection: Three of five lymph nodes with metastatic papillary thyroid carcinoma   (3/5). Extracapsular extension of tumor cells present. Right lateral neck dissection:  Four of eight lymph nodes with metastatic papillary thyroid carcinoma   (4/8). Extracapsular extension of tumor cells present. Unremarkable salivary gland tissue also identified.    Pathologic stage classification (pTNM, AJCC eighth edition): m (multiple primary tumors) pT1b pN1 (subcategory cannot be determined)     Pt currently not on thyroid medication     PAST MEDICAL HISTORY   Past Medical History:   Diagnosis Date    Cancer ([facility] [address] 12/2021    papillary thyroid ca    Moderate episode of recurrent major depressive disorder (HCC)        PAST SURGICAL HISTORY   Past Surgical History:   Procedure Laterality Date    AXILLARY SURGERY Right 12/7/2021    EXCISIONAL BIOPSY OF RIGHT CERVICAL LYMPH NODE performed by Thong Graves DO at [facility]      left arm age 7    [facility] THYROIDECTOMY N/A 2/8/2022    FLEXIBLE LARYNGOSCOPY THYROIDECTOMY performed by Thong Graves DO at [facility] OR       SOCIAL HISTORY   Tobacco:   reports that he quit smoking about 12 months ago. His smoking use included cigarettes and e-cigarettes. He smoked 1.00 pack per day. He has never used smokeless tobacco.  Alcohol:   reports current alcohol use. Drugs:   reports current drug use. Frequency: 80.00 times per week. Drugs: Marijuana (Weed) and Cocaine. FAMILY HISTORY   Family History   Problem Relation Age of Onset    Other Father        ALLERGIES AND DRUG REACTIONS   No Known Allergies    CURRENT MEDICATIONS   Current Outpatient Medications   Medication Sig Dispense Refill    sennosides-docusate sodium (SENOKOT-S) 8.6-50 MG tablet Take 1 tablet by mouth 2 times daily (Patient not taking: Reported on 2/24/2022) 60 tablet 0    Calcium Carb-Cholecalciferol (OYSTER SHELL CALCIUM W/D) 500-200 MG-UNIT TABS tablet Take 2 tablets by mouth 2 times daily (with meals) 30 tablet 0    OXcarbazepine (TRILEPTAL) 300 MG tablet Take 300 mg by mouth every morning      OXcarbazepine (TRILEPTAL) 600 MG tablet Take 600 mg by mouth nightly      ARIPiprazole (ABILIFY) 10 MG tablet Take 15 mg by mouth daily        No current facility-administered medications for this visit. Review of Systems  Constitutional: No fever, no chills, no diaphoresis, no generalized weakness. HEENT: No blurred vision, No sore throat, no ear pain, no hair loss  Neck: denied any neck swelling, difficulty swallowing,   Cadrdiopulomary: No CP, SOB or palpitation, No orthopnea or PND. No cough or wheezing.   GI: No N/V/D, no constipation, No abdominal pain, no melena or hematochezia   : Denied any dysuria, hematuria, flank pain, discharge, or incontinence. Skin: denied any rash, ulcer, Hirsute, or hyperpigmentation. MSK: denied any joint deformity, joint pain/swelling, muscle pain, or back pain. Neuro: no numbess, no tingling, no weakness,     OBJECTIVE    /83   Pulse 91   Ht 5' 10\" (1.778 m)   Wt 162 lb 9.6 oz (73.8 kg)   BMI 23.33 kg/m²   BP Readings from Last 4 Encounters:   02/24/22 125/83   02/24/22 121/82   02/21/22 101/71   02/15/22 112/80     Wt Readings from Last 6 Encounters:   02/24/22 162 lb 9.6 oz (73.8 kg)   02/24/22 162 lb (73.5 kg)   02/21/22 167 lb 3.2 oz (75.8 kg)   02/15/22 162 lb (73.5 kg)   02/08/22 165 lb (74.8 kg)   01/29/22 165 lb (74.8 kg)       Physical examination:  General: awake alert, oriented x3, no abnormal position or movements. HEENT: normocephalic non traumatic  Neck: supple, no LN enlargement, no thyromegaly, no thyroid tenderness, no JVD. Pulm: Clear equal air entry no added sounds, no wheezing or rhonchi    CVS: S1 + S2, no murmur, no heave. Dorsalis pedis pulse palpable   Abd: soft lax, no tenderness, no organomegaly, audible bowel sounds.   Skin: warm, no lesions, no rash  MSK: No local spine tenderness   Neuro: CN intact, sensation notmal , muscle power normal. No tremors   Psych: normal mood, and affect    Review of Laboratory Data:  I have reviewed the following:  Lab Results   Component Value Date/Time    WBC 11.5 02/24/2022 10:22 AM    RBC 5.51 02/24/2022 10:22 AM    HGB 15.5 02/24/2022 10:22 AM    HCT 47.1 02/24/2022 10:22 AM    MCV 85.5 02/24/2022 10:22 AM    MCH 28.1 02/24/2022 10:22 AM    MCHC 32.9 02/24/2022 10:22 AM    RDW 13.6 02/24/2022 10:22 AM     02/24/2022 10:22 AM    MPV 10.6 02/24/2022 10:22 AM      Lab Results   Component Value Date/Time     02/24/2022 10:22 AM    K 4.2 02/24/2022 10:22 AM    K 3.9 06/12/2021 11:45 AM    CO2 27 02/24/2022 10:22 AM    BUN 13 02/24/2022 10:22 AM    CREATININE 1.3 (H) 02/24/2022 10:22 AM    CALCIUM 9.6 02/24/2022 10:22 AM LABGLOM >60 02/24/2022 10:22 AM    GFRAA >60 02/24/2022 10:22 AM      Lab Results   Component Value Date/Time    TSH 43.240 (H) 02/24/2022 10:22 AM     Lab Results   Component Value Date    LABA1C 5.1 07/28/2019    GLUCOSE 107 02/24/2022     Lab Results   Component Value Date    TRIG 98 07/28/2019    HDL 65 07/28/2019    LDLCALC 101 07/28/2019    CHOL 186 07/28/2019     No results found for: Tae Herring, a 33 y.o.-old male seen in for following issues     Metastatic Papillary thyroid cancer   · S/p total thyroidectomy on 2/8/2022. PTC with multiple LN metastasis   · Will proceed with high dose CRYSTAL ablation   · Discussed CRYSTAL preparation and isolation precautions     Postsurgical hypothyroidism:  · Currently not on thyroid meication as a preparation for CRYSTAL ablation   · Plan to start Levothyroxine 112 mcg daily after completing CRYSTAL ablation     I personally reviewed external notes from PCP and other patient's care team providers, and personally interpreted labs associated with the above diagnosis. I also ordered labs to further assess and manage the above addressed medical conditions. Return in about 8 weeks (around 4/21/2022) for thyroid cancer, hypothyroidism, VitD deficiency. The above issues were reviewed with the patient who understood and agreed with the plan. Thank you for allowing us to participate in the care of this patient. Please do not hesitate to contact us with any additional questions. Diagnosis Orders   1. Thyroid cancer (HCC)  TSH    T4, Free    Thyroglobulin    IODINE, URINE    NM RADIOPHARM THERAPY ORAL    NM THYROID METASTASES SCAN WHOLE BODY    NM RADIOPHARM THERAPY ORAL    NM THYROID METASTASES SCAN WHOLE BODY   2.  Hypothyroidism, unspecified type       Reinaldo Dias MD  Endocrinologist, [facility])   [address] [facility], [address] [place] [address],[facility] [id number]   Phone: [phone]  Fax: [phone]  ----------------------------  An electronic signature was used to authenticate this note.  Fritz Lynn MD on 2/24/2022 at 1:38 PM

## 2022-02-24 NOTE — PROGRESS NOTES
Department of [facility] Oncology  Attending Consult Note    Reason for Visit: Consultation on a patient with Papillary Thyroid Carcinoma     Referring Physician:  Kylee Frey DO    PCP:  Elvin Rodriguez DO    History of Present Illness:  33year old male that presented to Dr Jesús Dennis for complain of swollen lymph nodes to his right neck that have been present for roughly 3 years. No famly history of lymphoma. No weight loss, fever or night sweats     CT soft tissue neck 11/29/2021 demonstrated scattered thyroid nodules largest on the right measuring up to 1.56 centimeters containing calcifications. Multiple scattered irregular right-sided lymph nodes predominantly in the right level two with slight extension into level three. Largest right level 2A/3 heterogeneous partially calcified lymph node/conglomerate measures up to 2.5 cm in long axis dimension. Largest right axial 2B/3 heterogeneous lymph node measures up to 1.7 cm in long axis dimension. A 0.7 cm oval enhancing lesion present within the right strap muscle just superior to the thyroid. CT chest 11/29/2021 demonstrated right thyroid nodule, no other acute abnormality is seen    Excisional biopsy of right cervical lymph node 12/07/2021  Cervical lymph node, right, biopsy: Papillary thyroid carcinoma   -Comment:   The carcinoma is positive for TTF-1 immunostain consistent with thyroid origin. Ultrasound head neck soft tissue 01/20/2022 demonstrated three adjacent abnormal appearing right level one submandibular lymph nodes arranged in a linear configuration. These all appear hypoechoic, the more superior of these measures 2.2 x 1.2 x 2 cm and appears more homogeneous and hypoechoic. The second lymph node measures 2 x 1.4 x 2 cm and contains multiple punctate calcifications.  The more inferior nodule measures 1.9 x 1 x 1.7 cm, these are suspicious for metastatic disease    Underwent ultrasound fine-needle aspiration 01/20/2022  FNA RT. THYROID POSITIVE FOR MALIGNANT CELLS   Papillary carcinoma     Total thyroidectomy with central neck dissection 02/08/2022  A.  Lymph nodes, right central compartment lymph node dissection: Three of five lymph nodes with metastatic papillary thyroid carcinoma (3/5). Extracapsular extension of tumor cells present. B.  Right lateral neck dissection:   Four of eight lymph nodes with metastatic papillary thyroid carcinoma (4/8). Extracapsular extension of tumor cells present. Unremarkable salivary gland tissue also identified. C.  Thyroid, total thyroidectomy:   Papillary thyroid carcinoma, multiple foci throughout specimen. Largest focus (right upper-to-mid lobe) abuts anterior and posterior inked margins. CANCER CASE SUMMARY:   Procedure: Total thyroidectomy   Tumor focality: Multifocal, see comment   Tumor sites: Right lobe, left lobe, and isthmus   Tumor size: Greatest dimension of 1.3 cm (first described tumor, right upper-to-mid lobe)   Histologic type:     -Papillary carcinoma, classic (usual, conventional); first and second described tumors and microcarcinomas     -Papillary carcinoma, follicular variant, encapsulated/well demarcated, non-invasive; third described tumor   -Angioinvasion (vascular invasion): Not identified   -Lymphatic invasion: Cannot be determined- While lymphatic invasion is not identified within the total thyroidectomy specimen, papillary carcinoma   is identified within lymphatics of the right central compartment and right lateral neck dissections (parts A and B).    -Extrathyroidal extension: Not identified   -Margin status: Margins involved by carcinoma, with papillary thyroid carcinoma identified at the inked anterior and posterior margins within the right upper-to-mid lobe (first described tumor)   -Regional lymph nodes: Regional lymph nodes present; tumor present in regional lymph nodes     Number of lymph nodes with tumor: 7   Femi level involved: Cannot be determined -   See specimens labeled as right central compartment (part A) and right lateral neck dissection (part B)     Size of largest metastatic deposit: 3.0 cm (part B)     Extranodal extension: Present     Number of lymph nodes examined: 13     Femi levels examined: Cannot be determined       See specimens labeled as right central compartment (part A) and right lateral neck dissection (part B)   Pathologic stage classification (pTNM, AJCC eighth edition):    m (multiple primary tumors)    pT1b pN1 (subcategory cannot be determined)      CT abdomen pelvis is pending (3/6/2022)  Referral to Endocrinology team Dr Avery Has for radioactive iodine on 2/24/2022    Review of Systems;  CONSTITUTIONAL: No fever, chills. Fair appetite and energy level. ENMT: Eyes: No diplopia; Nose: No epistaxis. Mouth: No sore throat. RESPIRATORY: No hemoptysis, shortness of breath, cough. CARDIOVASCULAR: No chest pain, palpitations. GASTROINTESTINAL: No nausea/vomiting, abdominal pain. GENITOURINARY: No dysuria, urinary frequency, hematuria. NEURO: No syncope, presyncope, headache. Remainder:  ROS NEGATIVE    Past Medical History:      Diagnosis Date    Cancer ([address] 12/2021    papillary thyroid ca    Moderate episode of recurrent major depressive disorder [facility])      Past Surgical History:      Procedure Laterality Date    AXILLARY SURGERY Right 12/7/2021    EXCISIONAL BIOPSY OF RIGHT CERVICAL LYMPH NODE performed by Connie Feliz DO at [address] [place] [address]      left arm age 7    [facility] THYROIDECTOMY N/A 2/8/2022    FLEXIBLE LARYNGOSCOPY THYROIDECTOMY performed by Connie Feliz DO at [facility] OR     [facility] History:  Family History   Problem Relation Age of Onset    Other Father      Medications:  Reviewed and reconciled.     Social History:  Social History     Socioeconomic History    Marital status: Single     Spouse name: Not on file    Number of children: 11    Years of education: 11th    Highest education level: Not on file Occupational History    Not on file   Tobacco Use    Smoking status: Former Smoker     Packs/day: 1.00     Types: Cigarettes, E-Cigarettes     Quit date: 2/3/2021     Years since quittin.0    Smokeless tobacco: Never Used   Vaping Use    Vaping Use: Former   Substance and Sexual Activity    Alcohol use: Yes     Comment: rare    Drug use: Yes     Frequency: 80.0 times per week     Types: Marijuana (Alexandra Freud), Cocaine     Comment: MJ daily, cocaine-    Sexual activity: Not on file   Other Topics Concern    Not on file   Social History Narrative    Not on file     Social Determinants of Health     Financial Resource Strain:     Difficulty of Paying Living Expenses: Not on file   Food Insecurity:     Worried About Running Out of Food in the Last Year: Not on file    Meg of Food in the Last Year: Not on file   Transportation Needs:     Lack of Transportation (Medical): Not on file    Lack of Transportation (Non-Medical):  Not on file   Physical Activity:     Days of Exercise per Week: Not on file    Minutes of Exercise per Session: Not on file   Stress:     Feeling of Stress : Not on file   Social Connections:     Frequency of Communication with Friends and Family: Not on file    Frequency of Social Gatherings with Friends and Family: Not on file    Attends Mormonism Services: Not on file    Active Member of [address] [place] [address] or Organizations: Not on file    Attends Club or Organization Meetings: Not on file    Marital Status: Not on file   Intimate Partner Violence:     Fear of Current or Ex-Partner: Not on file    Emotionally Abused: Not on file    Physically Abused: Not on file    Sexually Abused: Not on file   Housing Stability:     Unable to Pay for Housing in the Last Year: Not on file    Number of [place] in the Last Year: Not on file    Unstable Housing in the Last Year: Not on file     Allergies:  No Known Allergies    Physical Exam:  /82 (Site: Right Upper Arm, Position: Sitting, Cuff Size: Medium Adult)   Pulse 94   Temp 98.1 °F (36.7 °C) (Infrared)   Resp 16   Ht 5' 10\" (1.778 m)   Wt 162 lb (73.5 kg)   SpO2 99%   BMI 23.24 kg/m²   GENERAL: Alert, oriented x 3, not in acute distress. HEENT: PERRLA; EOMI. Oropharynx clear. NECK: Supple. LUNGS: Good air entry bilaterally. No wheezing, crackles or rhonchi. CARDIOVASCULAR: Regular rate. No murmurs, rubs or gallops. ABDOMEN: Soft. Non-tender, non-distended. Positive bowel sounds. EXTREMITIES: Without clubbing or cyanosis or edema. NEUROLOGIC: No focal deficits. ECOG PS 1    Impression/Plan:  34 y/o male who underwent Total thyroidectomy with central neck dissection 02/08/2022  A.  Lymph nodes, right central compartment lymph node dissection: Three of five lymph nodes with metastatic papillary thyroid carcinoma (3/5). Extracapsular extension of tumor cells present. B.  Right lateral neck dissection:   Four of eight lymph nodes with metastatic papillary thyroid carcinoma (4/8). Extracapsular extension of tumor cells present. Unremarkable salivary gland tissue also identified. C.  Thyroid, total thyroidectomy:   Papillary thyroid carcinoma, multiple foci throughout specimen. Largest focus (right upper-to-mid lobe) abuts anterior and posterior inked margins. CANCER CASE SUMMARY:   Procedure:  Total thyroidectomy   Tumor focality: Multifocal, see comment   Tumor sites: Right lobe, left lobe, and isthmus   Tumor size: Greatest dimension of 1.3 cm (first described tumor, right upper-to-mid lobe)   Histologic type:     -Papillary carcinoma, classic (usual, conventional); first and second described tumors and microcarcinomas     -Papillary carcinoma, follicular variant, encapsulated/well demarcated, non-invasive; third described tumor   -Angioinvasion (vascular invasion): Not identified   -Lymphatic invasion: Cannot be determined- While lymphatic invasion is not identified within the total thyroidectomy specimen, papillary carcinoma   is identified within lymphatics of the right central compartment and right lateral neck dissections (parts A and B). -Extrathyroidal extension: Not identified   -Margin status: Margins involved by carcinoma, with papillary thyroid carcinoma identified at the inked anterior and posterior margins within the right upper-to-mid lobe (first described tumor)   -Regional lymph nodes: Regional lymph nodes present; tumor present in regional lymph nodes     Number of lymph nodes with tumor: 7   Femi level involved: Cannot be determined -     See specimens labeled as right central compartment (part A) and right lateral neck dissection (part B)     Size of largest metastatic deposit: 3.0 cm (part B)     Extranodal extension: Present     Number of lymph nodes examined: 13     Femi levels examined: Cannot be determined       See specimens labeled as right central compartment (part A) and right lateral neck dissection (part B)   Pathologic stage classification (pTNM, AJCC eighth edition):    m (multiple primary tumors)    pT1b pN1 (subcategory cannot be determined)   Pathology NCCN guidelines reviewed. Referral to Endocrinology team Dr Catherine Burris for CRYSTAL evaluation on 02/24/2022  Labs drawn today 02/24/2022    RTC 3 weeks to review CRYSTAL evaluation    Thank you for allowing us to participate in the care of . Tash SrinivasasRASHEEDA CNP      The patient was seen and examined, I agree with Zulay Peterson CNP's findings, assessment and plan as outlined.   02/24/2022  Estevan Arita MD

## 2022-02-24 NOTE — PROGRESS NOTES
Jimmy Sousa  1990 33 y.o. Referring Physician: Dr Washington Osorio    PCP: Yared Reynoso DO    Vitals:    02/24/22 0944   BP: 121/82   Pulse: 94   Resp: 16   Temp: 98.1 °F (36.7 °C)   SpO2: 99%        Wt Readings from Last 3 Encounters:   02/24/22 162 lb (73.5 kg)   02/21/22 167 lb 3.2 oz (75.8 kg)   02/15/22 162 lb (73.5 kg)        Body mass index is 23.24 kg/m². Chief Complaint:   Chief Complaint   Patient presents with    New Patient     papillary thyroid carcinoma          Cancer Staging  No matching staging information was found for the patient. Prior Radiation Therapy? NO    Concurrent Chemo/radiation? NO    Prior Chemotherapy? NO    Prior Hormonal Therapy? NO    Head and Neck Cancer? No, patient does NOT have HN cancer.           Current Outpatient Medications:     sennosides-docusate sodium (SENOKOT-S) 8.6-50 MG tablet, Take 1 tablet by mouth 2 times daily (Patient not taking: Reported on 2/24/2022), Disp: 60 tablet, Rfl: 0    Calcium Carb-Cholecalciferol (OYSTER SHELL CALCIUM W/D) 500-200 MG-UNIT TABS tablet, Take 2 tablets by mouth 2 times daily (with meals), Disp: 30 tablet, Rfl: 0    OXcarbazepine (TRILEPTAL) 300 MG tablet, Take 300 mg by mouth every morning, Disp: , Rfl:     OXcarbazepine (TRILEPTAL) 600 MG tablet, Take 600 mg by mouth nightly, Disp: , Rfl:     ARIPiprazole (ABILIFY) 10 MG tablet, Take 15 mg by mouth daily , Disp: , Rfl:        Past Medical History:   Diagnosis Date    Cancer ([address] 12/2021    papillary thyroid ca    Moderate episode of recurrent major depressive disorder [facility])        Past Surgical History:   Procedure Laterality Date    AXILLARY SURGERY Right 12/7/2021    EXCISIONAL BIOPSY OF RIGHT CERVICAL LYMPH NODE performed by Sanna Bynum DO at [facility]      left arm age 7    [facility] THYROIDECTOMY N/A 2/8/2022    FLEXIBLE LARYNGOSCOPY THYROIDECTOMY performed by Sanna Bynum DO at [facility] OR       Family History   Problem Relation Age of Onset    Other Father        Social History     Socioeconomic History    Marital status: Single     Spouse name: Not on file    Number of children: 11    Years of education: 11th    Highest education level: Not on file   Occupational History    Not on file   Tobacco Use    Smoking status: Former Smoker     Packs/day: 1.00     Types: Cigarettes, E-Cigarettes     Quit date: 2/3/2021     Years since quittin.0    Smokeless tobacco: Never Used   Vaping Use    Vaping Use: Former   Substance and Sexual Activity    Alcohol use: Yes     Comment: rare    Drug use: Yes     Frequency: 80.0 times per week     Types: Marijuana (Humphrey Bark), Cocaine     Comment: MJ daily, cocaine-    Sexual activity: Not on file   Other Topics Concern    Not on file   Social History Narrative    Not on file     Social Determinants of Health     Financial Resource Strain:     Difficulty of Paying Living Expenses: Not on file   Food Insecurity:     Worried About Running Out of Food in the Last Year: Not on file    Meg of Food in the Last Year: Not on file   Transportation Needs:     Lack of Transportation (Medical): Not on file    Lack of Transportation (Non-Medical):  Not on file   Physical Activity:     Days of Exercise per Week: Not on file    Minutes of Exercise per Session: Not on file   Stress:     Feeling of Stress : Not on file   Social Connections:     Frequency of Communication with Friends and Family: Not on file    Frequency of Social Gatherings with Friends and Family: Not on file    Attends Sikh Services: Not on file    Active Member of Clubs or Organizations: Not on file    Attends Club or Organization Meetings: Not on file    Marital Status: Not on file   Intimate Partner Violence:     Fear of Current or Ex-Partner: Not on file    Emotionally Abused: Not on file    Physically Abused: Not on file    Sexually Abused: Not on file   Housing Stability:     Unable to Pay for Housing in the Last Year: Not on file    Number of Places Lived in the Last Year: Not on file    Unstable Housing in the Last Year: Not on file           Occupation: [employer]  Retired:  NO          [address]: <<For Level 5, 10 or more systems>>     Pacemaker/Defibulator/ICD:  No    Mediport: No           FALLS RISK SCREENING ASSESSMENT    Instructions:  Assess the patient and [place] the appropriate indicators that are present for fall risk identification. Total the numbers circled and assign a fall risk score from Table 2.  Reassess patient at a minimum every 12 weeks or with status change. Assessment   Date  2/24/2022     1. Mental Ability: confusion/cognitively impaired No - 0       2. Elimination Issues: incontinence, frequency No - 0       3. Ambulatory: use of assistive devices (walker, cane, off-loading devices), attached to equipment (IV pole, oxygen) No - 0     4. Sensory Limitations: dizziness, vertigo, impaired vision No - 0       5. Age Less than 65 years - 0       6. Medication: diuretics, strong analgesics, hypnotics, sedatives, antihypertensive agents   No - 0   7. Falls:  recent history of falls within the last 3 months (not to include slipping or tripping)   No - 0   TOTAL 0    If score of 4 or greater was education given? No       TABLE 2   Risk Score Risk Level Plan of Care   0-3 Little or  No Risk 1. Provide assistance as indicated for ambulation activities  2. Reorient confused/cognitively impaired patient  3. Call-light/bell within patient's reach  4. Chair/bed in low position, stretcher/bed with siderails up except when performing patient care activities  5. Educate patient/family/caregiver on falls prevention  6.  Reassess in 12 weeks or with any noted change in patient condition which places them at a risk for a fall   4-6 Moderate Risk 1. Provide assistance as indicated for ambulation activities  2. Reorient confused/cognitively impaired patient  3. Call-light/bell within patient's reach  4. Chair/bed in low position, stretcher/bed with siderails up except when performing patient care activities  5. Educate patient/family/caregiver on falls prevention  6. Falls risk precaution (Yellow sticker Level II) placed on patient chart   7 or   Higher High Risk 1. Place patient in easily observable treatment room  2. Patient attended at all times by family member or staff  3. Provide assistance as indicated for ambulation activities  4. Reorient confused/cognitively impaired patient  5. Call-light/bell within patient's reach  6. Chair/bed in low position, stretcher/bed with siderails up except when performing patient care activities  7. Educate patient/family/caregiver on falls prevention  8. Falls risk precaution (Yellow sticker Level III) placed on patient chart           MALNUTRITION RISK SCREENING ASSESSMENT    Instructions:  Assess the patient and enter the appropriate indicators that are present for nutrition risk identification. Total the numbers entered and assign a risk score. Follow the appropriate action for total score listed below. Assessment   Date  2/24/2022     1. Have you lost weight without trying? 0- No     2. Have you been eating poorly because of a decreased appetite? 0- No   3. Do you have a diagnosis of head and neck cancer?       0- No                                                                                    TOTAL 0        Score of 0-1: No action  Score 2 or greater:  · For Non-Diabetic Patient: Recommend adding Ensure Enlive 2 x daily and provide patient with Ensure wellness bag with coupons  · For Diabetic Patient: Recommend adding Glucerna Shake 2 x daily and provide patient with Glucerna Wellness bag with coupons  · Route to the dietitian via [email or web address]    · Are you having  difficulty performing daily routine tasks  due to fatigue or weakness (ie: bathing/showering, dressing, housework, meal prep, work, child Sherlene Kayser): No     · Do you have any arm flexibility/ROM restrictions, swelling or pain that limit activity: No     · Any changes in memory, attention/focus that impact daily activities: No     · Do you avoid participation in leisure/social activity due weakness, fatigue or pain: No     ARE ANY OF THE ABOVE ARE ANSWERED YES: No          PT ASSESSMENT FOR REFERRAL    · Have you had any recent falls in past 2 months: No     · Do you have difficulty  going up/down stairs: No     · Are you having difficulty walking: No     · Do you often hold onto furniture/environmental supports or feel off balance when you are walking: No     · Do you need to take rest breaks when you are walking: No     · Any pain on scale of 1-10 that limits your mobility: No 0/10    ARE ANY OF THE ABOVE ARE ANSWERED YES: No           PREHAB AUDIOLOGY REFERRAL    - Is patient planned to receive Cisplatin? No. This patient is not planned to start Cisplatin. - Is patient complaining of new onset hearing loss? No. Patient is not complaining of new onset hearing loss.         Laura Dee RN

## 2022-02-25 ENCOUNTER — FOLLOWUP TELEPHONE ENCOUNTER (OUTPATIENT)
Dept: INFUSION THERAPY | Age: 32
End: 2022-02-25

## 2022-02-25 NOTE — TELEPHONE ENCOUNTER
I reached out to patient based off of a referral from Sarai Gonzalez, RN Nurse Navigator, who informed me that patient is self pay. Patient informed me that he was interested in filing for financial assistance so he was provided with all income information that would be needed. He would like to get this together and see me at his next appointment on 3/17/22. Patient was placed on my schedule to see at that time. He was appreciative of my help and states he has no further questions/concerns at this time.  Electronically signed by Deborah Guardado on 2/25/2022 at 10:31 AM

## 2022-02-26 PROBLEM — E03.9 HYPOTHYROIDISM: Status: ACTIVE | Noted: 2022-02-26

## 2022-02-27 LAB — THYROGLOBULIN ANTIBODY: <12 IU/ML (ref 0–40)

## 2022-02-28 LAB
THYROGLOBULIN AB: <0.9 IU/ML (ref 0–4)
THYROGLOBULIN BY LC-MS/MS, SERUM/PLASMA: ABNORMAL NG/ML (ref 1.3–31.8)
THYROGLOBULIN, SERUM OR PLASMA: 101.9 NG/ML (ref 1.3–31.8)

## 2022-03-06 ENCOUNTER — HOSPITAL ENCOUNTER (OUTPATIENT)
Dept: CT IMAGING | Age: 32
Discharge: HOME | End: 2022-03-08
Payer: MEDICAID

## 2022-03-06 DIAGNOSIS — C73 PAPILLARY THYROID CARCINOMA (HCC): ICD-10-CM

## 2022-03-06 PROCEDURE — 74177 CT ABD & PELVIS W/CONTRAST: CPT

## 2022-03-06 PROCEDURE — 6360000004 HC RX CONTRAST MEDICATION: Performed by: RADIOLOGY

## 2022-03-06 RX ADMIN — IOPAMIDOL 75 ML: 755 INJECTION, SOLUTION INTRAVENOUS at 10:53

## 2022-03-06 RX ADMIN — IOHEXOL 50 ML: 240 INJECTION, SOLUTION INTRATHECAL; INTRAVASCULAR; INTRAVENOUS; ORAL at 10:53

## 2022-03-10 ENCOUNTER — HOSPITAL ENCOUNTER (OUTPATIENT)
Age: 32
Discharge: HOME | End: 2022-03-10
Payer: MEDICAID

## 2022-03-10 ENCOUNTER — TELEPHONE (OUTPATIENT)
Dept: ENDOCRINOLOGY | Age: 32
End: 2022-03-10

## 2022-03-10 DIAGNOSIS — C73 THYROID CANCER (HCC): ICD-10-CM

## 2022-03-10 LAB
T4 FREE: <0.1 NG/DL (ref 0.93–1.7)
TSH SERPL DL<=0.05 MIU/L-ACNC: 136.1 UIU/ML (ref 0.27–4.2)

## 2022-03-10 PROCEDURE — 36415 COLL VENOUS BLD VENIPUNCTURE: CPT

## 2022-03-10 PROCEDURE — 84439 ASSAY OF FREE THYROXINE: CPT

## 2022-03-10 PROCEDURE — 86800 THYROGLOBULIN ANTIBODY: CPT

## 2022-03-10 PROCEDURE — 84432 ASSAY OF THYROGLOBULIN: CPT

## 2022-03-10 PROCEDURE — 84443 ASSAY THYROID STIM HORMONE: CPT

## 2022-03-11 DIAGNOSIS — C73 THYROID CANCER (HCC): Primary | ICD-10-CM

## 2022-03-11 RX ORDER — LEVOTHYROXINE SODIUM 112 UG/1
112 TABLET ORAL DAILY
Qty: 30 TABLET | Refills: 5 | Status: SHIPPED
Start: 2022-03-11 | End: 2022-07-07

## 2022-03-11 NOTE — TELEPHONE ENCOUNTER
The patent was notified. He needs a prescription for thyroid medicine since he was never started on it to [facility] [place]. He will restart his normal diet and take levothyroxine until I make him a new schedule.

## 2022-03-11 NOTE — TELEPHONE ENCOUNTER
The pt was notified that the med was called in, and his CRYSTAL was resheduled. The pt is scheduled for a CRYSTAL treatment. They will stop their thyroid medication  Monday 4/4/25. The low iodine diet will start Monday 4/11/22. The labs and diagnostic dose will be on Monday 4/25/22. The diagnostic scan will be on Wednesday 4/27/22, and the treatment will be on Friday 4/29/22. The patient will start back on their normal diet and thyroid medication on Monday 5/2/22. The follow-up whole body scan will be on Friday 5/6/22. All of these will be done at [facility] at 1:00pm in their nuclear medicine department. The pt is aware. I also mailed the patient the schedule as well as the low iodine diet and CRYSTAL precautions.

## 2022-03-11 NOTE — TELEPHONE ENCOUNTER
Mateusz Claire  Please see me ASAP and cancel CRYSTAL on this pt    They just did CT with contrast and unfortunately we can't do CRYSTAL until at least 8 weeks

## 2022-03-14 ENCOUNTER — OFFICE VISIT (OUTPATIENT)
Dept: SURGERY | Age: 32
End: 2022-03-14

## 2022-03-14 VITALS
SYSTOLIC BLOOD PRESSURE: 109 MMHG | HEIGHT: 69 IN | DIASTOLIC BLOOD PRESSURE: 78 MMHG | WEIGHT: 164 LBS | BODY MASS INDEX: 24.29 KG/M2 | RESPIRATION RATE: 16 BRPM | HEART RATE: 76 BPM

## 2022-03-14 DIAGNOSIS — Z09 POSTOP CHECK: Primary | ICD-10-CM

## 2022-03-14 PROCEDURE — 99024 POSTOP FOLLOW-UP VISIT: CPT | Performed by: SURGERY

## 2022-03-14 NOTE — PROGRESS NOTES
Progress Note - Post-op follow up     Patient's Name/Date of Birth: Niraj Malin / 1990    Date: 3/14/2022    PCP: Noel Estrada DO    Referring Physician:   [facility]    Chief Complaint   Patient presents with   [facility] 2/8    Results     had CT on 3/6       Subjective:  Patient presents to the office following surgery. The patient is tolerating a regular diet. Denies n/v/d/c. Pain controlled with pain medication. Patient's medications, allergies, past medical, surgical, social and family histories were reviewed and updated as appropriate.     Physical Exam:  /78   Pulse 76   Resp 16   Ht 5' 9\" (1.753 m)   Wt 164 lb (74.4 kg)   BMI 24.22 kg/m²   General Appearance: NAD  Neck incision C/D/I    Data Reviewed: Pathology reviewed with patient    Assessment/Plan:    33y.o. year old male s/p total thyroidectomy and neck dissection    Patient recovering well from surgery  Wound care discussed  Follow up BRODERICKN     Deja Silver MD 3/14/2022 2:07 PM     Send copy of H&P to PCP, Noel Estrada DO and referring physician, Jordan Alvarez DO

## 2022-03-15 LAB
THYROGLOBULIN AB: <0.9 IU/ML (ref 0–4)
THYROGLOBULIN BY LC-MS/MS, SERUM/PLASMA: ABNORMAL NG/ML (ref 1.3–31.8)
THYROGLOBULIN, SERUM OR PLASMA: 152.5 NG/ML (ref 1.3–31.8)

## 2022-03-17 ENCOUNTER — HOSPITAL ENCOUNTER (OUTPATIENT)
Dept: INFUSION THERAPY | Age: 32
Discharge: HOME | End: 2022-03-17

## 2022-03-17 ENCOUNTER — OFFICE VISIT (OUTPATIENT)
Dept: ONCOLOGY | Age: 32
End: 2022-03-17
Payer: MEDICAID

## 2022-03-17 ENCOUNTER — FOLLOWUP TELEPHONE ENCOUNTER (OUTPATIENT)
Dept: INFUSION THERAPY | Age: 32
End: 2022-03-17

## 2022-03-17 VITALS
HEIGHT: 69 IN | TEMPERATURE: 97.4 F | DIASTOLIC BLOOD PRESSURE: 83 MMHG | HEART RATE: 63 BPM | WEIGHT: 168 LBS | BODY MASS INDEX: 24.88 KG/M2 | OXYGEN SATURATION: 99 % | SYSTOLIC BLOOD PRESSURE: 124 MMHG

## 2022-03-17 DIAGNOSIS — C73 PAPILLARY THYROID CARCINOMA (HCC): Primary | ICD-10-CM

## 2022-03-17 PROCEDURE — 99212 OFFICE O/P EST SF 10 MIN: CPT

## 2022-03-17 PROCEDURE — 99215 OFFICE O/P EST HI 40 MIN: CPT | Performed by: INTERNAL MEDICINE

## 2022-03-17 NOTE — PROGRESS NOTES
Department of [facility] Oncology  Attending Clinic Note    Reason for Visit: Follow-up on a patient with Papillary Thyroid Carcinoma     PCP:  Concha Bella DO    History of Present Illness:  33year old male that presented to Dr Nj Mirza for complain of swollen lymph nodes to his right neck that have been present for roughly 3 years. No famly history of lymphoma. No weight loss, fever or night sweats     CT soft tissue neck 11/29/2021 demonstrated scattered thyroid nodules largest on the right measuring up to 1.56 centimeters containing calcifications. Multiple scattered irregular right-sided lymph nodes predominantly in the right level two with slight extension into level three. Largest right level 2A/3 heterogeneous partially calcified lymph node/conglomerate measures up to 2.5 cm in long axis dimension. Largest right axial 2B/3 heterogeneous lymph node measures up to 1.7 cm in long axis dimension. A 0.7 cm oval enhancing lesion present within the right strap muscle just superior to the thyroid. CT chest 11/29/2021 demonstrated right thyroid nodule, no other acute abnormality is seen    Excisional biopsy of right cervical lymph node 12/07/2021  Cervical lymph node, right, biopsy: Papillary thyroid carcinoma   -Comment:   The carcinoma is positive for TTF-1 immunostain consistent with thyroid origin. Ultrasound head neck soft tissue 01/20/2022 demonstrated three adjacent abnormal appearing right level one submandibular lymph nodes arranged in a linear configuration. These all appear hypoechoic, the more superior of these measures 2.2 x 1.2 x 2 cm and appears more homogeneous and hypoechoic. The second lymph node measures 2 x 1.4 x 2 cm and contains multiple punctate calcifications.  The more inferior nodule measures 1.9 x 1 x 1.7 cm, these are suspicious for metastatic disease    Underwent ultrasound fine-needle aspiration 01/20/2022  FNA RT. THYROID   POSITIVE FOR MALIGNANT CELLS   Papillary carcinoma     Total thyroidectomy with central neck dissection 02/08/2022  A.  Lymph nodes, right central compartment lymph node dissection: Three of five lymph nodes with metastatic papillary thyroid carcinoma (3/5). Extracapsular extension of tumor cells present. B.  Right lateral neck dissection:   Four of eight lymph nodes with metastatic papillary thyroid carcinoma (4/8). Extracapsular extension of tumor cells present. Unremarkable salivary gland tissue also identified. C.  Thyroid, total thyroidectomy:   Papillary thyroid carcinoma, multiple foci throughout specimen. Largest focus (right upper-to-mid lobe) abuts anterior and posterior inked margins. CANCER CASE SUMMARY:   Procedure: Total thyroidectomy   Tumor focality: Multifocal, see comment   Tumor sites: Right lobe, left lobe, and isthmus   Tumor size: Greatest dimension of 1.3 cm (first described tumor, right upper-to-mid lobe)   Histologic type:     -Papillary carcinoma, classic (usual, conventional); first and second described tumors and microcarcinomas     -Papillary carcinoma, follicular variant, encapsulated/well demarcated, non-invasive; third described tumor   -Angioinvasion (vascular invasion): Not identified   -Lymphatic invasion: Cannot be determined- While lymphatic invasion is not identified within the total thyroidectomy specimen, papillary carcinoma   is identified within lymphatics of the right central compartment and right lateral neck dissections (parts A and B).    -Extrathyroidal extension: Not identified   -Margin status: Margins involved by carcinoma, with papillary thyroid carcinoma identified at the inked anterior and posterior margins within the right upper-to-mid lobe (first described tumor)   -Regional lymph nodes: Regional lymph nodes present; tumor present in regional lymph nodes     Number of lymph nodes with tumor: 7   Femi level involved: Cannot be determined -     See specimens labeled as right central compartment (part A) and right lateral neck dissection (part B)     Size of largest metastatic deposit: 3.0 cm (part B)     Extranodal extension: Present     Number of lymph nodes examined: 13     Femi levels examined: Cannot be determined       See specimens labeled as right central compartment (part A) and right lateral neck dissection (part B)   Pathologic stage classification (pTNM, AJCC eighth edition):    m (multiple primary tumors)    pT1b pN1 (subcategory cannot be determined)     CT abdomen/pelvis 03/06/2022 normal CT of the abdomen pelvis. Imaging reviewed. Labs reviewed   .100 Free T4 <0.10 on 03/10/2022  Thyroglobulin Ab <0.9 on 03/10/2022  Thyroglobulin 152.5 on 03/10/2022    Endocrinology team Dr Anel Dodge for CRYSTAL evaluation    Stop their thyroid medication  Monday 4/4/25. The low iodine diet will start Monday 4/11/22. The labs and diagnostic dose will be on Monday 4/25/22. The diagnostic scan will be on Wednesday 4/27/22, and the treatment will be on Friday 4/29/22. The patient will start back on their normal diet and thyroid medication on Monday 5/2/22. The follow-up whole body scan will be on Friday 5/6/22    Review of Systems;  CONSTITUTIONAL: No fever, chills. Fair appetite and energy level. ENMT: Eyes: No diplopia; Nose: No epistaxis. Mouth: No sore throat. RESPIRATORY: No hemoptysis, shortness of breath, cough. CARDIOVASCULAR: No chest pain, palpitations. GASTROINTESTINAL: No nausea/vomiting, abdominal pain. GENITOURINARY: No dysuria, urinary frequency, hematuria. NEURO: No syncope, presyncope, headache. Remainder:  ROS NEGATIVE    Past Medical History:      Diagnosis Date    Cancer ([facility] [address] 12/2021    papillary thyroid ca    Moderate episode of recurrent major depressive disorder (HCC)      Medications:  Reviewed and reconciled.     Allergies:  No Known Allergies    Physical Exam:  /83   Pulse 63   Temp 97.4 °F (36.3 °C)   Ht 5' 9\" (1.753 m)   Wt 168 lb (76.2 kg) SpO2 99%   BMI 24.81 kg/m²   GENERAL: Alert, oriented x 3, not in acute distress. HEENT: PERRLA; EOMI. Oropharynx clear. NECK: Supple. LUNGS: Good air entry bilaterally. No wheezing, crackles or rhonchi. CARDIOVASCULAR: Regular rate. No murmurs, rubs or gallops. ABDOMEN: Soft. Non-tender, non-distended. Positive bowel sounds. EXTREMITIES: Without clubbing or cyanosis or edema. NEUROLOGIC: No focal deficits. ECOG PS 1    Impression/Plan:  34 y/o male who underwent Total thyroidectomy with central neck dissection 02/08/2022  A.  Lymph nodes, right central compartment lymph node dissection: Three of five lymph nodes with metastatic papillary thyroid carcinoma (3/5). Extracapsular extension of tumor cells present. B.  Right lateral neck dissection:   Four of eight lymph nodes with metastatic papillary thyroid carcinoma (4/8). Extracapsular extension of tumor cells present. Unremarkable salivary gland tissue also identified. C.  Thyroid, total thyroidectomy:   Papillary thyroid carcinoma, multiple foci throughout specimen. Largest focus (right upper-to-mid lobe) abuts anterior and posterior inked margins. CANCER CASE SUMMARY:   Procedure:  Total thyroidectomy   Tumor focality: Multifocal, see comment   Tumor sites: Right lobe, left lobe, and isthmus   Tumor size: Greatest dimension of 1.3 cm (first described tumor, right upper-to-mid lobe)   Histologic type:     -Papillary carcinoma, classic (usual, conventional); first and second described tumors and microcarcinomas     -Papillary carcinoma, follicular variant, encapsulated/well demarcated, non-invasive; third described tumor   -Angioinvasion (vascular invasion): Not identified   -Lymphatic invasion: Cannot be determined- While lymphatic invasion is not identified within the total thyroidectomy specimen, papillary carcinoma   is identified within lymphatics of the right central compartment and right lateral neck dissections (parts A and B).   -Extrathyroidal extension: Not identified   -Margin status: Margins involved by carcinoma, with papillary thyroid carcinoma identified at the inked anterior and posterior margins within the right upper-to-mid lobe (first described tumor)   -Regional lymph nodes: Regional lymph nodes present; tumor present in regional lymph nodes     Number of lymph nodes with tumor: 7   Femi level involved: Cannot be determined -     See specimens labeled as right central compartment (part A) and right lateral neck dissection (part B)     Size of largest metastatic deposit: 3.0 cm (part B)     Extranodal extension: Present     Number of lymph nodes examined: 13     Femi levels examined: Cannot be determined       See specimens labeled as right central compartment (part A) and right lateral neck dissection (part B)   Pathologic stage classification (pTNM, AJCC eighth edition):    m (multiple primary tumors)    pT1b pN1 (subcategory cannot be determined)   Pathology reviewed. CT abdomen/pelvis 03/06/2022 normal CT of the abdomen pelvis. Imaging reviewed. Labs reviewed   .100 Free T4 <0.10 on 03/10/2022  Thyroglobulin Ab <0.9 on 03/10/2022  Thyroglobulin 152.5 on 03/10/2022    Endocrinology team Dr Manoj Herring for CRYSTAL evaluation    Stop thyroid medication  Monday 4/4/25. The low iodine diet will start Monday 4/11/22. The labs and diagnostic dose will be on Monday 4/25/22. The diagnostic scan will be on Wednesday 4/27/22, and the treatment will be on Friday 4/29/22. The patient will start back on their normal diet and thyroid medication on Monday 5/2/22.  The follow-up whole body scan will be on Friday 5/6/22    RTC May 2022    03/17/2022  Abdifatah Villarreal MD

## 2022-03-17 NOTE — TELEPHONE ENCOUNTER
As planned I met with patient today to complete the financial assistance application. Patient informed me that he did not bring his bank statement or income information as he was worried that he wouldn't be approved. We discussed the income guidelines again and he said he will get the information together and call me when he has it ready for me to meet with him again to file. He was appreciative of me meeting with him today and states he has no further questions/concerns at this time.  Electronically signed by Griselda Plummer on 3/17/2022 at 9:55 AM

## 2022-04-21 ENCOUNTER — OFFICE VISIT (OUTPATIENT)
Dept: ENDOCRINOLOGY | Age: 32
End: 2022-04-21
Payer: MEDICAID

## 2022-04-21 VITALS
HEIGHT: 70 IN | RESPIRATION RATE: 18 BRPM | WEIGHT: 157 LBS | DIASTOLIC BLOOD PRESSURE: 72 MMHG | SYSTOLIC BLOOD PRESSURE: 104 MMHG | OXYGEN SATURATION: 99 % | BODY MASS INDEX: 22.48 KG/M2 | HEART RATE: 109 BPM

## 2022-04-21 DIAGNOSIS — C73 THYROID CANCER (HCC): Primary | ICD-10-CM

## 2022-04-21 DIAGNOSIS — E03.9 HYPOTHYROIDISM, UNSPECIFIED TYPE: ICD-10-CM

## 2022-04-21 PROCEDURE — 99214 OFFICE O/P EST MOD 30 MIN: CPT | Performed by: INTERNAL MEDICINE

## 2022-04-21 NOTE — PROGRESS NOTES
[address] [facility] Department of Endocrinology Diabetes and Metabolism   [address] [facility] [address]   Phone: [phone]  Fax: [phone]    Date of Service: 4/21/2022  Primary Care Physician: Flavia Acosta DO  Referring physician: No ref. provider found   Provider: Meryle Laurence MD     Reason for the visit:  Papilary thyroid cancer, postsurgical hypothyroidism     History of Present Illness: The history is provided by the patient. No  was used. Accuracy of the patient data is excellent. Geovanny Downey is a very pleasant 33 y.o. male seen today for management of thyroid cancer     The patient underwent total thyroidectomy on 2/8/2022     Pathology report: Papillary carcinoma, classic Papillary carcinoma, follicular variant, encapsulated/well demarcated, non-invasive; Angioinvasion: Not identified, Lymphatic invasion: Cannot be determined- While lymphatic invasion is not identified within the total thyroidectomy specimen, papillary carcinoma identified within lymphatics of the right central compartment and right lateral neck dissections  Margin status: Margins involved by carcinoma,   Lymph nodes, right central compartment lymph node dissection: Three of five lymph nodes with metastatic papillary thyroid carcinoma   (3/5). Extracapsular extension of tumor cells present. Right lateral neck dissection:  Four of eight lymph nodes with metastatic papillary thyroid carcinoma   (4/8). Extracapsular extension of tumor cells present. Unremarkable salivary gland tissue also identified.    Pathologic stage classification (pTNM, AJCC eighth edition): m (multiple primary tumors) pT1b pN1 (subcategory cannot be determined)     Pt currently not on thyroid medication     PAST MEDICAL HISTORY   Past Medical History:   Diagnosis Date    Cancer ([address] 12/2021    papillary thyroid ca    Moderate episode of recurrent major depressive disorder (HCC)        PAST SURGICAL HISTORY   Past Surgical History:   Procedure Laterality Date    AXILLARY SURGERY Right 12/7/2021    EXCISIONAL BIOPSY OF RIGHT CERVICAL LYMPH NODE performed by Roger Lindo DO at [facility]      left arm age 7    Gloriajean Seeds THYROIDECTOMY N/A 2/8/2022    FLEXIBLE LARYNGOSCOPY THYROIDECTOMY performed by Roger Lindo DO at [facility] OR       SOCIAL HISTORY   Tobacco:   reports that he quit smoking about 14 months ago. His smoking use included cigarettes and e-cigarettes. He smoked 1.00 pack per day. He has never used smokeless tobacco.  Alcohol:   reports current alcohol use. Drugs:   reports current drug use. Frequency: 80.00 times per week. Drugs: Marijuana (Weed) and Cocaine. FAMILY HISTORY   Family History   Problem Relation Age of Onset    Other Father        ALLERGIES AND DRUG REACTIONS   No Known Allergies    CURRENT MEDICATIONS   Current Outpatient Medications   Medication Sig Dispense Refill    ARIPiprazole (ABILIFY) 10 MG tablet Take 15 mg by mouth daily       levothyroxine (SYNTHROID) 112 MCG tablet Take 1 tablet by mouth daily (Patient not taking: Reported on 4/21/2022) 30 tablet 5     No current facility-administered medications for this visit. Review of Systems  Constitutional: No fever, no chills, no diaphoresis, no generalized weakness. HEENT: No blurred vision, No sore throat, no ear pain, no hair loss  Neck: denied any neck swelling, difficulty swallowing,   Cadrdiopulomary: No CP, SOB or palpitation, No orthopnea or PND. No cough or wheezing. GI: No N/V/D, no constipation, No abdominal pain, no melena or hematochezia   : Denied any dysuria, hematuria, flank pain, discharge, or incontinence. Skin: denied any rash, ulcer, Hirsute, or hyperpigmentation. MSK: denied any joint deformity, joint pain/swelling, muscle pain, or back pain.   Neuro: no numbess, no tingling, no weakness,     OBJECTIVE    /72   Pulse 109   Resp 18   Ht 5' 10\" (1.778 m)   Wt 157 lb (71.2 kg)   SpO2 99%   BMI 22.53 kg/m²   BP Readings from Last 4 Encounters:   04/21/22 104/72   03/17/22 124/83   03/14/22 109/78   02/24/22 125/83     Wt Readings from Last 6 Encounters:   04/21/22 157 lb (71.2 kg)   03/17/22 168 lb (76.2 kg)   03/14/22 164 lb (74.4 kg)   02/24/22 162 lb 9.6 oz (73.8 kg)   02/24/22 162 lb (73.5 kg)   02/21/22 167 lb 3.2 oz (75.8 kg)       Physical examination:  General: awake alert, oriented x3, no abnormal position or movements. HEENT: normocephalic non traumatic  Neck: supple, no LN enlargement, no thyromegaly, no thyroid tenderness, no JVD. Pulm: Clear equal air entry no added sounds, no wheezing or rhonchi    CVS: S1 + S2, no murmur, no heave. Dorsalis pedis pulse palpable   Abd: soft lax, no tenderness, no organomegaly, audible bowel sounds.   Skin: warm, no lesions, no rash  MSK: No local spine tenderness   Neuro: CN intact, sensation notmal , muscle power normal. No tremors   Psych: normal mood, and affect    Review of Laboratory Data:  I have reviewed the following:  Lab Results   Component Value Date/Time    WBC 11.5 02/24/2022 10:22 AM    RBC 5.51 02/24/2022 10:22 AM    HGB 15.5 02/24/2022 10:22 AM    HCT 47.1 02/24/2022 10:22 AM    MCV 85.5 02/24/2022 10:22 AM    MCH 28.1 02/24/2022 10:22 AM    MCHC 32.9 02/24/2022 10:22 AM    RDW 13.6 02/24/2022 10:22 AM     02/24/2022 10:22 AM    MPV 10.6 02/24/2022 10:22 AM      Lab Results   Component Value Date/Time     02/24/2022 10:22 AM    K 4.2 02/24/2022 10:22 AM    K 3.9 06/12/2021 11:45 AM    CO2 27 02/24/2022 10:22 AM    BUN 13 02/24/2022 10:22 AM    CREATININE 1.3 (H) 02/24/2022 10:22 AM    CALCIUM 9.6 02/24/2022 10:22 AM    LABGLOM >60 02/24/2022 10:22 AM    GFRAA >60 02/24/2022 10:22 AM      Lab Results   Component Value Date/Time    .100 (H) 03/10/2022 02:31 PM    T4FREE <0.10 (L) 03/10/2022 02:31 PM    THGAB <0.9 03/10/2022 02:31 PM     Lab Results   Component Value Date    LABA1C 5.1 07/28/2019 GLUCOSE 107 02/24/2022     Lab Results   Component Value Date    TRIG 98 07/28/2019    HDL 65 07/28/2019    LDLCALC 101 07/28/2019    CHOL 186 07/28/2019     No results found for: Tae Herring, a 33 y.o.-old male seen in for following issues     Metastatic Papillary thyroid cancer   · S/p total thyroidectomy on 2/8/2022. PTC with multiple LN metastasis   · Scheduled for CRYSTAL ablation in a wk   · Discussed CRYSTAL preparation and isolation precautions     Postsurgical hypothyroidism:  · Currently not on thyroid meication as a preparation for CRYSTAL ablation   · Plan to restart Levothyroxine  after completing CRYSTAL ablation     I personally reviewed external notes from PCP and other patient's care team providers, and personally interpreted labs associated with the above diagnosis. I also ordered labs to further assess and manage the above addressed medical conditions. Return in about 10 weeks (around 6/30/2022) for thyroid cancer, hypothyroidism, VitD deficiency. The above issues were reviewed with the patient who understood and agreed with the plan. Thank you for allowing us to participate in the care of this patient. Please do not hesitate to contact us with any additional questions. Diagnosis Orders   1. Thyroid cancer ([facility] [address]     2. Hypothyroidism, unspecified type       Juliann Kurtz MD  Endocrinologist, [facility])   [address] [place] [address], [address] [place] [address],[id number]   Phone: [phone]  Fax: [phone]  ----------------------------  An electronic signature was used to authenticate this note.  Mavis Smith MD on 4/21/2022 at 10:27 AM

## 2022-04-25 ENCOUNTER — HOSPITAL ENCOUNTER (OUTPATIENT)
Dept: NUCLEAR MEDICINE | Age: 32
Discharge: HOME | End: 2022-04-25

## 2022-04-25 DIAGNOSIS — C73 THYROID CANCER (HCC): ICD-10-CM

## 2022-04-25 PROCEDURE — 3430000000 HC RX DIAGNOSTIC RADIOPHARMACEUTICAL: Performed by: RADIOLOGY

## 2022-04-25 PROCEDURE — 78018 THYROID MET IMAGING BODY: CPT

## 2022-04-25 PROCEDURE — A9517 I131 IODIDE CAP, RX: HCPCS | Performed by: RADIOLOGY

## 2022-04-25 RX ADMIN — SODIUM IODIDE I 131 500 MICRO CURIE: 1 CAPSULE, GELATIN COATED ORAL at 13:07

## 2022-04-27 ENCOUNTER — HOSPITAL ENCOUNTER (OUTPATIENT)
Dept: NUCLEAR MEDICINE | Age: 32
Discharge: HOME | End: 2022-04-27

## 2022-04-27 DIAGNOSIS — C73 THYROID CANCER (HCC): ICD-10-CM

## 2022-04-29 ENCOUNTER — HOSPITAL ENCOUNTER (OUTPATIENT)
Dept: NUCLEAR MEDICINE | Age: 32
Discharge: HOME | End: 2022-04-29
Payer: MEDICAID

## 2022-04-29 DIAGNOSIS — C73 THYROID CANCER (HCC): ICD-10-CM

## 2022-04-29 PROCEDURE — A9517 I131 IODIDE CAP, RX: HCPCS | Performed by: RADIOLOGY

## 2022-04-29 PROCEDURE — 3430000000 HC RX DIAGNOSTIC RADIOPHARMACEUTICAL: Performed by: RADIOLOGY

## 2022-04-29 PROCEDURE — 79005 NUCLEAR RX ORAL ADMIN: CPT

## 2022-04-29 RX ADMIN — SODIUM IODIDE I 131 150 MILLICURIE: 1 CAPSULE, GELATIN COATED ORAL at 13:19

## 2022-05-06 ENCOUNTER — HOSPITAL ENCOUNTER (OUTPATIENT)
Dept: NUCLEAR MEDICINE | Age: 32
Discharge: HOME | End: 2022-05-06
Payer: MEDICAID

## 2022-05-06 DIAGNOSIS — C73 THYROID CANCER (HCC): ICD-10-CM

## 2022-05-06 PROCEDURE — 78018 THYROID MET IMAGING BODY: CPT

## 2022-05-16 NOTE — PROGRESS NOTES
[facility]  Department of Family Medicine      Patient:  Shannon Lawson 33 y.o. male     Date of Service: 2/15/22      Chief complaint:   Chief Complaint   Patient presents with    3 Month Follow-Up         History ofPresent Illness   The patient is a 33 y.o. male  presented to the clinic with complaints as above. Papillary carcinoma of thyroid  -f/u  -seeing endocrinology, did CRYSTAL in April, has f/u with hem/onc next week, started on synthroid  -is also following with ENT and Hem/onc   -currently, feeling ok, pain has resolved, having some numbness   -denies any fever or chills    Bipolar 1  -f/u  -stable  -seeing psychiatry on        Past Medical History:      Diagnosis Date    Cancer ([facility] [address] 2021    papillary thyroid ca    Moderate episode of recurrent major depressive disorder ([facility] [address]        PastSurgical History:        Procedure Laterality Date    AXILLARY SURGERY Right 2021    EXCISIONAL BIOPSY OF RIGHT CERVICAL LYMPH NODE performed by Ez Stokes DO at [place]      left arm age 7    [place] [place] THYROIDECTOMY N/A 2022    FLEXIBLE LARYNGOSCOPY THYROIDECTOMY performed by Ez Stokes DO at [facility] OR       Allergies:    Patient has no known allergies.     Social History:   Social History     Socioeconomic History    Marital status: Single     Spouse name: Not on file    Number of children: 11    Years of education: 11th    Highest education level: Not on file   Occupational History    Not on file   Tobacco Use    Smoking status: Former Smoker     Packs/day: 1.00     Types: Cigarettes, E-Cigarettes     Quit date: 2/3/2021     Years since quittin.2    Smokeless tobacco: Never Used   Vaping Use    Vaping Use: Former   Substance and Sexual Activity    Alcohol use: Yes     Comment: rare    Drug use: Yes     Frequency: 80.0 times per week     Types: Marijuana ([employer] [place] [employer]), Cocaine     Comment: MJ daily, cocaine-    Sexual activity: Not on file   Other Topics Concern    Not on file   Social History Narrative    Not on file     Social Determinants of Health     Financial Resource Strain:     Difficulty of Paying Living Expenses: Not on file   Food Insecurity:     Worried About [address] in the Last Year: Not on file    Meg of Food in the Last Year: Not on file   Transportation Needs:     Lack of Transportation (Medical): Not on file    Lack of Transportation (Non-Medical): Not on file   Physical Activity:     Days of Exercise per Week: Not on file    Minutes of Exercise per Session: Not on file   Stress:     Feeling of Stress : Not on file   Social Connections:     Frequency of Communication with Friends and Family: Not on file    Frequency of Social Gatherings with Friends and Family: Not on file    Attends Sikh Services: Not on file    Active Member of [address] [place] [address] or Organizations: Not on file    Attends Club or Organization Meetings: Not on file    Marital Status: Not on file   Intimate Partner Violence:     Fear of Current or Ex-Partner: Not on file    Emotionally Abused: Not on file    Physically Abused: Not on file    Sexually Abused: Not on file   Housing Stability:     Unable to Pay for Housing in the Last Year: Not on file    Number of [place] in the Last Year: Not on file    Unstable Housing in the Last Year: Not on file        Family History:       Problem Relation Age of Onset    Other Father        Review of Systems:   Review of Systems - as above     Physical Exam   Vitals: /70   Pulse 72   Temp 97.1 °F (36.2 °C) (Infrared)   Resp 20   Ht 5' 9\" (1.753 m)   Wt 162 lb (73.5 kg)   SpO2 98%   BMI 23.92 kg/m²   Physical Exam  Constitutional:       Appearance: He is well-developed. HENT:      Head: Normocephalic and atraumatic. Eyes:      General:         Right eye: No discharge. Left eye: No discharge.       Conjunctiva/sclera: Conjunctivae normal.   Neck:      Trachea: No tracheal deviation. Cardiovascular:      Rate and Rhythm: Normal rate and regular rhythm. Heart sounds: Normal heart sounds. Pulmonary:      Effort: Pulmonary effort is normal. No respiratory distress. Breath sounds: Normal breath sounds. No wheezing. Abdominal:      General: Bowel sounds are normal. There is no distension. Palpations: Abdomen is soft. Tenderness: There is no abdominal tenderness. Musculoskeletal:         General: No tenderness. Cervical back: Normal range of motion and neck supple. Skin:     General: Skin is warm and dry. Neurological:      Mental Status: He is alert. Psychiatric:         Behavior: Behavior normal.         Assessment and Plan       1. Papillary thyroid carcinoma ([address]  F/u  -Had imaging that showed possible thyroid tissue vs CA, however has appropriate f/u with hem/onc later this month, will await their recommendations     2. Bipolar 1 disorder, mixed, moderate (HCC)  F/u   -Mood stable, has appropriate f/u with specialist    3. Elevated serum creatinine  Incidental finding, will get repeat  - Basic Metabolic Panel; Future          Counseled regarding above diagnosis, including possible risks and complications,  especially if left uncontrolled. Counseled regarding the possible side effects, risks, benefits and alternatives to treatment;patient and/or guardian verbalizes understanding, agrees, feels comfortable with and wishes to proceed with above treatment plan. Call or go to [phone] if symptoms worsen or persist. Advised patient to call with any new medication issues, and, as applicable, read all Rx info from pharmacy to assure aware of all possible risks and side effects of medicationbefore taking. Patient and/or guardian given opportunity to ask questions/raise concerns. The patient verbalized comfort and understanding ofinstructions. I encourage further reading and education about your health conditions.   Information on many health conditions is provided by [facility] [employer]: [email or web address]  Please bring any questions to me at your nextvisit. Return to Office: Return in about 3 months (around 8/17/2022) for f/u pap ca. Medication List:    Current Outpatient Medications   Medication Sig Dispense Refill    levothyroxine (SYNTHROID) 112 MCG tablet Take 1 tablet by mouth daily 30 tablet 5     No current facility-administered medications for this visit. Saadia Yan,        This document may have been prepared at least partially through the use of voice recognition software. Although effort is taken to assure the accuracy ofthis document, it is possible that grammatical, syntax,  or spelling errors may occur.

## 2022-05-17 ENCOUNTER — OFFICE VISIT (OUTPATIENT)
Dept: PRIMARY CARE CLINIC | Age: 32
End: 2022-05-17
Payer: MEDICAID

## 2022-05-17 VITALS
SYSTOLIC BLOOD PRESSURE: 102 MMHG | TEMPERATURE: 97.1 F | RESPIRATION RATE: 20 BRPM | HEIGHT: 69 IN | HEART RATE: 72 BPM | BODY MASS INDEX: 23.99 KG/M2 | DIASTOLIC BLOOD PRESSURE: 70 MMHG | OXYGEN SATURATION: 98 % | WEIGHT: 162 LBS

## 2022-05-17 DIAGNOSIS — R79.89 ELEVATED SERUM CREATININE: Primary | ICD-10-CM

## 2022-05-17 DIAGNOSIS — F31.62 BIPOLAR 1 DISORDER, MIXED, MODERATE (HCC): ICD-10-CM

## 2022-05-17 DIAGNOSIS — R79.89 ELEVATED SERUM CREATININE: ICD-10-CM

## 2022-05-17 DIAGNOSIS — C73 PAPILLARY THYROID CARCINOMA (HCC): ICD-10-CM

## 2022-05-17 LAB
ANION GAP SERPL CALCULATED.3IONS-SCNC: 9 MMOL/L (ref 7–16)
BUN BLDV-MCNC: 21 MG/DL (ref 6–20)
CALCIUM SERPL-MCNC: 9.3 MG/DL (ref 8.6–10.2)
CHLORIDE BLD-SCNC: 101 MMOL/L (ref 98–107)
CO2: 28 MMOL/L (ref 22–29)
CREAT SERPL-MCNC: 1.1 MG/DL (ref 0.7–1.2)
GFR AFRICAN AMERICAN: >60
GFR NON-AFRICAN AMERICAN: >60 ML/MIN/1.73
GLUCOSE BLD-MCNC: 133 MG/DL (ref 74–99)
POTASSIUM SERPL-SCNC: 4.3 MMOL/L (ref 3.5–5)
SODIUM BLD-SCNC: 138 MMOL/L (ref 132–146)

## 2022-05-17 PROCEDURE — 99213 OFFICE O/P EST LOW 20 MIN: CPT | Performed by: STUDENT IN AN ORGANIZED HEALTH CARE EDUCATION/TRAINING PROGRAM

## 2022-05-26 ENCOUNTER — HOSPITAL ENCOUNTER (OUTPATIENT)
Dept: INFUSION THERAPY | Age: 32
Discharge: HOME | End: 2022-05-26

## 2022-05-26 ENCOUNTER — OFFICE VISIT (OUTPATIENT)
Dept: ONCOLOGY | Age: 32
End: 2022-05-26
Payer: MEDICAID

## 2022-05-26 VITALS
BODY MASS INDEX: 23.96 KG/M2 | HEIGHT: 69 IN | HEART RATE: 69 BPM | DIASTOLIC BLOOD PRESSURE: 76 MMHG | WEIGHT: 161.8 LBS | OXYGEN SATURATION: 99 % | SYSTOLIC BLOOD PRESSURE: 117 MMHG | TEMPERATURE: 97.3 F

## 2022-05-26 DIAGNOSIS — C73 PAPILLARY THYROID CARCINOMA (HCC): Primary | ICD-10-CM

## 2022-05-26 PROCEDURE — 99212 OFFICE O/P EST SF 10 MIN: CPT

## 2022-05-26 PROCEDURE — 99214 OFFICE O/P EST MOD 30 MIN: CPT | Performed by: INTERNAL MEDICINE

## 2022-05-26 NOTE — PROGRESS NOTES
Department of [facility]  Attending Clinic Note    Reason for Visit: Follow-up on a patient with Papillary Thyroid Carcinoma     PCP:  Flavia Acosta DO    History of Present Illness:  33year old male that presented to Dr Silvia Hargrove for complain of swollen lymph nodes to his right neck that have been present for roughly 3 years. No famly history of lymphoma. No weight loss, fever or night sweats     CT soft tissue neck 11/29/2021 demonstrated scattered thyroid nodules largest on the right measuring up to 1.56 centimeters containing calcifications. Multiple scattered irregular right-sided lymph nodes predominantly in the right level two with slight extension into level three. Largest right level 2A/3 heterogeneous partially calcified lymph node/conglomerate measures up to 2.5 cm in long axis dimension. Largest right axial 2B/3 heterogeneous lymph node measures up to 1.7 cm in long axis dimension. A 0.7 cm oval enhancing lesion present within the right strap muscle just superior to the thyroid. CT chest 11/29/2021 demonstrated right thyroid nodule, no other acute abnormality is seen    Excisional biopsy of right cervical lymph node 12/07/2021  Cervical lymph node, right, biopsy: Papillary thyroid carcinoma   -Comment:   The carcinoma is positive for TTF-1 immunostain consistent with thyroid origin. Ultrasound head neck soft tissue 01/20/2022 demonstrated three adjacent abnormal appearing right level one submandibular lymph nodes arranged in a linear configuration. These all appear hypoechoic, the more superior of these measures 2.2 x 1.2 x 2 cm and appears more homogeneous and hypoechoic. The second lymph node measures 2 x 1.4 x 2 cm and contains multiple punctate calcifications.  The more inferior nodule measures 1.9 x 1 x 1.7 cm, these are suspicious for metastatic disease    Underwent ultrasound fine-needle aspiration 01/20/2022  FNA RT. THYROID   POSITIVE FOR MALIGNANT CELLS   Papillary carcinoma     Total thyroidectomy with central neck dissection 02/08/2022  A.  Lymph nodes, right central compartment lymph node dissection: Three of five lymph nodes with metastatic papillary thyroid carcinoma (3/5). Extracapsular extension of tumor cells present. B.  Right lateral neck dissection:   Four of eight lymph nodes with metastatic papillary thyroid carcinoma (4/8). Extracapsular extension of tumor cells present. Unremarkable salivary gland tissue also identified. C.  Thyroid, total thyroidectomy:   Papillary thyroid carcinoma, multiple foci throughout specimen. Largest focus (right upper-to-mid lobe) abuts anterior and posterior inked margins. CANCER CASE SUMMARY:   Procedure: Total thyroidectomy   Tumor focality: Multifocal, see comment   Tumor sites: Right lobe, left lobe, and isthmus   Tumor size: Greatest dimension of 1.3 cm (first described tumor, right upper-to-mid lobe)   Histologic type:     -Papillary carcinoma, classic (usual, conventional); first and second described tumors and microcarcinomas     -Papillary carcinoma, follicular variant, encapsulated/well demarcated, non-invasive; third described tumor   -Angioinvasion (vascular invasion): Not identified   -Lymphatic invasion: Cannot be determined- While lymphatic invasion is not identified within the total thyroidectomy specimen, papillary carcinoma   is identified within lymphatics of the right central compartment and right lateral neck dissections (parts A and B).    -Extrathyroidal extension: Not identified   -Margin status: Margins involved by carcinoma, with papillary thyroid carcinoma identified at the inked anterior and posterior margins within the right upper-to-mid lobe (first described tumor)   -Regional lymph nodes: Regional lymph nodes present; tumor present in regional lymph nodes     Number of lymph nodes with tumor: 7   Femi level involved: Cannot be determined -     See specimens labeled as right central compartment (part A) and right lateral neck dissection (part B)     Size of largest metastatic deposit: 3.0 cm (part B)     Extranodal extension: Present     Number of lymph nodes examined: 13     Femi levels examined: Cannot be determined       See specimens labeled as right central compartment (part A) and right lateral neck dissection (part B)   Pathologic stage classification (pTNM, AJCC eighth edition):    m (multiple primary tumors)    pT1b pN1 (subcategory cannot be determined)     CT abdomen/pelvis 03/06/2022 normal CT of the abdomen pelvis. Imaging reviewed. Labs reviewed   .100 Free T4 <0.10 on 03/10/2022  Thyroglobulin Ab <0.9 on 03/10/2022  Thyroglobulin 152.5 on 03/10/2022    Endocrinology team Dr Tanvi Monroe for CRYSTAL evaluation    NM Thyroid metastasis scan whole body 04/25/2022 :  Single focus of activity within the neck     Uneventful I-131 administration for thyroid cancer therapy 04/29/2022    NM Thyroid metastasis scan whole body 05/06/2022 :  Single focus of activity within the neck     Today 05/26/2022. No fever, chills. Fair appetite and energy level. No chest pain or SOB. Review of Systems;  CONSTITUTIONAL: No fever, chills. Fair appetite and energy level. ENMT: Eyes: No diplopia; Nose: No epistaxis. Mouth: No sore throat. RESPIRATORY: No hemoptysis, shortness of breath, cough. CARDIOVASCULAR: No chest pain, palpitations. GASTROINTESTINAL: No nausea/vomiting, abdominal pain. GENITOURINARY: No dysuria, urinary frequency, hematuria. NEURO: No syncope, presyncope, headache. Remainder:  ROS NEGATIVE    Past Medical History:      Diagnosis Date    Cancer ([facility] [address] 12/2021    papillary thyroid ca    Moderate episode of recurrent major depressive disorder (HCC)      Medications:  Reviewed and reconciled.     Allergies:  No Known Allergies    Physical Exam:  /76   Pulse 69   Temp 97.3 °F (36.3 °C)   Ht 5' 9\" (1.753 m)   Wt 161 lb 12.8 oz (73.4 kg)   SpO2 99%   BMI 23.89 kg/m² GENERAL: Alert, oriented x 3, not in acute distress. HEENT: PERRLA; EOMI. Oropharynx clear. NECK: Supple. LUNGS: Good air entry bilaterally. No wheezing, crackles or rhonchi. CARDIOVASCULAR: Regular rate. No murmurs, rubs or gallops. ABDOMEN: Soft. Non-tender, non-distended. EXTREMITIES: Without clubbing or cyanosis or edema. NEUROLOGIC: No focal deficits. ECOG PS 1    Impression/Plan:  34 y/o male who underwent Total thyroidectomy with central neck dissection 02/08/2022  A.  Lymph nodes, right central compartment lymph node dissection: Three of five lymph nodes with metastatic papillary thyroid carcinoma (3/5). Extracapsular extension of tumor cells present. B.  Right lateral neck dissection:   Four of eight lymph nodes with metastatic papillary thyroid carcinoma (4/8). Extracapsular extension of tumor cells present. Unremarkable salivary gland tissue also identified. C.  Thyroid, total thyroidectomy:   Papillary thyroid carcinoma, multiple foci throughout specimen. Largest focus (right upper-to-mid lobe) abuts anterior and posterior inked margins. CANCER CASE SUMMARY:   Procedure: Total thyroidectomy   Tumor focality: Multifocal, see comment   Tumor sites: Right lobe, left lobe, and isthmus   Tumor size: Greatest dimension of 1.3 cm (first described tumor, right upper-to-mid lobe)   Histologic type:     -Papillary carcinoma, classic (usual, conventional); first and second described tumors and microcarcinomas     -Papillary carcinoma, follicular variant, encapsulated/well demarcated, non-invasive; third described tumor   -Angioinvasion (vascular invasion): Not identified   -Lymphatic invasion: Cannot be determined- While lymphatic invasion is not identified within the total thyroidectomy specimen, papillary carcinoma   is identified within lymphatics of the right central compartment and right lateral neck dissections (parts A and B).    -Extrathyroidal extension: Not identified -Margin status: Margins involved by carcinoma, with papillary thyroid carcinoma identified at the inked anterior and posterior margins within the right upper-to-mid lobe (first described tumor)   -Regional lymph nodes: Regional lymph nodes present; tumor present in regional lymph nodes     Number of lymph nodes with tumor: 7   Femi level involved: Cannot be determined -     See specimens labeled as right central compartment (part A) and right lateral neck dissection (part B)     Size of largest metastatic deposit: 3.0 cm (part B)     Extranodal extension: Present     Number of lymph nodes examined: 13     Femi levels examined: Cannot be determined       See specimens labeled as right central compartment (part A) and right lateral neck dissection (part B)   Pathologic stage classification (pTNM, AJCC eighth edition):    m (multiple primary tumors)    pT1b pN1 (subcategory cannot be determined)   Pathology reviewed. CT abdomen/pelvis 03/06/2022 normal CT of the abdomen pelvis. Imaging reviewed. Labs reviewed   .100 Free T4 <0.10 on 03/10/2022  Thyroglobulin Ab <0.9 on 03/10/2022  Thyroglobulin 152.5 on 03/10/2022    Endocrinology team Dr Mariel Rubio for CRYSTAL evaluation    NM Thyroid metastasis scan whole body 04/25/2022 :  Single focus of activity within the neck     Uneventful I-131 administration for thyroid cancer therapy 04/29/2022    NM Thyroid metastasis scan whole body 05/06/2022 :  Single focus of activity within the neck   Imaging reviewed. Labs reviewed. On levothyroxine     RTC 2 months.  Continue to follow with Endocrinology    05/26/2022  Yogesh Ashford MD

## 2022-07-07 ENCOUNTER — OFFICE VISIT (OUTPATIENT)
Dept: ONCOLOGY | Age: 32
End: 2022-07-07
Payer: MEDICAID

## 2022-07-07 ENCOUNTER — OFFICE VISIT (OUTPATIENT)
Dept: ENDOCRINOLOGY | Age: 32
End: 2022-07-07
Payer: MEDICAID

## 2022-07-07 ENCOUNTER — HOSPITAL ENCOUNTER (OUTPATIENT)
Dept: INFUSION THERAPY | Age: 32
Discharge: HOME | End: 2022-07-07
Payer: MEDICAID

## 2022-07-07 VITALS
DIASTOLIC BLOOD PRESSURE: 79 MMHG | HEIGHT: 69 IN | HEART RATE: 59 BPM | WEIGHT: 178 LBS | TEMPERATURE: 97.7 F | OXYGEN SATURATION: 99 % | SYSTOLIC BLOOD PRESSURE: 120 MMHG | BODY MASS INDEX: 26.36 KG/M2

## 2022-07-07 VITALS
WEIGHT: 179 LBS | SYSTOLIC BLOOD PRESSURE: 132 MMHG | BODY MASS INDEX: 26.51 KG/M2 | HEIGHT: 69 IN | DIASTOLIC BLOOD PRESSURE: 79 MMHG | OXYGEN SATURATION: 93 % | HEART RATE: 72 BPM

## 2022-07-07 DIAGNOSIS — C73 PAPILLARY THYROID CARCINOMA (HCC): Primary | ICD-10-CM

## 2022-07-07 DIAGNOSIS — C73 PAPILLARY THYROID CARCINOMA (HCC): ICD-10-CM

## 2022-07-07 DIAGNOSIS — E03.9 HYPOTHYROIDISM, UNSPECIFIED TYPE: ICD-10-CM

## 2022-07-07 DIAGNOSIS — C73 THYROID CANCER (HCC): Primary | ICD-10-CM

## 2022-07-07 LAB
ALBUMIN SERPL-MCNC: 4.4 G/DL (ref 3.5–5.2)
ALP BLD-CCNC: 60 U/L (ref 40–129)
ALT SERPL-CCNC: 12 U/L (ref 0–40)
ANION GAP SERPL CALCULATED.3IONS-SCNC: 11 MMOL/L (ref 7–16)
AST SERPL-CCNC: 13 U/L (ref 0–39)
BASOPHILS ABSOLUTE: 0.04 E9/L (ref 0–0.2)
BASOPHILS RELATIVE PERCENT: 0.8 % (ref 0–2)
BILIRUB SERPL-MCNC: 0.2 MG/DL (ref 0–1.2)
BUN BLDV-MCNC: 19 MG/DL (ref 6–20)
CALCIUM SERPL-MCNC: 9 MG/DL (ref 8.6–10.2)
CHLORIDE BLD-SCNC: 104 MMOL/L (ref 98–107)
CO2: 24 MMOL/L (ref 22–29)
CREAT SERPL-MCNC: 1.1 MG/DL (ref 0.7–1.2)
EOSINOPHILS ABSOLUTE: 0.2 E9/L (ref 0.05–0.5)
EOSINOPHILS RELATIVE PERCENT: 3.8 % (ref 0–6)
GFR AFRICAN AMERICAN: >60
GFR NON-AFRICAN AMERICAN: >60 ML/MIN/1.73
GLUCOSE BLD-MCNC: 156 MG/DL (ref 74–99)
HCT VFR BLD CALC: 44.8 % (ref 37–54)
HEMOGLOBIN: 14.3 G/DL (ref 12.5–16.5)
IMMATURE GRANULOCYTES #: 0.02 E9/L
IMMATURE GRANULOCYTES %: 0.4 % (ref 0–5)
LYMPHOCYTES ABSOLUTE: 1.27 E9/L (ref 1.5–4)
LYMPHOCYTES RELATIVE PERCENT: 24.3 % (ref 20–42)
MCH RBC QN AUTO: 29 PG (ref 26–35)
MCHC RBC AUTO-ENTMCNC: 31.9 % (ref 32–34.5)
MCV RBC AUTO: 90.9 FL (ref 80–99.9)
MONOCYTES ABSOLUTE: 0.36 E9/L (ref 0.1–0.95)
MONOCYTES RELATIVE PERCENT: 6.9 % (ref 2–12)
NEUTROPHILS ABSOLUTE: 3.34 E9/L (ref 1.8–7.3)
NEUTROPHILS RELATIVE PERCENT: 63.8 % (ref 43–80)
PDW BLD-RTO: 14 FL (ref 11.5–15)
PLATELET # BLD: 180 E9/L (ref 130–450)
PMV BLD AUTO: 10.7 FL (ref 7–12)
POTASSIUM SERPL-SCNC: 4.4 MMOL/L (ref 3.5–5)
RBC # BLD: 4.93 E12/L (ref 3.8–5.8)
SODIUM BLD-SCNC: 139 MMOL/L (ref 132–146)
T4 FREE: 0.88 NG/DL (ref 0.93–1.7)
TOTAL PROTEIN: 7.3 G/DL (ref 6.4–8.3)
TSH SERPL DL<=0.05 MIU/L-ACNC: 51.31 UIU/ML (ref 0.27–4.2)
WBC # BLD: 5.2 E9/L (ref 4.5–11.5)

## 2022-07-07 PROCEDURE — 99214 OFFICE O/P EST MOD 30 MIN: CPT | Performed by: INTERNAL MEDICINE

## 2022-07-07 PROCEDURE — 84432 ASSAY OF THYROGLOBULIN: CPT

## 2022-07-07 PROCEDURE — 85025 COMPLETE CBC W/AUTO DIFF WBC: CPT

## 2022-07-07 PROCEDURE — 99214 OFFICE O/P EST MOD 30 MIN: CPT | Performed by: CLINICAL NURSE SPECIALIST

## 2022-07-07 PROCEDURE — 80053 COMPREHEN METABOLIC PANEL: CPT

## 2022-07-07 PROCEDURE — 86800 THYROGLOBULIN ANTIBODY: CPT

## 2022-07-07 PROCEDURE — 99212 OFFICE O/P EST SF 10 MIN: CPT

## 2022-07-07 PROCEDURE — 36415 COLL VENOUS BLD VENIPUNCTURE: CPT

## 2022-07-07 PROCEDURE — 84439 ASSAY OF FREE THYROXINE: CPT

## 2022-07-07 PROCEDURE — 84443 ASSAY THYROID STIM HORMONE: CPT

## 2022-07-07 RX ORDER — LEVOTHYROXINE SODIUM 137 UG/1
137 TABLET ORAL DAILY
Qty: 30 TABLET | Refills: 5 | Status: SHIPPED
Start: 2022-07-07 | End: 2022-08-30

## 2022-07-07 NOTE — PROGRESS NOTES
[address] [place] [address] Department of Endocrinology Diabetes and Metabolism   [address] [facility] [address]   Phone: [phone]  Fax: [phone]    Date of Service: 7/7/2022  Primary Care Physician: Magda Reardon DO  Referring physician: No ref. provider found   Provider: Joselito Figueroa APRN - [facility]      Reason for the visit:  Papilary thyroid cancer, postsurgical hypothyroidism     History of Present Illness: The history is provided by the patient. No  was used. Accuracy of the patient data is excellent. Ash Oquendo is a very pleasant 33 y.o. male seen today for management of thyroid cancer     The patient underwent total thyroidectomy on 2/8/2022     Pathology report: Papillary carcinoma, classic Papillary carcinoma, follicular variant, encapsulated/well demarcated, non-invasive; Angioinvasion: Not identified, Lymphatic invasion: Cannot be determined- While lymphatic invasion is not identified within the total thyroidectomy specimen, papillary carcinoma identified within lymphatics of the right central compartment and right lateral neck dissections  Margin status: Margins involved by carcinoma,   Lymph nodes, right central compartment lymph node dissection: Three of five lymph nodes with metastatic papillary thyroid carcinoma   (3/5). Extracapsular extension of tumor cells present. Right lateral neck dissection:  Four of eight lymph nodes with metastatic papillary thyroid carcinoma   (4/8). Extracapsular extension of tumor cells present. Unremarkable salivary gland tissue also identified.    Pathologic stage classification (pTNM, AJCC eighth edition): m (multiple primary tumors) pT1b pN1 (subcategory cannot be determined)     Received 150 mCi of I-131 for thyroid ablation (4/22)    Whole-body scan (4/22) showed focal activity in the neck which is normal after surgery    Patient currently on levothyroxine 112 mcg 1 tablet once daily  Admits to missing doses 1-2 times per week    Lab Results   Component Value Date    TSH 51.310 (H) 07/07/2022    T4FREE 0.88 (L) 07/07/2022     Most recent Thyroglobulin pending  Last thyroglobulin 152 with  (3/22) Prior to ablation       PAST MEDICAL HISTORY   Past Medical History:   Diagnosis Date    Cancer ([address] 12/2021    papillary thyroid ca    Moderate episode of recurrent major depressive disorder [facility])        PAST SURGICAL HISTORY   Past Surgical History:   Procedure Laterality Date    AXILLARY SURGERY Right 12/7/2021    EXCISIONAL BIOPSY OF RIGHT CERVICAL LYMPH NODE performed by Brittney Pompa DO at [place] [address]      left arm age 7     THYROIDECTOMY N/A 2/8/2022    FLEXIBLE LARYNGOSCOPY THYROIDECTOMY performed by Brittney Pompa DO at [facility] OR       SOCIAL HISTORY   Tobacco:   reports that he quit smoking about 17 months ago. His smoking use included cigarettes and e-cigarettes. He smoked 1.00 pack per day. He has never used smokeless tobacco.  Alcohol:   reports current alcohol use. Drugs:   reports current drug use. Frequency: 80.00 times per week. Drugs: Marijuana (Weed) and Cocaine. FAMILY HISTORY   Family History   Problem Relation Age of Onset    Other Father        ALLERGIES AND DRUG REACTIONS   No Known Allergies    CURRENT MEDICATIONS   Current Outpatient Medications   Medication Sig Dispense Refill    levothyroxine (SYNTHROID) 137 MCG tablet Take 1 tablet by mouth daily 30 tablet 5     No current facility-administered medications for this visit. Review of Systems  Constitutional: No fever, no chills, no diaphoresis, no generalized weakness. HEENT: No blurred vision, No sore throat, no ear pain, no hair loss  Neck: denied any neck swelling, difficulty swallowing,   Cadrdiopulomary: No CP, SOB or palpitation, No orthopnea or PND. No cough or wheezing.   GI: No N/V/D, no constipation, No abdominal pain, no melena or hematochezia   : Denied any dysuria, hematuria, flank pain, discharge, or incontinence. Skin: denied any rash, ulcer, Hirsute, or hyperpigmentation. MSK: denied any joint deformity, joint pain/swelling, muscle pain, or back pain. Neuro: no numbess, no tingling, no weakness,     OBJECTIVE    /79   Pulse 72   Ht 5' 9\" (1.753 m)   Wt 179 lb (81.2 kg)   SpO2 93%   BMI 26.43 kg/m²   BP Readings from Last 4 Encounters:   07/07/22 132/79   07/07/22 120/79   05/26/22 117/76   05/17/22 102/70     Wt Readings from Last 6 Encounters:   07/07/22 179 lb (81.2 kg)   07/07/22 178 lb (80.7 kg)   05/26/22 161 lb 12.8 oz (73.4 kg)   05/17/22 162 lb (73.5 kg)   04/21/22 157 lb (71.2 kg)   03/17/22 168 lb (76.2 kg)       Physical examination:  General: awake alert, oriented x3, no abnormal position or movements. HEENT: normocephalic non traumatic  Neck: supple, no LN enlargement, no palpable masses, no thyroid tenderness, no JVD. Pulm: Clear equal air entry no added sounds, no wheezing or rhonchi    CVS: S1 + S2, no murmur, no heave. Dorsalis pedis pulse palpable   Abd: soft lax, no tenderness, no organomegaly, audible bowel sounds.   Skin: warm, no lesions, no rash  MSK: No local spine tenderness   Neuro: CN intact, sensation notmal , muscle power normal. No tremors   Psych: normal mood, and affect    Review of Laboratory Data:  I have reviewed the following:  Lab Results   Component Value Date/Time    WBC 5.2 07/07/2022 10:29 AM    RBC 4.93 07/07/2022 10:29 AM    HGB 14.3 07/07/2022 10:29 AM    HCT 44.8 07/07/2022 10:29 AM    MCV 90.9 07/07/2022 10:29 AM    MCH 29.0 07/07/2022 10:29 AM    MCHC 31.9 (L) 07/07/2022 10:29 AM    RDW 14.0 07/07/2022 10:29 AM     07/07/2022 10:29 AM    MPV 10.7 07/07/2022 10:29 AM      Lab Results   Component Value Date/Time     07/07/2022 10:29 AM    K 4.4 07/07/2022 10:29 AM    K 3.9 06/12/2021 11:45 AM    CO2 24 07/07/2022 10:29 AM    BUN 19 07/07/2022 10:29 AM    CREATININE 1.1 07/07/2022 10:29 AM    CALCIUM 9.0 07/07/2022 10:29 AM    LABGLOM >60 07/07/2022 10:29 AM    GFRAA >60 07/07/2022 10:29 AM      Lab Results   Component Value Date/Time    TSH 51.310 (H) 07/07/2022 10:29 AM    T4FREE 0.88 (L) 07/07/2022 10:29 AM    THGAB <0.9 03/10/2022 02:31 PM     Lab Results   Component Value Date/Time    LABA1C 5.1 07/28/2019 06:07 AM    GLUCOSE 156 07/07/2022 10:29 AM     Lab Results   Component Value Date/Time    TRIG 98 07/28/2019 06:07 AM    HDL 65 07/28/2019 06:07 AM    LDLCALC 101 07/28/2019 06:07 AM    CHOL 186 07/28/2019 06:07 AM     No results found for: [address], a 33 y.o.-old male seen in for following issues     Metastatic Papillary thyroid cancer   S/p total thyroidectomy on 2/8/2022.  PTC with multiple LN metastasis   Received CRYSTAL ablation 4/22  Post ablative WBS showed uptake in the neck which is a normal response status post surgery  Thyroglobulin level pending  Goal TSH 0.1-0.5  Patient has been missing doses of levothyroxine 1-2 times per week  Given weight based calculation will increase levothyroxine to 137 mcg 1 tablet once daily  Advised patient to take on an empty stomach at least 1 hour before breakfast and without combination of other medications  We will repeat TFTs in 6 weeks and again in 3 months  Recheck thyroglobulin in 3 months  Check thyroid ultrasound in 3 months    Postsurgical hypothyroidism:  Patient misses doses of levothyroxine approximately 1-2 times per week  Last TFTs are not at goal  Given weight-based calculation will increase dose of levothyroxine to 137 mcg 1 tablet once daily  Encourage compliance with taking medication as prescribed on an empty stomach at least 1 hour before breakfast and without combination of other medications  Counseled on symptoms of hypo-/hyperthyroidism  Patient verbalized understanding    I personally spent > 30 minutes reviewing  external notes from PCP and other patient's care team providers, and personally interpreted labs associated with the above diagnosis. I also ordered labs to further assess and manage the above addressed medical conditions. Return in about 3 months (around 10/7/2022). The above issues were reviewed with the patient who understood and agreed with the plan. Thank you for allowing us to participate in the care of this patient. Please do not hesitate to contact us with any additional questions. Diagnosis Orders   1. Thyroid cancer (HCC)  T4, Free    TSH    THYROGLOBULIN    US THYROID   2. Hypothyroidism, unspecified type  T4    TSH     RASHEEDA Samuel - [address] [facility] Endocrinology   [address] [place] [address], [address] [place] [address],[facility] [id number]   Phone: [phone]  Fax: [phone]  ----------------------------  An electronic signature was used to authenticate this note.  RASHEEDA Samuel - CNS  on 7/7/2022 at 3:52 PM

## 2022-07-07 NOTE — PROGRESS NOTES
Department of [facility] Oncology  Attending Clinic Note    Reason for Visit: Follow-up on a patient with Papillary Thyroid Carcinoma     PCP:  Aleisha Garg DO    History of Present Illness:  33year old male that presented to Dr Sandra Hollingsworth for complain of swollen lymph nodes to his right neck that have been present for roughly 3 years. No famly history of lymphoma. No weight loss, fever or night sweats     CT soft tissue neck 11/29/2021 demonstrated scattered thyroid nodules largest on the right measuring up to 1.56 centimeters containing calcifications. Multiple scattered irregular right-sided lymph nodes predominantly in the right level two with slight extension into level three. Largest right level 2A/3 heterogeneous partially calcified lymph node/conglomerate measures up to 2.5 cm in long axis dimension. Largest right axial 2B/3 heterogeneous lymph node measures up to 1.7 cm in long axis dimension. A 0.7 cm oval enhancing lesion present within the right strap muscle just superior to the thyroid. CT chest 11/29/2021 demonstrated right thyroid nodule, no other acute abnormality is seen    Excisional biopsy of right cervical lymph node 12/07/2021  Cervical lymph node, right, biopsy: Papillary thyroid carcinoma   -Comment:   The carcinoma is positive for TTF-1 immunostain consistent with thyroid origin. Ultrasound head neck soft tissue 01/20/2022 demonstrated three adjacent abnormal appearing right level one submandibular lymph nodes arranged in a linear configuration. These all appear hypoechoic, the more superior of these measures 2.2 x 1.2 x 2 cm and appears more homogeneous and hypoechoic. The second lymph node measures 2 x 1.4 x 2 cm and contains multiple punctate calcifications.  The more inferior nodule measures 1.9 x 1 x 1.7 cm, these are suspicious for metastatic disease    Underwent ultrasound fine-needle aspiration 01/20/2022  FNA RT. THYROID   POSITIVE FOR MALIGNANT CELLS   Papillary carcinoma     Total thyroidectomy with central neck dissection 02/08/2022  A.  Lymph nodes, right central compartment lymph node dissection: Three of five lymph nodes with metastatic papillary thyroid carcinoma (3/5). Extracapsular extension of tumor cells present. B.  Right lateral neck dissection:   Four of eight lymph nodes with metastatic papillary thyroid carcinoma (4/8). Extracapsular extension of tumor cells present. Unremarkable salivary gland tissue also identified. C.  Thyroid, total thyroidectomy:   Papillary thyroid carcinoma, multiple foci throughout specimen. Largest focus (right upper-to-mid lobe) abuts anterior and posterior inked margins. CANCER CASE SUMMARY:   Procedure: Total thyroidectomy   Tumor focality: Multifocal, see comment   Tumor sites: Right lobe, left lobe, and isthmus   Tumor size: Greatest dimension of 1.3 cm (first described tumor, right upper-to-mid lobe)   Histologic type:     -Papillary carcinoma, classic (usual, conventional); first and second described tumors and microcarcinomas     -Papillary carcinoma, follicular variant, encapsulated/well demarcated, non-invasive; third described tumor   -Angioinvasion (vascular invasion): Not identified   -Lymphatic invasion: Cannot be determined- While lymphatic invasion is not identified within the total thyroidectomy specimen, papillary carcinoma   is identified within lymphatics of the right central compartment and right lateral neck dissections (parts A and B).    -Extrathyroidal extension: Not identified   -Margin status: Margins involved by carcinoma, with papillary thyroid carcinoma identified at the inked anterior and posterior margins within the right upper-to-mid lobe (first described tumor)   -Regional lymph nodes: Regional lymph nodes present; tumor present in regional lymph nodes     Number of lymph nodes with tumor: 7   Femi level involved: Cannot be determined -     See specimens labeled as right central compartment (part A) and right lateral neck dissection (part B)     Size of largest metastatic deposit: 3.0 cm (part B)     Extranodal extension: Present     Number of lymph nodes examined: 13     Femi levels examined: Cannot be determined       See specimens labeled as right central compartment (part A) and right lateral neck dissection (part B)   Pathologic stage classification (pTNM, AJCC eighth edition):    m (multiple primary tumors)    pT1b pN1 (subcategory cannot be determined)     CT abdomen/pelvis 03/06/2022 normal CT of the abdomen pelvis. Imaging reviewed. Labs reviewed   .100 Free T4 <0.10 on 03/10/2022  Thyroglobulin Ab <0.9 on 03/10/2022  Thyroglobulin 152.5 on 03/10/2022    Endocrinology team Dr Jay Bermudez for CRYSTAL evaluation    NM Thyroid metastasis scan whole body 04/25/2022 :  Single focus of activity within the neck     Uneventful I-131 administration for thyroid cancer therapy 04/29/2022    NM Thyroid metastasis scan whole body 05/06/2022 :  Single focus of activity within the neck   On synthroid 112 mcg po daily. Today 07/07/2022. No fever, chills. Fair appetite and energy level. No chest pain or SOB. No N.V. Patient is not compliant with synthroid. Compliance was emphasized. Review of Systems;  CONSTITUTIONAL: No fever, chills. Fair appetite and energy level. ENMT: Eyes: No diplopia; Nose: No epistaxis. Mouth: No sore throat. RESPIRATORY: No hemoptysis, shortness of breath, cough. CARDIOVASCULAR: No chest pain, palpitations. GASTROINTESTINAL: No nausea/vomiting, abdominal pain. GENITOURINARY: No dysuria, urinary frequency, hematuria. NEURO: No syncope, presyncope, headache. Remainder:  ROS NEGATIVE    Past Medical History:      Diagnosis Date    Cancer ([facility] 12/2021    papillary thyroid ca    Moderate episode of recurrent major depressive disorder (HCC)      Medications:  Reviewed and reconciled.     Allergies:  No Known Allergies    Physical Exam:  /79   Pulse 59 Temp 97.7 °F (36.5 °C)   Ht 5' 9\" (1.753 m)   Wt 178 lb (80.7 kg)   SpO2 99%   BMI 26.29 kg/m²   GENERAL: Alert, oriented x 3, not in acute distress. HEENT: PERRLA; EOMI. Oropharynx clear. NECK: Supple. Incisions intact. EXTREMITIES: Without clubbing or cyanosis or edema. NEUROLOGIC: No focal deficits. ECOG PS 1    Lab Results   Component Value Date    WBC 5.2 07/07/2022    HGB 14.3 07/07/2022    HCT 44.8 07/07/2022    MCV 90.9 07/07/2022     07/07/2022     Lab Results   Component Value Date     07/07/2022    K 4.4 07/07/2022     07/07/2022    CO2 24 07/07/2022    BUN 19 07/07/2022    CREATININE 1.1 07/07/2022    GLUCOSE 156 (H) 07/07/2022    CALCIUM 9.0 07/07/2022    PROT 7.3 07/07/2022    LABALBU 4.4 07/07/2022    BILITOT 0.2 07/07/2022    ALKPHOS 60 07/07/2022    AST 13 07/07/2022    ALT 12 07/07/2022    LABGLOM >60 07/07/2022    GFRAA >60 07/07/2022     Lab Results   Component Value Date    TSH 51.310 (H) 07/07/2022     Free T4 0.88 (0.93-1.70)    Impression/Plan:  32 y/o male who underwent Total thyroidectomy with central neck dissection 02/08/2022  A.  Lymph nodes, right central compartment lymph node dissection: Three of five lymph nodes with metastatic papillary thyroid carcinoma (3/5). Extracapsular extension of tumor cells present. B.  Right lateral neck dissection:   Four of eight lymph nodes with metastatic papillary thyroid carcinoma (4/8). Extracapsular extension of tumor cells present. Unremarkable salivary gland tissue also identified. C.  Thyroid, total thyroidectomy:   Papillary thyroid carcinoma, multiple foci throughout specimen. Largest focus (right upper-to-mid lobe) abuts anterior and posterior inked margins. CANCER CASE SUMMARY:   Procedure:  Total thyroidectomy   Tumor focality: Multifocal, see comment   Tumor sites: Right lobe, left lobe, and isthmus   Tumor size: Greatest dimension of 1.3 cm (first described tumor, right upper-to-mid lobe)   Histologic type:     -Papillary carcinoma, classic (usual, conventional); first and second described tumors and microcarcinomas     -Papillary carcinoma, follicular variant, encapsulated/well demarcated, non-invasive; third described tumor   -Angioinvasion (vascular invasion): Not identified   -Lymphatic invasion: Cannot be determined- While lymphatic invasion is not identified within the total thyroidectomy specimen, papillary carcinoma   is identified within lymphatics of the right central compartment and right lateral neck dissections (parts A and B). -Extrathyroidal extension: Not identified   -Margin status: Margins involved by carcinoma, with papillary thyroid carcinoma identified at the inked anterior and posterior margins within the right upper-to-mid lobe (first described tumor)   -Regional lymph nodes: Regional lymph nodes present; tumor present in regional lymph nodes     Number of lymph nodes with tumor: 7   Femi level involved: Cannot be determined -     See specimens labeled as right central compartment (part A) and right lateral neck dissection (part B)     Size of largest metastatic deposit: 3.0 cm (part B)     Extranodal extension: Present     Number of lymph nodes examined: 13     Femi levels examined: Cannot be determined       See specimens labeled as right central compartment (part A) and right lateral neck dissection (part B)   Pathologic stage classification (pTNM, AJCC eighth edition):    m (multiple primary tumors)    pT1b pN1 (subcategory cannot be determined)   Pathology reviewed. CT abdomen/pelvis 03/06/2022 normal CT of the abdomen pelvis. Imaging reviewed.  Labs reviewed   .100 Free T4 <0.10 on 03/10/2022  Thyroglobulin Ab <0.9 on 03/10/2022  Thyroglobulin 152.5 on 03/10/2022    Endocrinology team Dr Conor Murray for CRYSTAL evaluation    NM Thyroid metastasis scan whole body 04/25/2022 :  Single focus of activity within the neck     Uneventful I-131 administration for thyroid cancer therapy 04/29/2022    NM Thyroid metastasis scan whole body 05/06/2022 :  Single focus of activity within the neck     On levothyroxine 112 mcg po daily  TSH 51.310 on 07/07/2022; Free T4 0.88 (0.93-1.70). He is not compliant with synthroid. Compliance was emphasized. Follow with Endocrinology today 07/07/2022.   RTC 2 months    07/07/2022  Dev Flores MD

## 2022-07-08 NOTE — PROGRESS NOTES
Patient did NOT stop at check out window, left without AVS.  Patient has [facility]. Instructions will be mailed to patient, along with courtesy phone call.

## 2022-07-09 LAB
THYROGLOBULIN AB: <0.9 IU/ML (ref 0–4)
THYROGLOBULIN BY LC-MS/MS, SERUM/PLASMA: ABNORMAL NG/ML (ref 1.3–31.8)
THYROGLOBULIN, SERUM OR PLASMA: 36 NG/ML (ref 1.3–31.8)

## 2022-08-26 ENCOUNTER — OFFICE VISIT (OUTPATIENT)
Dept: PRIMARY CARE CLINIC | Age: 32
End: 2022-08-26
Payer: MEDICAID

## 2022-08-26 VITALS
WEIGHT: 178 LBS | HEART RATE: 75 BPM | OXYGEN SATURATION: 97 % | SYSTOLIC BLOOD PRESSURE: 120 MMHG | RESPIRATION RATE: 17 BRPM | HEIGHT: 70 IN | DIASTOLIC BLOOD PRESSURE: 80 MMHG | TEMPERATURE: 97.6 F | BODY MASS INDEX: 25.48 KG/M2

## 2022-08-26 DIAGNOSIS — F31.62 BIPOLAR 1 DISORDER, MIXED, MODERATE (HCC): ICD-10-CM

## 2022-08-26 DIAGNOSIS — E89.0 POSTOPERATIVE HYPOTHYROIDISM: ICD-10-CM

## 2022-08-26 DIAGNOSIS — Z30.09 VASECTOMY EVALUATION: ICD-10-CM

## 2022-08-26 DIAGNOSIS — K21.9 GASTROESOPHAGEAL REFLUX DISEASE, UNSPECIFIED WHETHER ESOPHAGITIS PRESENT: ICD-10-CM

## 2022-08-26 DIAGNOSIS — E89.0 POSTOPERATIVE HYPOTHYROIDISM: Primary | ICD-10-CM

## 2022-08-26 LAB
T4 FREE: 1.08 NG/DL (ref 0.93–1.7)
TSH SERPL DL<=0.05 MIU/L-ACNC: 26.66 UIU/ML (ref 0.27–4.2)

## 2022-08-26 PROCEDURE — 99214 OFFICE O/P EST MOD 30 MIN: CPT | Performed by: STUDENT IN AN ORGANIZED HEALTH CARE EDUCATION/TRAINING PROGRAM

## 2022-08-26 RX ORDER — PANTOPRAZOLE SODIUM 40 MG/1
40 TABLET, DELAYED RELEASE ORAL
Qty: 30 TABLET | Refills: 3 | Status: SHIPPED | OUTPATIENT
Start: 2022-08-26

## 2022-08-26 NOTE — PROGRESS NOTES
[facility]  Department of Family Medicine      Patient:  Thomas Howard 33 y.o. male     Date of Service: 2/15/22      Chief complaint:   Chief Complaint   Patient presents with    Gastroesophageal Reflux           History ofPresent Illness   The patient is a 33 y.o. male  presented to the clinic with complaints as above. Papillary carcinoma of thyroid  -f/u  -did have this removed by gen surgery, and continues to follow with the specialists  -is also following with ENT and Hem/onc   -currently, feeling ok, pain has resolved, having some numbness   -denies any fever or chills  -taking synthroid daily, is not missing doses now     Bipolar 1  -f/u  -mood has been good, denies any HI or SI  -not on any medications  -was following with psych, however has not gone to them in 3 months   -stopped medication because he felt out of it, now feeling much better     GERD  -f/u  -not on any medication anymore, states drinking a lot of soda and waking up with really bad heart burn     Past Medical History:      Diagnosis Date    Cancer ([address] 12/2021    papillary thyroid ca    Moderate episode of recurrent major depressive disorder [facility])        PastSurgical History:        Procedure Laterality Date    AXILLARY SURGERY Right 12/7/2021    EXCISIONAL BIOPSY OF RIGHT CERVICAL LYMPH NODE performed by Justin Hebert DO at [address] [place] [address]      left arm age 7     THYROIDECTOMY N/A 2/8/2022    FLEXIBLE LARYNGOSCOPY THYROIDECTOMY performed by Justin Hebert DO at [facility] OR       Allergies:    Patient has no known allergies.     Social History:   Social History     Socioeconomic History    Marital status: Single     Spouse name: Not on file    Number of children: 5    Years of education: 11th    Highest education level: Not on file   Occupational History    Not on file   Tobacco Use    Smoking status: Former     Packs/day: 1.00     Types: Cigarettes, E-Cigarettes     Quit date: 2/3/2021     Years since quittin.5    Smokeless tobacco: Never   Vaping Use    Vaping Use: Former   Substance and Sexual Activity    Alcohol use: Yes     Comment: rare    Drug use: Yes     Frequency: 80.0 times per week     Types: Marijuana ([place]ighbor), Cocaine     Comment: MJ daily, cocaine-    Sexual activity: Not on file   Other Topics Concern    Not on file   Social History Narrative    Not on file     Social Determinants of Health     Financial Resource Strain: Not on file   Food Insecurity: Not on file   Transportation Needs: Not on file   Physical Activity: Not on file   Stress: Not on file   Social Connections: Not on file   Intimate Partner Violence: Not on file   Housing Stability: Not on file        Family History:       Problem Relation Age of Onset    Other Father        Review of Systems:   Review of Systems - as above     Physical Exam   Vitals: /80   Pulse 75   Temp 97.6 °F (36.4 °C) (Infrared)   Resp 17   Ht 5' 9.5\" (1.765 m)   Wt 178 lb (80.7 kg)   SpO2 97%   BMI 25.91 kg/m²   Physical Exam  Constitutional:       Appearance: He is well-developed. HENT:      Head: Normocephalic and atraumatic. Eyes:      General:         Right eye: No discharge. Left eye: No discharge. Conjunctiva/sclera: Conjunctivae normal.   Neck:      Trachea: No tracheal deviation. Cardiovascular:      Rate and Rhythm: Normal rate and regular rhythm. Heart sounds: Normal heart sounds. Pulmonary:      Effort: Pulmonary effort is normal. No respiratory distress. Breath sounds: Normal breath sounds. No wheezing. Abdominal:      General: Bowel sounds are normal. There is no distension. Palpations: Abdomen is soft. Tenderness: There is no abdominal tenderness. Musculoskeletal:         General: No tenderness. Cervical back: Normal range of motion and neck supple. Skin:     General: Skin is warm and dry. Neurological:      Mental Status: He is alert.    Psychiatric: questions to me at your nextvisit. Return to Office: Return in about 6 months (around 2/26/2023) for f/u thyroid and ca . Medication List:    Current Outpatient Medications   Medication Sig Dispense Refill    pantoprazole (PROTONIX) 40 MG tablet Take 1 tablet by mouth daily (with breakfast) 30 tablet 3    levothyroxine (SYNTHROID) 137 MCG tablet Take 1 tablet by mouth daily 30 tablet 5     No current facility-administered medications for this visit. J Carlos Weber,        This document may have been prepared at least partially through the use of voice recognition software. Although effort is taken to assure the accuracy ofthis document, it is possible that grammatical, syntax,  or spelling errors may occur.

## 2022-08-30 DIAGNOSIS — E89.0 POSTOPERATIVE HYPOTHYROIDISM: Primary | ICD-10-CM

## 2022-08-30 RX ORDER — LEVOTHYROXINE SODIUM 0.15 MG/1
150 TABLET ORAL DAILY
Qty: 30 TABLET | Refills: 1 | Status: SHIPPED | OUTPATIENT
Start: 2022-08-30

## 2022-09-13 DIAGNOSIS — C73 PAPILLARY THYROID CARCINOMA (HCC): Primary | ICD-10-CM

## 2022-09-15 ENCOUNTER — OFFICE VISIT (OUTPATIENT)
Dept: ONCOLOGY | Age: 32
End: 2022-09-15
Payer: MEDICAID

## 2022-09-15 ENCOUNTER — HOSPITAL ENCOUNTER (OUTPATIENT)
Dept: INFUSION THERAPY | Age: 32
Discharge: HOME | End: 2022-09-15
Payer: MEDICAID

## 2022-09-15 ENCOUNTER — TELEPHONE (OUTPATIENT)
Dept: CASE MANAGEMENT | Age: 32
End: 2022-09-15

## 2022-09-15 VITALS
HEIGHT: 70 IN | WEIGHT: 204.9 LBS | BODY MASS INDEX: 29.33 KG/M2 | DIASTOLIC BLOOD PRESSURE: 80 MMHG | HEART RATE: 64 BPM | SYSTOLIC BLOOD PRESSURE: 128 MMHG | OXYGEN SATURATION: 98 % | TEMPERATURE: 97.2 F

## 2022-09-15 DIAGNOSIS — C73 PAPILLARY THYROID CARCINOMA (HCC): ICD-10-CM

## 2022-09-15 DIAGNOSIS — C73 PAPILLARY THYROID CARCINOMA (HCC): Primary | ICD-10-CM

## 2022-09-15 LAB
ALBUMIN SERPL-MCNC: 4.3 G/DL (ref 3.5–5.2)
ALP BLD-CCNC: 61 U/L (ref 40–129)
ALT SERPL-CCNC: 38 U/L (ref 0–40)
ANION GAP SERPL CALCULATED.3IONS-SCNC: 7 MMOL/L (ref 7–16)
AST SERPL-CCNC: 18 U/L (ref 0–39)
BASOPHILS ABSOLUTE: 0.04 E9/L (ref 0–0.2)
BASOPHILS RELATIVE PERCENT: 0.6 % (ref 0–2)
BILIRUB SERPL-MCNC: 0.3 MG/DL (ref 0–1.2)
BUN BLDV-MCNC: 26 MG/DL (ref 6–20)
CALCIUM SERPL-MCNC: 9.2 MG/DL (ref 8.6–10.2)
CHLORIDE BLD-SCNC: 104 MMOL/L (ref 98–107)
CO2: 25 MMOL/L (ref 22–29)
CREAT SERPL-MCNC: 0.9 MG/DL (ref 0.7–1.2)
EOSINOPHILS ABSOLUTE: 0.33 E9/L (ref 0.05–0.5)
EOSINOPHILS RELATIVE PERCENT: 5 % (ref 0–6)
GFR AFRICAN AMERICAN: >60
GFR NON-AFRICAN AMERICAN: >60 ML/MIN/1.73
GLUCOSE BLD-MCNC: 107 MG/DL (ref 74–99)
HCT VFR BLD CALC: 41.5 % (ref 37–54)
HEMOGLOBIN: 13.7 G/DL (ref 12.5–16.5)
IMMATURE GRANULOCYTES #: 0.04 E9/L
IMMATURE GRANULOCYTES %: 0.6 % (ref 0–5)
LYMPHOCYTES ABSOLUTE: 1.28 E9/L (ref 1.5–4)
LYMPHOCYTES RELATIVE PERCENT: 19.4 % (ref 20–42)
MCH RBC QN AUTO: 29 PG (ref 26–35)
MCHC RBC AUTO-ENTMCNC: 33 % (ref 32–34.5)
MCV RBC AUTO: 87.9 FL (ref 80–99.9)
MONOCYTES ABSOLUTE: 0.84 E9/L (ref 0.1–0.95)
MONOCYTES RELATIVE PERCENT: 12.7 % (ref 2–12)
NEUTROPHILS ABSOLUTE: 4.07 E9/L (ref 1.8–7.3)
NEUTROPHILS RELATIVE PERCENT: 61.7 % (ref 43–80)
PDW BLD-RTO: 13.2 FL (ref 11.5–15)
PLATELET # BLD: 219 E9/L (ref 130–450)
PMV BLD AUTO: 10.5 FL (ref 7–12)
POTASSIUM SERPL-SCNC: 4.3 MMOL/L (ref 3.5–5)
RBC # BLD: 4.72 E12/L (ref 3.8–5.8)
SODIUM BLD-SCNC: 136 MMOL/L (ref 132–146)
T4 FREE: 1.28 NG/DL (ref 0.93–1.7)
TOTAL PROTEIN: 6.9 G/DL (ref 6.4–8.3)
TSH SERPL DL<=0.05 MIU/L-ACNC: 8.46 UIU/ML (ref 0.27–4.2)
WBC # BLD: 6.6 E9/L (ref 4.5–11.5)

## 2022-09-15 PROCEDURE — 36415 COLL VENOUS BLD VENIPUNCTURE: CPT

## 2022-09-15 PROCEDURE — 84439 ASSAY OF FREE THYROXINE: CPT

## 2022-09-15 PROCEDURE — 99212 OFFICE O/P EST SF 10 MIN: CPT

## 2022-09-15 PROCEDURE — 85025 COMPLETE CBC W/AUTO DIFF WBC: CPT

## 2022-09-15 PROCEDURE — 84443 ASSAY THYROID STIM HORMONE: CPT

## 2022-09-15 PROCEDURE — 80053 COMPREHEN METABOLIC PANEL: CPT

## 2022-09-15 PROCEDURE — 99214 OFFICE O/P EST MOD 30 MIN: CPT | Performed by: INTERNAL MEDICINE

## 2022-09-15 PROCEDURE — 86800 THYROGLOBULIN ANTIBODY: CPT

## 2022-09-15 PROCEDURE — 84432 ASSAY OF THYROGLOBULIN: CPT

## 2022-09-15 NOTE — PROGRESS NOTES
Department of [facility] Oncology  Attending Clinic Note    Reason for Visit: Follow-up on a patient with Papillary Thyroid Carcinoma     PCP:  Marissa Oates DO    History of Present Illness:  33year old male that presented to Dr Rusty Tadeo for complain of swollen lymph nodes to his right neck that have been present for roughly 3 years. No famly history of lymphoma. No weight loss, fever or night sweats     CT soft tissue neck 11/29/2021 demonstrated scattered thyroid nodules largest on the right measuring up to 1.56 centimeters containing calcifications. Multiple scattered irregular right-sided lymph nodes predominantly in the right level two with slight extension into level three. Largest right level 2A/3 heterogeneous partially calcified lymph node/conglomerate measures up to 2.5 cm in long axis dimension. Largest right axial 2B/3 heterogeneous lymph node measures up to 1.7 cm in long axis dimension. A 0.7 cm oval enhancing lesion present within the right strap muscle just superior to the thyroid. CT chest 11/29/2021 demonstrated right thyroid nodule, no other acute abnormality is seen    Excisional biopsy of right cervical lymph node 12/07/2021  Cervical lymph node, right, biopsy: Papillary thyroid carcinoma   -Comment:   The carcinoma is positive for TTF-1 immunostain consistent with thyroid origin. Ultrasound head neck soft tissue 01/20/2022 demonstrated three adjacent abnormal appearing right level one submandibular lymph nodes arranged in a linear configuration. These all appear hypoechoic, the more superior of these measures 2.2 x 1.2 x 2 cm and appears more homogeneous and hypoechoic. The second lymph node measures 2 x 1.4 x 2 cm and contains multiple punctate calcifications.  The more inferior nodule measures 1.9 x 1 x 1.7 cm, these are suspicious for metastatic disease    Underwent ultrasound fine-needle aspiration 01/20/2022  FNA RT. THYROID   POSITIVE FOR MALIGNANT CELLS   Papillary carcinoma     Total thyroidectomy with central neck dissection 02/08/2022  A. Lymph nodes, right central compartment lymph node dissection: Three of five lymph nodes with metastatic papillary thyroid carcinoma (3/5). Extracapsular extension of tumor cells present. B.  Right lateral neck dissection:   Four of eight lymph nodes with metastatic papillary thyroid carcinoma (4/8). Extracapsular extension of tumor cells present. Unremarkable salivary gland tissue also identified. C.  Thyroid, total thyroidectomy:   Papillary thyroid carcinoma, multiple foci throughout specimen. Largest focus (right upper-to-mid lobe) abuts anterior and posterior inked margins. CANCER CASE SUMMARY:   Procedure: Total thyroidectomy   Tumor focality: Multifocal, see comment   Tumor sites: Right lobe, left lobe, and isthmus   Tumor size: Greatest dimension of 1.3 cm (first described tumor, right upper-to-mid lobe)   Histologic type:     -Papillary carcinoma, classic (usual, conventional); first and second described tumors and microcarcinomas     -Papillary carcinoma, follicular variant, encapsulated/well demarcated, non-invasive; third described tumor   -Angioinvasion (vascular invasion): Not identified   -Lymphatic invasion: Cannot be determined- While lymphatic invasion is not identified within the total thyroidectomy specimen, papillary carcinoma   is identified within lymphatics of the right central compartment and right lateral neck dissections (parts A and B).    -Extrathyroidal extension: Not identified   -Margin status: Margins involved by carcinoma, with papillary thyroid carcinoma identified at the inked anterior and posterior margins within the right upper-to-mid lobe (first described tumor)   -Regional lymph nodes: Regional lymph nodes present; tumor present in regional lymph nodes     Number of lymph nodes with tumor: 7   Femi level involved: Cannot be determined -     See specimens labeled as right central depressive disorder ([facility] [address]      Medications:  Reviewed and reconciled. Allergies:  No Known Allergies    Physical Exam:  /80   Pulse 64   Temp 97.2 °F (36.2 °C)   Ht 5' 9.5\" (1.765 m)   Wt 204 lb 14.4 oz (92.9 kg)   SpO2 98%   BMI 29.82 kg/m²   GENERAL: Alert, oriented x 3, not in acute distress. HEENT: PERRLA; EOMI. Oropharynx clear. LUNGS: CTA Oswaldo  CVS: RRR  EXTREMITIES: Without clubbing or cyanosis or edema. NEUROLOGIC: No focal deficits. ECOG PS 1    Lab Results   Component Value Date    WBC 6.6 09/15/2022    HGB 13.7 09/15/2022    HCT 41.5 09/15/2022    MCV 87.9 09/15/2022     09/15/2022     Lab Results   Component Value Date     09/15/2022    K 4.3 09/15/2022     09/15/2022    CO2 25 09/15/2022    BUN 26 (H) 09/15/2022    CREATININE 0.9 09/15/2022    GLUCOSE 107 (H) 09/15/2022    CALCIUM 9.2 09/15/2022    PROT 6.9 09/15/2022    LABALBU 4.3 09/15/2022    BILITOT 0.3 09/15/2022    ALKPHOS 61 09/15/2022    AST 18 09/15/2022    ALT 38 09/15/2022    LABGLOM >60 09/15/2022    GFRAA >60 09/15/2022     Lab Results   Component Value Date    TSH 8.460 (H) 09/15/2022     Free T4 1.28 (0.93-1.70)    Impression/Plan:  34 y/o male who underwent Total thyroidectomy with central neck dissection 02/08/2022  A. Lymph nodes, right central compartment lymph node dissection: Three of five lymph nodes with metastatic papillary thyroid carcinoma (3/5). Extracapsular extension of tumor cells present. B.  Right lateral neck dissection:   Four of eight lymph nodes with metastatic papillary thyroid carcinoma (4/8). Extracapsular extension of tumor cells present. Unremarkable salivary gland tissue also identified. C.  Thyroid, total thyroidectomy:   Papillary thyroid carcinoma, multiple foci throughout specimen. Largest focus (right upper-to-mid lobe) abuts anterior and posterior inked margins. CANCER CASE SUMMARY:   Procedure:  Total thyroidectomy   Tumor focality: Multifocal, see comment   Tumor sites: Right lobe, left lobe, and isthmus   Tumor size: Greatest dimension of 1.3 cm (first described tumor, right upper-to-mid lobe)   Histologic type:     -Papillary carcinoma, classic (usual, conventional); first and second described tumors and microcarcinomas     -Papillary carcinoma, follicular variant, encapsulated/well demarcated, non-invasive; third described tumor   -Angioinvasion (vascular invasion): Not identified   -Lymphatic invasion: Cannot be determined- While lymphatic invasion is not identified within the total thyroidectomy specimen, papillary carcinoma   is identified within lymphatics of the right central compartment and right lateral neck dissections (parts A and B). -Extrathyroidal extension: Not identified   -Margin status: Margins involved by carcinoma, with papillary thyroid carcinoma identified at the inked anterior and posterior margins within the right upper-to-mid lobe (first described tumor)   -Regional lymph nodes: Regional lymph nodes present; tumor present in regional lymph nodes     Number of lymph nodes with tumor: 7   Femi level involved: Cannot be determined -     See specimens labeled as right central compartment (part A) and right lateral neck dissection (part B)     Size of largest metastatic deposit: 3.0 cm (part B)     Extranodal extension: Present     Number of lymph nodes examined: 13     Femi levels examined: Cannot be determined       See specimens labeled as right central compartment (part A) and right lateral neck dissection (part B)   Pathologic stage classification (pTNM, AJCC eighth edition):    m (multiple primary tumors)    pT1b pN1 (subcategory cannot be determined)   Pathology reviewed. CT abdomen/pelvis 03/06/2022 normal CT of the abdomen pelvis. Imaging reviewed.  Labs reviewed   .100 Free T4 <0.10 on 03/10/2022  Thyroglobulin Ab <0.9 on 03/10/2022  Thyroglobulin 152.5 on 03/10/2022    Endocrinology team Dr Jay Bermudez for CRYSTAL evaluation    NM Thyroid metastasis scan whole body 04/25/2022 :  Single focus of activity within the neck     Uneventful I-131 administration for thyroid cancer therapy 04/29/2022    NM Thyroid metastasis scan whole body 05/06/2022 :  Single focus of activity within the neck which is a normal response status post surgery  Imaging reviewed. Labs reviewed. On levothyroxine 112 mcg po daily  TSH 51.310 on 07/07/2022; Free T4 0.88 (0.93-1.70). TSH 26.660 on 08/26/2022; Free T4  1.08 (0.93-1.70)   On levothyroxine 150 mcg po daily. TSH 8.460 on 09/15/2022; Free T4 1.28 (0.93-1.70)  Thyroid U/S scheduled on 10/12/2022  Recommended to continue follow-up with Endocrinology  Compliance was emphasized.     RTC 3 months    09/15/2022  Torres Zambrano MD

## 2022-09-15 NOTE — TELEPHONE ENCOUNTER
Met with patient during his follow up appointment with Dr. Corey Mason today. Patient completed his active treatment for his thyroid cancer. Patient appears in good spirits. Provided support and encouragement. Instructed patient in detail on his Cancer Treatment Summary and Survivorship Care Plan. Copy sent to patient's PCP Dr. Irina Alcocer DO. Provided patient with Patient Resource Survivorship booklet and Local Resources for Cancer Survivors. I also provided patient with written information on [employer] information, [employer] and my business card. Instructed to call with any questions or concerns. Verbally agreed. Patient appreciative of visit and information. I will discharge patient from a navigation standpoint.

## 2022-09-20 LAB
THYROGLOBULIN AB: <0.9 IU/ML (ref 0–4)
THYROGLOBULIN BY LC-MS/MS, SERUM/PLASMA: NORMAL NG/ML (ref 1.3–31.8)
THYROGLOBULIN, SERUM OR PLASMA: 16.1 NG/ML (ref 1.3–31.8)

## 2022-10-12 ENCOUNTER — HOSPITAL ENCOUNTER (OUTPATIENT)
Dept: ULTRASOUND IMAGING | Age: 32
Discharge: HOME | End: 2022-10-14
Payer: MEDICAID

## 2022-10-12 DIAGNOSIS — C73 THYROID CANCER (HCC): ICD-10-CM

## 2022-10-12 PROCEDURE — 76536 US EXAM OF HEAD AND NECK: CPT

## 2022-10-18 ENCOUNTER — TELEPHONE (OUTPATIENT)
Dept: ENDOCRINOLOGY | Age: 32
End: 2022-10-18

## 2022-11-07 ENCOUNTER — TELEPHONE (OUTPATIENT)
Dept: ENDOCRINOLOGY | Age: 32
End: 2022-11-07

## 2022-11-07 ENCOUNTER — OFFICE VISIT (OUTPATIENT)
Dept: ENDOCRINOLOGY | Age: 32
End: 2022-11-07
Payer: MEDICAID

## 2022-11-07 VITALS
SYSTOLIC BLOOD PRESSURE: 134 MMHG | OXYGEN SATURATION: 97 % | HEART RATE: 67 BPM | BODY MASS INDEX: 30.07 KG/M2 | WEIGHT: 203 LBS | DIASTOLIC BLOOD PRESSURE: 78 MMHG | HEIGHT: 69 IN

## 2022-11-07 DIAGNOSIS — C79.9 METASTASIS FROM THYROID CANCER (HCC): ICD-10-CM

## 2022-11-07 DIAGNOSIS — E03.9 HYPOTHYROIDISM, UNSPECIFIED TYPE: ICD-10-CM

## 2022-11-07 DIAGNOSIS — C73 THYROID CANCER (HCC): Primary | ICD-10-CM

## 2022-11-07 DIAGNOSIS — C77.0 MALIGNANT NEOPLASM OF THYROID METASTATIC TO LYMPH NODE OF NECK (HCC): ICD-10-CM

## 2022-11-07 DIAGNOSIS — C73 THYROID CANCER (HCC): ICD-10-CM

## 2022-11-07 DIAGNOSIS — C73 MALIGNANT NEOPLASM OF THYROID METASTATIC TO LYMPH NODE OF NECK (HCC): ICD-10-CM

## 2022-11-07 DIAGNOSIS — C73 METASTASIS FROM THYROID CANCER (HCC): ICD-10-CM

## 2022-11-07 LAB
T4 FREE: 1.13 NG/DL (ref 0.93–1.7)
T4 TOTAL: 6.7 MCG/DL (ref 4.5–11.7)
TSH SERPL DL<=0.05 MIU/L-ACNC: 9.47 UIU/ML (ref 0.27–4.2)

## 2022-11-07 PROCEDURE — 99214 OFFICE O/P EST MOD 30 MIN: CPT | Performed by: INTERNAL MEDICINE

## 2022-11-07 RX ORDER — LEVOTHYROXINE SODIUM 175 UG/1
175 TABLET ORAL DAILY
Qty: 90 TABLET | Refills: 3 | Status: SHIPPED
Start: 2022-11-07 | End: 2022-11-08

## 2022-11-07 NOTE — PROGRESS NOTES
[address] [place] [address] Department of Endocrinology Diabetes and Metabolism   [address] [facility] [address]   Phone: [phone]  Fax: [phone]    Date of Service: 11/7/2022  Primary Care Physician: Luis F Michelle DO  Referring physician: No ref. provider found   Provider: Renetta Vincent MD     Reason for the visit:  Papilary thyroid cancer, postsurgical hypothyroidism     History of Present Illness: The history is provided by the patient. No  was used. Accuracy of the patient data is excellent. Ritesh Wei is a very pleasant 33 y.o. male seen today for management of thyroid cancer     The patient underwent total thyroidectomy on 2/8/2022     Pathology report: Papillary carcinoma, classic Papillary carcinoma, follicular variant, encapsulated/well demarcated, non-invasive; Angioinvasion: Not identified, Lymphatic invasion: Cannot be determined- While lymphatic invasion is not identified within the total thyroidectomy specimen, papillary carcinoma identified within lymphatics of the right central compartment and right lateral neck dissections  Margin status: Margins involved by carcinoma,   Lymph nodes, right central compartment lymph node dissection: Three of five lymph nodes with metastatic papillary thyroid carcinoma   (3/5). Extracapsular extension of tumor cells present. Right lateral neck dissection:  Four of eight lymph nodes with metastatic papillary thyroid carcinoma   (4/8). Extracapsular extension of tumor cells present. Unremarkable salivary gland tissue also identified. Pathologic stage classification (pTNM, AJCC eighth edition): m (multiple primary tumors) pT1b pN1 (subcategory cannot be determined)     May/3/2022 - pt receive 150 mCi CRYSTAL ablation   Post therapy scan --> Single focus of activity within the neck may be residual thyroid tissue    Most recent  1012/2022:   There appears to be some residual or recurrent thyroid tissue on the left which measured 1.8 x 0.5 x 0.6 cm  There are several lymph nodes in the right neck soft tissues. Largest is a 16 mm lymph node seen on image 18. Reniform shape. Loss of fatty hilum. The lymph node is hypervascular and heterogeneous. Additional 10 mm lymph node and 6 mm lymph nodes have a somewhat round morphology. No suspicious lymph nodes identified on the left. Labs 9/15/2022 --> TSH 8.4, FT4 1.28, Tg 16.1, Tg-Ab negative     Pt currently on Levothyroxine 150 mcg daily. Dose was increased after last blood work . Patient takes levothyroxine in the morning at empty stomach, wait one hour before eating , avoid multivitamins containing calcium  or iron with it. PAST MEDICAL HISTORY   Past Medical History:   Diagnosis Date    Cancer ([address] 12/2021    papillary thyroid ca    Moderate episode of recurrent major depressive disorder [facility])        PAST SURGICAL HISTORY   Past Surgical History:   Procedure Laterality Date    AXILLARY SURGERY Right 12/7/2021    EXCISIONAL BIOPSY OF RIGHT CERVICAL LYMPH NODE performed by Teodoro Agosto DO at [place] [address]      left arm age 7     THYROIDECTOMY N/A 2/8/2022    FLEXIBLE LARYNGOSCOPY THYROIDECTOMY performed by [facility] DO at [facility] OR       SOCIAL HISTORY   Tobacco:   reports that he quit smoking about 21 months ago. His smoking use included cigarettes and e-cigarettes. He smoked an average of 1 pack per day. He has never used smokeless tobacco.  Alcohol:   reports current alcohol use. Drugs:   reports current drug use. Frequency: 80.00 times per week. Drugs: Marijuana (Weed) and Cocaine.     FAMILY HISTORY   Family History   Problem Relation Age of Onset    Other Father        ALLERGIES AND DRUG REACTIONS   No Known Allergies    CURRENT MEDICATIONS   Current Outpatient Medications   Medication Sig Dispense Refill    levothyroxine (SYNTHROID) 150 MCG tablet Take 1 tablet by mouth daily 30 tablet 1    pantoprazole (PROTONIX) 40 MG tablet Take 1 HGB 13.7 09/15/2022 10:42 AM    HCT 41.5 09/15/2022 10:42 AM    MCV 87.9 09/15/2022 10:42 AM    MCH 29.0 09/15/2022 10:42 AM    MCHC 33.0 09/15/2022 10:42 AM    RDW 13.2 09/15/2022 10:42 AM     09/15/2022 10:42 AM    MPV 10.5 09/15/2022 10:42 AM      Lab Results   Component Value Date/Time     09/15/2022 10:42 AM    K 4.3 09/15/2022 10:42 AM    K 3.9 06/12/2021 11:45 AM    CO2 25 09/15/2022 10:42 AM    BUN 26 (H) 09/15/2022 10:42 AM    CREATININE 0.9 09/15/2022 10:42 AM    CALCIUM 9.2 09/15/2022 10:42 AM    LABGLOM >60 09/15/2022 10:42 AM    GFRAA >60 09/15/2022 10:42 AM      Lab Results   Component Value Date/Time    TSH 8.460 (H) 09/15/2022 10:42 AM    T4FREE 1.28 09/15/2022 10:42 AM    THGAB <0.9 09/15/2022 10:42 AM     Lab Results   Component Value Date/Time    LABA1C 5.1 07/28/2019 06:07 AM    GLUCOSE 107 09/15/2022 10:42 AM     Lab Results   Component Value Date/Time    TRIG 98 07/28/2019 06:07 AM    HDL 65 07/28/2019 06:07 AM    LDLCALC 101 07/28/2019 06:07 AM    CHOL 186 07/28/2019 06:07 AM     No results found for: [address] Her[address], a 33 y.o.-old male seen in for following issues     Metastatic Papillary thyroid cancer   S/p total thyroidectomy on 2/8/2022. PTC with multiple LN metastasis   Pt received 150 mCi of CRYSTAL ablation in May/2022  Recent labs 9/15/2022) showed Tg level 16.1 with negative Tg-Ab   Thyroid US 10/2022 showed 1.8 cm remnant thyroid tissue on Left thyroid bed and suspicious 1.6 cm Rt side LN    Discussed CRYSTAL preparation and isolation precautions   Will proceed with FNA to this suspicious LN and also will obtain PET CT scan     Postsurgical hypothyroidism:  Check labs today and likely increase levothyroxine dose to 175 mcg daily  Goal to keep TSH <0.1     I personally reviewed external notes from PCP and other patient's care team providers, and personally interpreted labs associated with the above diagnosis.  I also ordered labs to further assess and manage the above addressed medical conditions. Return in about 9 weeks (around 1/9/2023) for thyroid cancer, VitD deficiency. The above issues were reviewed with the patient who understood and agreed with the plan. Thank you for allowing us to participate in the care of this patient. Please do not hesitate to contact us with any additional questions. Diagnosis Orders   1. Thyroid cancer (HCC)  levothyroxine (SYNTHROID) 175 MCG tablet    T4    T4, Free    Thyroglobulin      2. Hypothyroidism, unspecified type  levothyroxine (SYNTHROID) 175 MCG tablet    TSH    T4    T4, Free      3. Metastasis from thyroid cancer ([facility] [address]  PET CT SKULL BASE TO MID THIGH    US FINE NEEDLE ASPIRATION      4. Malignant neoplasm of thyroid metastatic to lymph node of neck [facility])  US FINE NEEDLE ASPIRATION          Raymond Hawley MD  Endocrinologist, [facility])   [address] [place] [id number]   Phone: [phone]  Fax: [phone]  ----------------------------  An electronic signature was used to authenticate this note.  Adelaida Baxter MD on 11/7/2022 at 12:13 PM

## 2022-11-08 ENCOUNTER — TELEPHONE (OUTPATIENT)
Dept: ENDOCRINOLOGY | Age: 32
End: 2022-11-08

## 2022-11-08 DIAGNOSIS — C73 THYROID CANCER (HCC): Primary | ICD-10-CM

## 2022-11-08 RX ORDER — LEVOTHYROXINE SODIUM 0.2 MG/1
200 TABLET ORAL DAILY
Qty: 30 TABLET | Refills: 11 | Status: SHIPPED | OUTPATIENT
Start: 2022-11-08

## 2022-11-08 NOTE — TELEPHONE ENCOUNTER
Thyroid hormones are below goal, judson increase synthroid from 175 to 200 mcg daily and recheck level in 6-8 weeks     I am still waiting to discuss your case with endocrine surgeon at [facility]. I am expecting them to call me today or tomorrow

## 2022-11-10 ENCOUNTER — TELEPHONE (OUTPATIENT)
Dept: ENDOCRINOLOGY | Age: 32
End: 2022-11-10

## 2022-11-10 LAB
THYROGLOBULIN AB: <0.9 IU/ML (ref 0–4)
THYROGLOBULIN BY LC-MS/MS, SERUM/PLASMA: NORMAL NG/ML (ref 1.3–31.8)
THYROGLOBULIN, SERUM OR PLASMA: 10.5 NG/ML (ref 1.3–31.8)

## 2022-12-02 ENCOUNTER — HOSPITAL ENCOUNTER (OUTPATIENT)
Dept: ULTRASOUND IMAGING | Age: 32
End: 2022-12-02
Payer: MEDICAID

## 2022-12-02 DIAGNOSIS — C73 MALIGNANT NEOPLASM OF THYROID METASTATIC TO LYMPH NODE OF NECK (HCC): ICD-10-CM

## 2022-12-02 DIAGNOSIS — C79.9 METASTASIS FROM THYROID CANCER (HCC): ICD-10-CM

## 2022-12-02 DIAGNOSIS — C73 METASTASIS FROM THYROID CANCER (HCC): ICD-10-CM

## 2022-12-02 DIAGNOSIS — C77.0 MALIGNANT NEOPLASM OF THYROID METASTATIC TO LYMPH NODE OF NECK (HCC): ICD-10-CM

## 2022-12-02 PROCEDURE — 88305 TISSUE EXAM BY PATHOLOGIST: CPT

## 2022-12-02 PROCEDURE — 88173 CYTOPATH EVAL FNA REPORT: CPT

## 2022-12-02 PROCEDURE — 10005 FNA BX W/US GDN 1ST LES: CPT

## 2022-12-02 NOTE — DISCHARGE INSTRUCTIONS
Fine Needle Biopsy: About This Test  What is a fine needle biopsy? A fine needle biopsy is a test of a sample of tissue that is looked at under a microscope. It may be done to check for cancer. For the biopsy, your doctor uses a thin needle to take a small sample of fluid or cells for testing. Why is it done? A fine needle biopsy is used to check a lump, mass, or other area of concern. These may have been found during imaging such as an X-ray, ultrasound, or mammography. How do you prepare for the test?  If you take aspirin or some other blood thinner, ask your doctor if you should stop taking it before your test. Make sure that you understand exactly what your doctor wants you to do. These medicines increase the risk of bleeding. How is the test done? Depending on what part of your body is being tested, you may sit in a chair or lie on a table. After you are positioned, the doctor or nurse will clean the area where the biopsy will be done. In most cases, you'll get a shot of medicine to numb the biopsy area. In some cases, you may have general anesthesia, which will make you sleep. When the area is numb, your doctor will insert a thin needle into the lump or tissue. If the lump cannot be felt, your doctor may use imaging such as ultrasound, a CT scan, or an MRI to guide the needle. The doctor will take one or more samples for the biopsy. A marker may be placed in the biopsy site. You won't be able to feel or see this marker after it is placed. The marker will be visible in future imaging tests, such as mammograms, ultrasounds, and MRIs. This will help the doctor find the area later. After the needle is removed, pressure is put on the needle site to stop any bleeding. The area is covered with a bandage. How does it feel? You may feel only a quick sting from the needle, either from the shot that numbed the area or from the biopsy itself.  If you have general anesthesia, you won't feel anything during the procedure. How long does it take? A fine needle biopsy can take about 5 to 15 minutes. But it will depend on what part of your body is being tested and if imaging will be used to guide the needle. It may also take longer if general anesthesia is used. What happens after the test?  You'll be told how long it may take to get your results. You will probably be able to go home right away. But this will depend on the location of the biopsy and if general anesthesia was used. After the doctor looks at the biopsy sample for signs of cancer, their office will let you know the results. If the test results aren't clear, you may have another biopsy or test.  How can you care for yourself at home? Your doctor will tell you when you can resume your normal activities. The site may be tender for 2 or 3 days. You may also have some bruising, swelling, or slight bleeding. You can use an ice pack. Put ice or a cold pack on the area for 10 to 20 minutes at a time. Put a thin cloth between the ice and your skin. Ask your doctor if you can take an over-the-counter pain medicine, such as acetaminophen (Tylenol), ibuprofen (Advil, Motrin), or naproxen (Aleve). Be safe with medicines. Read and follow all instructions on the label. When should you call for help? Call 911  anytime you think you may need emergency care. For example, call if:    You passed out (lost consciousness). You have severe trouble breathing. You have severe pain in your belly or chest.   Call your doctor now or seek medical care if:    You have new or worse pain or swelling. Bright red blood has soaked through the bandage over the biopsy site. You cough up blood. You are sick to your stomach or cannot keep fluids down. You are dizzy or lightheaded, or you feel like you may faint. You have signs of infection, such as: Increased pain, swelling, warmth or redness. Red streaks leading from the puncture site.   Pus draining from the puncture site. A fever. Watch closely for changes in your health, and be sure to contact your doctor if you have any problems. Follow-up care is a key part of your treatment and safety. Be sure to make and go to all appointments, and call your doctor if you are having problems. Ask your doctor when you can expect to have your test results. Current as of: December 27, 2021               Content Version: 13.4  © 2006-2022 [employer]. Care instructions adapted under license by [facility] [address] If you have questions about a medical condition or this instruction, always ask your healthcare professional. [place] [address] any warranty or liability for your use of this information.

## 2022-12-08 ENCOUNTER — TELEPHONE (OUTPATIENT)
Dept: ENDOCRINOLOGY | Age: 32
End: 2022-12-08

## 2022-12-08 NOTE — TELEPHONE ENCOUNTER
I called pt and left VM to call our office back    I want to talk to him regarding recent FNA result

## 2022-12-10 ENCOUNTER — TELEPHONE (OUTPATIENT)
Dept: ENDOCRINOLOGY | Age: 32
End: 2022-12-10

## 2022-12-11 NOTE — TELEPHONE ENCOUNTER
Please schedule an appointment at [facility] for metastatic thyroid cancer.      Please schedule with them ASAP

## 2022-12-12 ENCOUNTER — TELEPHONE (OUTPATIENT)
Dept: ENDOCRINOLOGY | Age: 32
End: 2022-12-12

## 2022-12-12 DIAGNOSIS — C73 THYROID CANCER (HCC): Primary | ICD-10-CM

## 2022-12-12 NOTE — TELEPHONE ENCOUNTER
Please refer this pt to [facility] for thyroid cancer s/p thyroidectomy and CRYSTAL and now with positive LN

## 2022-12-15 ENCOUNTER — HOSPITAL ENCOUNTER (OUTPATIENT)
Dept: INFUSION THERAPY | Age: 32
Discharge: HOME | End: 2022-12-15
Payer: MEDICAID

## 2022-12-15 ENCOUNTER — OFFICE VISIT (OUTPATIENT)
Dept: ONCOLOGY | Age: 32
End: 2022-12-15
Payer: MEDICAID

## 2022-12-15 VITALS
OXYGEN SATURATION: 99 % | WEIGHT: 199.7 LBS | DIASTOLIC BLOOD PRESSURE: 77 MMHG | TEMPERATURE: 97.4 F | HEART RATE: 66 BPM | SYSTOLIC BLOOD PRESSURE: 123 MMHG | BODY MASS INDEX: 29.58 KG/M2 | HEIGHT: 69 IN

## 2022-12-15 DIAGNOSIS — C73 PAPILLARY THYROID CARCINOMA (HCC): Primary | ICD-10-CM

## 2022-12-15 DIAGNOSIS — C73 PAPILLARY THYROID CARCINOMA (HCC): ICD-10-CM

## 2022-12-15 LAB
ALBUMIN SERPL-MCNC: 4.1 G/DL (ref 3.5–5.2)
ALP BLD-CCNC: 81 U/L (ref 40–129)
ALT SERPL-CCNC: 17 U/L (ref 0–40)
ANION GAP SERPL CALCULATED.3IONS-SCNC: 10 MMOL/L (ref 7–16)
AST SERPL-CCNC: 12 U/L (ref 0–39)
BASOPHILS ABSOLUTE: 0.04 E9/L (ref 0–0.2)
BASOPHILS RELATIVE PERCENT: 0.6 % (ref 0–2)
BILIRUB SERPL-MCNC: <0.2 MG/DL (ref 0–1.2)
BUN BLDV-MCNC: 24 MG/DL (ref 6–20)
CALCIUM SERPL-MCNC: 9.4 MG/DL (ref 8.6–10.2)
CHLORIDE BLD-SCNC: 102 MMOL/L (ref 98–107)
CO2: 24 MMOL/L (ref 22–29)
CREAT SERPL-MCNC: 0.8 MG/DL (ref 0.7–1.2)
EOSINOPHILS ABSOLUTE: 0.49 E9/L (ref 0.05–0.5)
EOSINOPHILS RELATIVE PERCENT: 7.3 % (ref 0–6)
GFR SERPL CREATININE-BSD FRML MDRD: >60 ML/MIN/1.73
GLUCOSE BLD-MCNC: 145 MG/DL (ref 74–99)
HCT VFR BLD CALC: 41.7 % (ref 37–54)
HEMOGLOBIN: 13.9 G/DL (ref 12.5–16.5)
IMMATURE GRANULOCYTES #: 0.01 E9/L
IMMATURE GRANULOCYTES %: 0.1 % (ref 0–5)
LYMPHOCYTES ABSOLUTE: 1.71 E9/L (ref 1.5–4)
LYMPHOCYTES RELATIVE PERCENT: 25.5 % (ref 20–42)
MCH RBC QN AUTO: 28.4 PG (ref 26–35)
MCHC RBC AUTO-ENTMCNC: 33.3 % (ref 32–34.5)
MCV RBC AUTO: 85.1 FL (ref 80–99.9)
MONOCYTES ABSOLUTE: 0.5 E9/L (ref 0.1–0.95)
MONOCYTES RELATIVE PERCENT: 7.5 % (ref 2–12)
NEUTROPHILS ABSOLUTE: 3.96 E9/L (ref 1.8–7.3)
NEUTROPHILS RELATIVE PERCENT: 59 % (ref 43–80)
PDW BLD-RTO: 12.9 FL (ref 11.5–15)
PLATELET # BLD: 260 E9/L (ref 130–450)
PMV BLD AUTO: 10.4 FL (ref 7–12)
POTASSIUM SERPL-SCNC: 4.5 MMOL/L (ref 3.5–5)
RBC # BLD: 4.9 E12/L (ref 3.8–5.8)
SODIUM BLD-SCNC: 136 MMOL/L (ref 132–146)
T4 FREE: 1.72 NG/DL (ref 0.93–1.7)
TOTAL PROTEIN: 6.8 G/DL (ref 6.4–8.3)
TSH SERPL DL<=0.05 MIU/L-ACNC: 0.11 UIU/ML (ref 0.27–4.2)
WBC # BLD: 6.7 E9/L (ref 4.5–11.5)

## 2022-12-15 PROCEDURE — 99212 OFFICE O/P EST SF 10 MIN: CPT

## 2022-12-15 PROCEDURE — 85025 COMPLETE CBC W/AUTO DIFF WBC: CPT

## 2022-12-15 PROCEDURE — 84432 ASSAY OF THYROGLOBULIN: CPT

## 2022-12-15 PROCEDURE — 84439 ASSAY OF FREE THYROXINE: CPT

## 2022-12-15 PROCEDURE — 84443 ASSAY THYROID STIM HORMONE: CPT

## 2022-12-15 PROCEDURE — 36415 COLL VENOUS BLD VENIPUNCTURE: CPT

## 2022-12-15 PROCEDURE — 86800 THYROGLOBULIN ANTIBODY: CPT

## 2022-12-15 PROCEDURE — 80053 COMPREHEN METABOLIC PANEL: CPT

## 2022-12-15 NOTE — PROGRESS NOTES
Department of [facility] Oncology  Attending Clinic Note    Reason for Visit: Follow-up on a patient with Papillary Thyroid Carcinoma     PCP:  Cardinal Duke, DO    History of Present Illness:  27year old male that presented to Dr Liliam Rogers for complain of swollen lymph nodes to his right neck that have been present for roughly 3 years. No famly history of lymphoma. No weight loss, fever or night sweats     CT soft tissue neck 11/29/2021 demonstrated scattered thyroid nodules largest on the right measuring up to 1.56 centimeters containing calcifications. Multiple scattered irregular right-sided lymph nodes predominantly in the right level two with slight extension into level three. Largest right level 2A/3 heterogeneous partially calcified lymph node/conglomerate measures up to 2.5 cm in long axis dimension. Largest right axial 2B/3 heterogeneous lymph node measures up to 1.7 cm in long axis dimension. A 0.7 cm oval enhancing lesion present within the right strap muscle just superior to the thyroid. CT chest 11/29/2021 demonstrated right thyroid nodule, no other acute abnormality is seen    Excisional biopsy of right cervical lymph node 12/07/2021  Cervical lymph node, right, biopsy: Papillary thyroid carcinoma   -Comment:   The carcinoma is positive for TTF-1 immunostain consistent with thyroid origin. Ultrasound head neck soft tissue 01/20/2022 demonstrated three adjacent abnormal appearing right level one submandibular lymph nodes arranged in a linear configuration. These all appear hypoechoic, the more superior of these measures 2.2 x 1.2 x 2 cm and appears more homogeneous and hypoechoic. The second lymph node measures 2 x 1.4 x 2 cm and contains multiple punctate calcifications.  The more inferior nodule measures 1.9 x 1 x 1.7 cm, these are suspicious for metastatic disease    Underwent ultrasound fine-needle aspiration 01/20/2022  FNA RT. THYROID   POSITIVE FOR MALIGNANT CELLS   Papillary carcinoma     Total thyroidectomy with central neck dissection 02/08/2022  A. Lymph nodes, right central compartment lymph node dissection: Three of five lymph nodes with metastatic papillary thyroid carcinoma (3/5). Extracapsular extension of tumor cells present. B.  Right lateral neck dissection:   Four of eight lymph nodes with metastatic papillary thyroid carcinoma (4/8). Extracapsular extension of tumor cells present. Unremarkable salivary gland tissue also identified. C.  Thyroid, total thyroidectomy:   Papillary thyroid carcinoma, multiple foci throughout specimen. Largest focus (right upper-to-mid lobe) abuts anterior and posterior inked margins. CANCER CASE SUMMARY:   Procedure: Total thyroidectomy   Tumor focality: Multifocal, see comment   Tumor sites: Right lobe, left lobe, and isthmus   Tumor size: Greatest dimension of 1.3 cm (first described tumor, right upper-to-mid lobe)   Histologic type:     -Papillary carcinoma, classic (usual, conventional); first and second described tumors and microcarcinomas     -Papillary carcinoma, follicular variant, encapsulated/well demarcated, non-invasive; third described tumor   -Angioinvasion (vascular invasion): Not identified   -Lymphatic invasion: Cannot be determined- While lymphatic invasion is not identified within the total thyroidectomy specimen, papillary carcinoma   is identified within lymphatics of the right central compartment and right lateral neck dissections (parts A and B).    -Extrathyroidal extension: Not identified   -Margin status: Margins involved by carcinoma, with papillary thyroid carcinoma identified at the inked anterior and posterior margins within the right upper-to-mid lobe (first described tumor)   -Regional lymph nodes: Regional lymph nodes present; tumor present in regional lymph nodes     Number of lymph nodes with tumor: 7   Feim level involved: Cannot be determined -     See specimens labeled as right central compartment (part A) and right lateral neck dissection (part B)     Size of largest metastatic deposit: 3.0 cm (part B)     Extranodal extension: Present     Number of lymph nodes examined: 13     Femi levels examined: Cannot be determined       See specimens labeled as right central compartment (part A) and right lateral neck dissection (part B)   Pathologic stage classification (pTNM, AJCC eighth edition):    m (multiple primary tumors)    pT1b pN1 (subcategory cannot be determined)     CT abdomen/pelvis 03/06/2022 normal CT of the abdomen pelvis. Imaging reviewed. Labs reviewed   .100 Free T4 <0.10 on 03/10/2022  Thyroglobulin Ab <0.9 on 03/10/2022  Thyroglobulin 152.5 on 03/10/2022    Endocrinology team Dr Yojana Ferguson for CRYSTAL evaluation    NM Thyroid metastasis scan whole body 04/25/2022 :  Single focus of activity within the neck     Uneventful I-131 administration for thyroid cancer therapy 04/29/2022    NM Thyroid metastasis scan whole body 05/06/2022 :  Single focus of activity within the neck which is a normal response status post surgery  On synthroid 112 mcg po daily. TSH 51.310 on 07/07/2022; Free T4 0.88 (0.93-1.70). TSH 26.660 on 08/26/2022; Free T4  1.08 (0.93-1.70)   On levothyroxine 150 mcg po daily. TSH 8.460 on 09/15/2022; Free T4 1.28 (0.93-1.70)    Thyroid U/S on 10/12/2022: Status post thyroidectomy. There appears to be some residual or recurrent thyroid tissue on the left. No thyroid nodules in this region. There are few prominent lymph nodes in the right neck soft tissues. Largest measures 16 mm. Reniform shape however there is an loss of the fatty hilum. The lymph node is hypervascular and mildly heterogeneous. Additional smaller lymph nodes in the right neck soft tissues. TSH 9.470 on 11/07/2022; T4 Total 6.7 (4.5-11.7); Free T4 1.13 (0.93-1.70) on 11/07/2022  Thyroglobulin Ab <0.9  Thyroglobulin       10.5 (1.3-31. 8)    Increased Synthroid to 200 mcg po daily and recheck in 6-8 weeks    Ultrasound-guided FNA of an abnormal right cervical lymph node on 12/2/2022: Today 12/15/2022; no fever chills. Fair appetite and energy level. No chest pain or shortness of breath. No nausea vomiting. He is meeting with [id number] Thyroid Cancer team next Monday on 12/19/2022    Review of Systems;  CONSTITUTIONAL: No fever, chills. Fair appetite and energy level. ENMT: Eyes: No diplopia; Nose: No epistaxis. Mouth: No sore throat. RESPIRATORY: No hemoptysis, shortness of breath, cough. CARDIOVASCULAR: No chest pain, palpitations. GASTROINTESTINAL: No nausea/vomiting, abdominal pain. GENITOURINARY: No dysuria, urinary frequency, hematuria. NEURO: No syncope, presyncope, headache. Remainder:  ROS NEGATIVE    Past Medical History:      Diagnosis Date    [facility] 12[facility]2021    papillary thyroid ca    Moderate episode of recurrent major depressive disorder (HCC)      Medications:  Reviewed and reconciled. Allergies:  No Known Allergies    Physical Exam:  /77   Pulse 66   Temp 97.4 °F (36.3 °C)   Ht 5' 9\" (1.753 m)   Wt 199 lb 11.2 oz (90.6 kg)   SpO2 99%   BMI 29.49 kg/m²   GENERAL: Alert, oriented x 3, not in acute distress. HEENT: PERRLA; EOMI. Oropharynx clear. NECK: Incision intact. Right palpable cervical LN  EXTREMITIES: Without clubbing or cyanosis or edema. NEUROLOGIC: No focal deficits. ECOG PS 1    Lab Results   Component Value Date    WBC 6.7 12/15/2022    HGB 13.9 12/15/2022    HCT 41.7 12/15/2022    MCV 85.1 12/15/2022     12/15/2022     Lab Results   Component Value Date    TSH 9.470 (H) 11/07/2022     Impression/Plan:  [address] y/o male who underwent Total thyroidectomy with central neck dissection 02/08/2022  A. Lymph nodes, right central compartment lymph node dissection: Three of five lymph nodes with metastatic papillary thyroid carcinoma (3/5). Extracapsular extension of tumor cells present.      B.  Right lateral neck dissection:   Four of eight lymph nodes with metastatic papillary thyroid carcinoma (4/8). Extracapsular extension of tumor cells present. Unremarkable salivary gland tissue also identified. C.  Thyroid, total thyroidectomy:   Papillary thyroid carcinoma, multiple foci throughout specimen. Largest focus (right upper-to-mid lobe) abuts anterior and posterior inked margins. CANCER CASE SUMMARY:   Procedure: Total thyroidectomy   Tumor focality: Multifocal, see comment   Tumor sites: Right lobe, left lobe, and isthmus   Tumor size: Greatest dimension of 1.3 cm (first described tumor, right upper-to-mid lobe)   Histologic type:     -Papillary carcinoma, classic (usual, conventional); first and second described tumors and microcarcinomas     -Papillary carcinoma, follicular variant, encapsulated/well demarcated, non-invasive; third described tumor   -Angioinvasion (vascular invasion): Not identified   -Lymphatic invasion: Cannot be determined- While lymphatic invasion is not identified within the total thyroidectomy specimen, papillary carcinoma   is identified within lymphatics of the right central compartment and right lateral neck dissections (parts A and B).    -Extrathyroidal extension: Not identified   -Margin status: Margins involved by carcinoma, with papillary thyroid carcinoma identified at the inked anterior and posterior margins within the right upper-to-mid lobe (first described tumor)   -Regional lymph nodes: Regional lymph nodes present; tumor present in regional lymph nodes     Number of lymph nodes with tumor: 7   Femi level involved: Cannot be determined -     See specimens labeled as right central compartment (part A) and right lateral neck dissection (part B)     Size of largest metastatic deposit: 3.0 cm (part B)     Extranodal extension: Present     Number of lymph nodes examined: 13     Femi levels examined: Cannot be determined       See specimens labeled as right central compartment (part A) and right lateral neck dissection (part B)   Pathologic stage classification (pTNM, AJCC eighth edition):    m (multiple primary tumors)    pT1b pN1 (subcategory cannot be determined)   Pathology reviewed. CT abdomen/pelvis 03/06/2022 normal CT of the abdomen pelvis. Imaging reviewed. Labs reviewed   .100 Free T4 <0.10 on 03/10/2022  Thyroglobulin Ab <0.9 on 03/10/2022  Thyroglobulin 152.5 on 03/10/2022    Endocrinology team Dr Naldo Whitman for CRYSTAL evaluation    NM Thyroid metastasis scan whole body 04/25/2022 :  Single focus of activity within the neck     Uneventful I-131 administration for thyroid cancer therapy 04/29/2022    NM Thyroid metastasis scan whole body 05/06/2022 :  Single focus of activity within the neck which is a normal response status post surgery  Imaging reviewed. Labs reviewed. On levothyroxine 112 mcg po daily  TSH 51.310 on 07/07/2022; Free T4 0.88 (0.93-1.70). TSH 26.660 on 08/26/2022; Free T4  1.08 (0.93-1.70)   On levothyroxine 150 mcg po daily. TSH 8.460 on 09/15/2022; Free T4 1.28 (0.93-1.70)    Thyroid U/S on 10/12/2022: Status post thyroidectomy. There appears to be some residual or recurrent thyroid tissue on the left. No thyroid nodules in this region. There are few prominent lymph nodes in the right neck soft tissues. Largest measures 16 mm. Reniform shape however there is an loss of the fatty hilum. The lymph node is hypervascular and mildly heterogeneous. Additional smaller lymph nodes in the right neck soft tissues. TSH 9.470 on 11/07/2022; T4 Total 6.7 (4.5-11.7); Free T4 1.13 (0.93-1.70) on 11/07/2022  Thyroglobulin Ab <0.9  Thyroglobulin       10.5 (1.3-31. 8)    Increased Synthroid to 200 mcg po daily and recheck in 6-8 weeks    Ultrasound-guided FNA of an abnormal right cervical lymph node on 12/2/2022:  Positive for malignant cells. Metastatic Papillary Thyroid Carcinoma  Imaging reviewed. Labs reviewed. Pathology reviewed.  Referred to [facility] Thyroid Cancer team for further evaluation; appointment on 12/19/2022    RTC 1 month    12/15/2022  Drake Connelly MD

## 2022-12-17 LAB
THYROGLOBULIN AB: <0.9 IU/ML (ref 0–4)
THYROGLOBULIN BY LC-MS/MS, SERUM/PLASMA: NORMAL NG/ML (ref 1.3–31.8)
THYROGLOBULIN, SERUM OR PLASMA: 5.5 NG/ML (ref 1.3–31.8)

## 2023-01-09 ENCOUNTER — OFFICE VISIT (OUTPATIENT)
Dept: ENDOCRINOLOGY | Age: 33
End: 2023-01-09
Payer: MEDICAID

## 2023-01-09 VITALS
DIASTOLIC BLOOD PRESSURE: 83 MMHG | HEART RATE: 76 BPM | OXYGEN SATURATION: 99 % | HEIGHT: 70 IN | SYSTOLIC BLOOD PRESSURE: 119 MMHG | WEIGHT: 201 LBS | BODY MASS INDEX: 28.77 KG/M2 | RESPIRATION RATE: 18 BRPM

## 2023-01-09 DIAGNOSIS — C73 THYROID CANCER (HCC): Primary | ICD-10-CM

## 2023-01-09 DIAGNOSIS — E03.9 HYPOTHYROIDISM, UNSPECIFIED TYPE: ICD-10-CM

## 2023-01-09 PROCEDURE — 99214 OFFICE O/P EST MOD 30 MIN: CPT | Performed by: INTERNAL MEDICINE

## 2023-01-09 NOTE — PROGRESS NOTES
[address] [place] [address] Department of Endocrinology Diabetes and Metabolism   [address] [facility] [address]   Phone: [phone]  Fax: [phone]    Date of Service: 1/9/2023  Primary Care Physician: Rodger Ramirez DO  Referring physician: No ref. provider found   Provider: Marek Burdick MD     Reason for the visit:  Papilary thyroid cancer, postsurgical hypothyroidism     History of Present Illness: The history is provided by the patient. No  was used. Accuracy of the patient data is excellent. Kandice Velarde is a very pleasant 27 y.o. male seen today for management of thyroid cancer     The patient underwent total thyroidectomy on 2/8/2022     Pathology report: Papillary carcinoma, classic Papillary carcinoma, follicular variant, encapsulated/well demarcated, non-invasive; Angioinvasion: Not identified, Lymphatic invasion: Cannot be determined- While lymphatic invasion is not identified within the total thyroidectomy specimen, papillary carcinoma identified within lymphatics of the right central compartment and right lateral neck dissections  Margin status: Margins involved by carcinoma,   Lymph nodes, right central compartment lymph node dissection: Three of five lymph nodes with metastatic papillary thyroid carcinoma   (3/5). Extracapsular extension of tumor cells present. Right lateral neck dissection:  Four of eight lymph nodes with metastatic papillary thyroid carcinoma   (4/8). Extracapsular extension of tumor cells present. Unremarkable salivary gland tissue also identified. Pathologic stage classification (pTNM, AJCC eighth edition): m (multiple primary tumors) pT1b pN1 (subcategory cannot be determined)     May/3/2022 - pt receive 150 mCi CRYSTAL ablation   Post therapy scan --> Single focus of activity within the neck may be residual thyroid tissue    US 1012/2022:   There appears to be some residual or recurrent thyroid tissue on the left which measured 1.8 x 0.5 x 0.6 cm  There are several lymph nodes in the right neck soft tissues. Largest is a 16 mm lymph node seen on image 18. Reniform shape. Loss of fatty hilum. The lymph node is hypervascular and heterogeneous. Additional 10 mm lymph node and 6 mm lymph nodes have a somewhat round morphology. No suspicious lymph nodes identified on the left. 11/2022 --> FNA to suspicious Rt side cervical LN was POSITIVE for PTC     Pt scheduled for for another thyroid surgery at [facility] in few days     Pt currently on Levothyroxine 200 mcg daily. Dose was increased after last blood work . Patient takes levothyroxine in the morning at empty stomach, wait one hour before eating , avoid multivitamins containing calcium  or iron with it. PAST MEDICAL HISTORY   Past Medical History:   Diagnosis Date    Cancer ([address] 12/2021    papillary thyroid ca    Moderate episode of recurrent major depressive disorder [facility])        PAST SURGICAL HISTORY   Past Surgical History:   Procedure Laterality Date    AXILLARY SURGERY Right 12/7/2021    EXCISIONAL BIOPSY OF RIGHT CERVICAL LYMPH NODE performed by Edi Mullen DO at [place] [address]      left arm age 7     THYROIDECTOMY N/A 2/8/2022    FLEXIBLE LARYNGOSCOPY THYROIDECTOMY performed by Edi Mullen DO at [facility] OR       SOCIAL HISTORY   Tobacco:   reports that he quit smoking about 23 months ago. His smoking use included cigarettes and e-cigarettes. He smoked an average of 1 pack per day. He has never used smokeless tobacco.  Alcohol:   reports current alcohol use. Drugs:   reports current drug use. Frequency: 80.00 times per week. Drugs: Marijuana (Weed) and Cocaine.     FAMILY HISTORY   Family History   Problem Relation Age of Onset    Other Father        ALLERGIES AND DRUG REACTIONS   No Known Allergies    CURRENT MEDICATIONS   Current Outpatient Medications   Medication Sig Dispense Refill    levothyroxine (SYNTHROID) 200 MCG tablet Take 1 tablet by mouth daily 30 tablet 11    pantoprazole (PROTONIX) 40 MG tablet Take 1 tablet by mouth daily (with breakfast) 30 tablet 3     No current facility-administered medications for this visit. Review of Systems  Constitutional: No fever, no chills, no diaphoresis, no generalized weakness. HEENT: No blurred vision, No sore throat, no ear pain, no hair loss  Neck: denied any neck swelling, difficulty swallowing,   Cadrdiopulomary: No CP, SOB or palpitation, No orthopnea or PND. No cough or wheezing. GI: No N/V/D, no constipation, No abdominal pain, no melena or hematochezia   : Denied any dysuria, hematuria, flank pain, discharge, or incontinence. Skin: denied any rash, ulcer, Hirsute, or hyperpigmentation. MSK: denied any joint deformity, joint pain/swelling, muscle pain, or back pain. Neuro: no numbess, no tingling, no weakness,     OBJECTIVE    /83   Pulse 76   Resp 18   Ht 5' 10\" (1.778 m)   Wt 201 lb (91.2 kg)   SpO2 99%   BMI 28.84 kg/m²   BP Readings from Last 4 Encounters:   01/09/23 119/83   12/15/22 123/77   11/07/22 134/78   09/15/22 128/80     Wt Readings from Last 6 Encounters:   01/09/23 201 lb (91.2 kg)   12/15/22 199 lb 11.2 oz (90.6 kg)   11/07/22 203 lb (92.1 kg)   09/15/22 204 lb 14.4 oz (92.9 kg)   08/26/22 178 lb (80.7 kg)   07/07/22 179 lb (81.2 kg)       Physical examination:  General: awake alert, oriented x3, no abnormal position or movements. HEENT: normocephalic non traumatic  Neck: supple, s/p total thyroidectomy   Pulm: Clear equal air entry no added sounds, no wheezing or rhonchi    CVS: S1 + S2, no murmur, no heave. Dorsalis pedis pulse palpable   Abd: soft lax, no tenderness, no organomegaly, audible bowel sounds.   Skin: warm, no lesions, no rash  MSK: No local spine tenderness   Neuro: CN intact, sensation notmal , muscle power normal. No tremors   Psych: normal mood, and affect    Review of Laboratory Data:  I have reviewed the following:  Lab Results   Component Value Date/Time    WBC 6.7 12/15/2022 10:00 AM    RBC 4.90 12/15/2022 10:00 AM    HGB 13.9 12/15/2022 10:00 AM    HCT 41.7 12/15/2022 10:00 AM    MCV 85.1 12/15/2022 10:00 AM    MCH 28.4 12/15/2022 10:00 AM    MCHC 33.3 12/15/2022 10:00 AM    RDW 12.9 12/15/2022 10:00 AM     12/15/2022 10:00 AM    MPV 10.4 12/15/2022 10:00 AM      Lab Results   Component Value Date/Time     12/15/2022 10:00 AM    K 4.5 12/15/2022 10:00 AM    K 3.9 06/12/2021 11:45 AM    CO2 24 12/15/2022 10:00 AM    BUN 24 (H) 12/15/2022 10:00 AM    CREATININE 0.8 12/15/2022 10:00 AM    CALCIUM 9.4 12/15/2022 10:00 AM    LABGLOM >60 12/15/2022 10:00 AM    GFRAA >60 09/15/2022 10:42 AM      Lab Results   Component Value Date/Time    TSH 0.111 (L) 12/15/2022 10:00 AM    T4FREE 1.72 (H) 12/15/2022 10:00 AM    E5OAYOG 6.7 11/07/2022 12:35 PM    THGAB <0.9 12/15/2022 10:00 AM     Lab Results   Component Value Date/Time    LABA1C 5.1 07/28/2019 06:07 AM    GLUCOSE 145 12/15/2022 10:00 AM     Lab Results   Component Value Date/Time    TRIG 98 07/28/2019 06:07 AM    HDL 65 07/28/2019 06:07 AM    LDLCALC 101 07/28/2019 06:07 AM    CHOL 186 07/28/2019 06:07 AM     No results found for: [address], a 27 y.o.-old male seen in for following issues     Metastatic Papillary thyroid cancer   S/p total thyroidectomy on 2/8/2022. PTC with multiple LN metastasis   Pt received 150 mCi of CRYSTAL ablation in May/2022  Recent labs 9/15/2022) showed Tg level 16.1 with negative Tg-Ab   Thyroid US 10/2022 showed 1.8 cm remnant thyroid tissue on Left thyroid bed and suspicious 1.6 cm Rt side LN    Discussed CRYSTAL preparation and isolation precautions   11/2022 --> FNA to suspicious Rt side cervical LN was POSITIVE for PTC   Pt scheduled for surgery at  in few days     Postsurgical hypothyroidism:   On levothyroxine 200 mcg daily  Check TFT   Goal to keep TSH <0.1     I personally reviewed external notes from PCP and other patient's care team providers, and personally interpreted labs associated with the above diagnosis. I also ordered labs to further assess and manage the above addressed medical conditions. Return in about 9 weeks (around 3/13/2023). The above issues were reviewed with the patient who understood and agreed with the plan. Thank you for allowing us to participate in the care of this patient. Please do not hesitate to contact us with any additional questions. Diagnosis Orders   1. Thyroid cancer ([facility]  TSH    T4, Free      2. Hypothyroidism, unspecified type  TSH    T4, Free          Adina Turner MD  Endocrinologist, [address] [address]   Phone: [phone]  Fax: [phone]  ----------------------------  An electronic signature was used to authenticate this note.  Tio Leung MD on 1/9/2023 at 11:50 AM

## 2023-01-12 ENCOUNTER — OFFICE VISIT (OUTPATIENT)
Dept: ONCOLOGY | Age: 33
End: 2023-01-12
Payer: MEDICAID

## 2023-01-12 ENCOUNTER — HOSPITAL ENCOUNTER (OUTPATIENT)
Dept: INFUSION THERAPY | Age: 33
Discharge: HOME | End: 2023-01-12
Payer: MEDICAID

## 2023-01-12 VITALS
TEMPERATURE: 96.8 F | OXYGEN SATURATION: 98 % | HEART RATE: 63 BPM | HEIGHT: 70 IN | WEIGHT: 203 LBS | BODY MASS INDEX: 29.06 KG/M2 | DIASTOLIC BLOOD PRESSURE: 72 MMHG | RESPIRATION RATE: 16 BRPM | SYSTOLIC BLOOD PRESSURE: 120 MMHG

## 2023-01-12 DIAGNOSIS — C73 PAPILLARY THYROID CARCINOMA (HCC): Primary | ICD-10-CM

## 2023-01-12 DIAGNOSIS — C73 PAPILLARY THYROID CARCINOMA (HCC): ICD-10-CM

## 2023-01-12 LAB
ALBUMIN SERPL-MCNC: 4.1 G/DL (ref 3.5–5.2)
ALP BLD-CCNC: 83 U/L (ref 40–129)
ALT SERPL-CCNC: 18 U/L (ref 0–40)
ANION GAP SERPL CALCULATED.3IONS-SCNC: 12 MMOL/L (ref 7–16)
AST SERPL-CCNC: 12 U/L (ref 0–39)
BASOPHILS ABSOLUTE: 0.04 E9/L (ref 0–0.2)
BASOPHILS RELATIVE PERCENT: 0.6 % (ref 0–2)
BILIRUB SERPL-MCNC: <0.2 MG/DL (ref 0–1.2)
BUN BLDV-MCNC: 17 MG/DL (ref 6–20)
CALCIUM SERPL-MCNC: 9.3 MG/DL (ref 8.6–10.2)
CHLORIDE BLD-SCNC: 105 MMOL/L (ref 98–107)
CO2: 21 MMOL/L (ref 22–29)
CREAT SERPL-MCNC: 0.8 MG/DL (ref 0.7–1.2)
EOSINOPHILS ABSOLUTE: 0.48 E9/L (ref 0.05–0.5)
EOSINOPHILS RELATIVE PERCENT: 7.2 % (ref 0–6)
GFR SERPL CREATININE-BSD FRML MDRD: >60 ML/MIN/1.73
GLUCOSE BLD-MCNC: 101 MG/DL (ref 74–99)
HCT VFR BLD CALC: 42.5 % (ref 37–54)
HEMOGLOBIN: 13.9 G/DL (ref 12.5–16.5)
IMMATURE GRANULOCYTES #: 0.01 E9/L
IMMATURE GRANULOCYTES %: 0.1 % (ref 0–5)
LYMPHOCYTES ABSOLUTE: 1.59 E9/L (ref 1.5–4)
LYMPHOCYTES RELATIVE PERCENT: 23.8 % (ref 20–42)
MCH RBC QN AUTO: 28.3 PG (ref 26–35)
MCHC RBC AUTO-ENTMCNC: 32.7 % (ref 32–34.5)
MCV RBC AUTO: 86.6 FL (ref 80–99.9)
MONOCYTES ABSOLUTE: 0.65 E9/L (ref 0.1–0.95)
MONOCYTES RELATIVE PERCENT: 9.7 % (ref 2–12)
NEUTROPHILS ABSOLUTE: 3.92 E9/L (ref 1.8–7.3)
NEUTROPHILS RELATIVE PERCENT: 58.6 % (ref 43–80)
PDW BLD-RTO: 12.9 FL (ref 11.5–15)
PLATELET # BLD: 253 E9/L (ref 130–450)
PMV BLD AUTO: 10.5 FL (ref 7–12)
POTASSIUM SERPL-SCNC: 4.5 MMOL/L (ref 3.5–5)
RBC # BLD: 4.91 E12/L (ref 3.8–5.8)
SODIUM BLD-SCNC: 138 MMOL/L (ref 132–146)
T4 FREE: 1.66 NG/DL (ref 0.93–1.7)
TOTAL PROTEIN: 6.9 G/DL (ref 6.4–8.3)
TSH SERPL DL<=0.05 MIU/L-ACNC: 0.22 UIU/ML (ref 0.27–4.2)
WBC # BLD: 6.7 E9/L (ref 4.5–11.5)

## 2023-01-12 PROCEDURE — 84439 ASSAY OF FREE THYROXINE: CPT

## 2023-01-12 PROCEDURE — 84432 ASSAY OF THYROGLOBULIN: CPT

## 2023-01-12 PROCEDURE — 86800 THYROGLOBULIN ANTIBODY: CPT

## 2023-01-12 PROCEDURE — 80053 COMPREHEN METABOLIC PANEL: CPT

## 2023-01-12 PROCEDURE — 85025 COMPLETE CBC W/AUTO DIFF WBC: CPT

## 2023-01-12 PROCEDURE — 99213 OFFICE O/P EST LOW 20 MIN: CPT | Performed by: INTERNAL MEDICINE

## 2023-01-12 PROCEDURE — 84443 ASSAY THYROID STIM HORMONE: CPT

## 2023-01-12 NOTE — PROGRESS NOTES
Patient did NOT stop at check out window, left without AVS.  Called patient with next follow up appointment.

## 2023-01-12 NOTE — PROGRESS NOTES
Department of [facility] Oncology  Attending Clinic Note    Reason for Visit: Follow-up on a patient with Papillary Thyroid Carcinoma     PCP:  Juliet Simmonds, DO    History of Present Illness:  27year old male that presented to Dr Alhaji Myrick for complain of swollen lymph nodes to his right neck that have been present for roughly 3 years. No famly history of lymphoma. No weight loss, fever or night sweats     CT soft tissue neck 11/29/2021 demonstrated scattered thyroid nodules largest on the right measuring up to 1.56 centimeters containing calcifications. Multiple scattered irregular right-sided lymph nodes predominantly in the right level two with slight extension into level three. Largest right level 2A/3 heterogeneous partially calcified lymph node/conglomerate measures up to 2.5 cm in long axis dimension. Largest right axial 2B/3 heterogeneous lymph node measures up to 1.7 cm in long axis dimension. A 0.7 cm oval enhancing lesion present within the right strap muscle just superior to the thyroid. CT chest 11/29/2021 demonstrated right thyroid nodule, no other acute abnormality is seen    Excisional biopsy of right cervical lymph node 12/07/2021  Cervical lymph node, right, biopsy: Papillary thyroid carcinoma   -Comment:   The carcinoma is positive for TTF-1 immunostain consistent with thyroid origin. Ultrasound head neck soft tissue 01/20/2022 demonstrated three adjacent abnormal appearing right level one submandibular lymph nodes arranged in a linear configuration. These all appear hypoechoic, the more superior of these measures 2.2 x 1.2 x 2 cm and appears more homogeneous and hypoechoic. The second lymph node measures 2 x 1.4 x 2 cm and contains multiple punctate calcifications.  The more inferior nodule measures 1.9 x 1 x 1.7 cm, these are suspicious for metastatic disease    Underwent ultrasound fine-needle aspiration 01/20/2022  FNA RT. THYROID   POSITIVE FOR MALIGNANT CELLS   Papillary carcinoma     Total thyroidectomy with central neck dissection 02/08/2022  A. Lymph nodes, right central compartment lymph node dissection: Three of five lymph nodes with metastatic papillary thyroid carcinoma (3/5). Extracapsular extension of tumor cells present. B.  Right lateral neck dissection:   Four of eight lymph nodes with metastatic papillary thyroid carcinoma (4/8). Extracapsular extension of tumor cells present. Unremarkable salivary gland tissue also identified. C.  Thyroid, total thyroidectomy:   Papillary thyroid carcinoma, multiple foci throughout specimen. Largest focus (right upper-to-mid lobe) abuts anterior and posterior inked margins. CANCER CASE SUMMARY:   Procedure: Total thyroidectomy   Tumor focality: Multifocal, see comment   Tumor sites: Right lobe, left lobe, and isthmus   Tumor size: Greatest dimension of 1.3 cm (first described tumor, right upper-to-mid lobe)   Histologic type:     -Papillary carcinoma, classic (usual, conventional); first and second described tumors and microcarcinomas     -Papillary carcinoma, follicular variant, encapsulated/well demarcated, non-invasive; third described tumor   -Angioinvasion (vascular invasion): Not identified   -Lymphatic invasion: Cannot be determined- While lymphatic invasion is not identified within the total thyroidectomy specimen, papillary carcinoma   is identified within lymphatics of the right central compartment and right lateral neck dissections (parts A and B).    -Extrathyroidal extension: Not identified   -Margin status: Margins involved by carcinoma, with papillary thyroid carcinoma identified at the inked anterior and posterior margins within the right upper-to-mid lobe (first described tumor)   -Regional lymph nodes: Regional lymph nodes present; tumor present in regional lymph nodes     Number of lymph nodes with tumor: 7   Femi level involved: Cannot be determined -     See specimens labeled as right central compartment (part A) and right lateral neck dissection (part B)     Size of largest metastatic deposit: 3.0 cm (part B)     Extranodal extension: Present     Number of lymph nodes examined: 13     Femi levels examined: Cannot be determined       See specimens labeled as right central compartment (part A) and right lateral neck dissection (part B)   Pathologic stage classification (pTNM, AJCC eighth edition):    m (multiple primary tumors)    pT1b pN1 (subcategory cannot be determined)     CT abdomen/pelvis 03/06/2022 normal CT of the abdomen pelvis. Imaging reviewed. Labs reviewed   .100 Free T4 <0.10 on 03/10/2022  Thyroglobulin Ab <0.9 on 03/10/2022  Thyroglobulin 152.5 on 03/10/2022    Endocrinology team Dr Alfrdeo Billy for CRYSTAL evaluation    NM Thyroid metastasis scan whole body 04/25/2022 :  Single focus of activity within the neck     Uneventful I-131 administration for thyroid cancer therapy 04/29/2022    NM Thyroid metastasis scan whole body 05/06/2022 :  Single focus of activity within the neck which is a normal response status post surgery  On synthroid 112 mcg po daily. TSH 51.310 on 07/07/2022; Free T4 0.88 (0.93-1.70). TSH 26.660 on 08/26/2022; Free T4  1.08 (0.93-1.70)   On levothyroxine 150 mcg po daily. TSH 8.460 on 09/15/2022; Free T4 1.28 (0.93-1.70)    Thyroid U/S on 10/12/2022: Status post thyroidectomy. There appears to be some residual or recurrent thyroid tissue on the left. No thyroid nodules in this region. There are few prominent lymph nodes in the right neck soft tissues. Largest measures 16 mm. Reniform shape however there is an loss of the fatty hilum. The lymph node is hypervascular and mildly heterogeneous. Additional smaller lymph nodes in the right neck soft tissues. TSH 9.470 on 11/07/2022; T4 Total 6.7 (4.5-11.7); Free T4 1.13 (0.93-1.70) on 11/07/2022  Thyroglobulin Ab <0.9  Thyroglobulin       10.5 (1.3-31. 8)    Increased Synthroid to 200 mcg po daily and recheck in 6-8 weeks    Ultrasound-guided FNA of an abnormal right cervical lymph node on 12/2/2022:  Positive for malignant cells. Metastatic Papillary Thyroid Carcinoma    Today 01/12/2023: No fever chills. Fair appetite and energy level. No chest pain or shortness of breath. No nausea vomiting. He is scheduled for surgery by Dr. Patrick Medina tomorrow 01/13/2023    Review of Systems;  CONSTITUTIONAL: No fever, chills. Fair appetite and energy level. ENMT: Eyes: No diplopia; Nose: No epistaxis. Mouth: No sore throat. RESPIRATORY: No hemoptysis, shortness of breath, cough. CARDIOVASCULAR: No chest pain, palpitations. GASTROINTESTINAL: No nausea/vomiting, abdominal pain. GENITOURINARY: No dysuria, urinary frequency, hematuria. NEURO: No syncope, presyncope, headache. Remainder:  ROS NEGATIVE    Past Medical History:      Diagnosis Date    Cancer ([facility] [address] 12/2021    papillary thyroid ca    Moderate episode of recurrent major depressive disorder (HCC)      Medications:  Reviewed and reconciled. Allergies:  No Known Allergies    Physical Exam:  /72   Pulse 63   Temp 96.8 °F (36 °C) (Infrared)   Resp 16   Ht 5' 10\" (1.778 m)   Wt 203 lb (92.1 kg)   SpO2 98%   BMI 29.13 kg/m²   GENERAL: Alert, oriented x 3, not in acute distress. HEENT: PERRLA; EOMI. Oropharynx clear. EXTREMITIES: Without clubbing or cyanosis or edema. NEUROLOGIC: No focal deficits.    ECOG PS 1    Lab Results   Component Value Date    WBC 6.7 01/12/2023    HGB 13.9 01/12/2023    HCT 42.5 01/12/2023    MCV 86.6 01/12/2023     01/12/2023     Lab Results   Component Value Date     01/12/2023    K 4.5 01/12/2023     01/12/2023    CO2 21 (L) 01/12/2023    BUN 17 01/12/2023    CREATININE 0.8 01/12/2023    GLUCOSE 101 (H) 01/12/2023    CALCIUM 9.3 01/12/2023    PROT 6.9 01/12/2023    LABALBU 4.1 01/12/2023    BILITOT <0.2 01/12/2023    ALKPHOS 83 01/12/2023    AST 12 01/12/2023    ALT 18 01/12/2023    LABGLOM >60 01/12/2023    GFRAA >60 09/15/2022     Lab Results   Component Value Date    TSH 0.220 (L) 01/12/2023     Impression/Plan:  28 y/o male who underwent Total thyroidectomy with central neck dissection 02/08/2022  A. Lymph nodes, right central compartment lymph node dissection: Three of five lymph nodes with metastatic papillary thyroid carcinoma (3/5). Extracapsular extension of tumor cells present. B.  Right lateral neck dissection:   Four of eight lymph nodes with metastatic papillary thyroid carcinoma (4/8). Extracapsular extension of tumor cells present. Unremarkable salivary gland tissue also identified. C.  Thyroid, total thyroidectomy:   Papillary thyroid carcinoma, multiple foci throughout specimen. Largest focus (right upper-to-mid lobe) abuts anterior and posterior inked margins. CANCER CASE SUMMARY:   Procedure: Total thyroidectomy   Tumor focality: Multifocal, see comment   Tumor sites: Right lobe, left lobe, and isthmus   Tumor size: Greatest dimension of 1.3 cm (first described tumor, right upper-to-mid lobe)   Histologic type:     -Papillary carcinoma, classic (usual, conventional); first and second described tumors and microcarcinomas     -Papillary carcinoma, follicular variant, encapsulated/well demarcated, non-invasive; third described tumor   -Angioinvasion (vascular invasion): Not identified   -Lymphatic invasion: Cannot be determined- While lymphatic invasion is not identified within the total thyroidectomy specimen, papillary carcinoma   is identified within lymphatics of the right central compartment and right lateral neck dissections (parts A and B).    -Extrathyroidal extension: Not identified   -Margin status: Margins involved by carcinoma, with papillary thyroid carcinoma identified at the inked anterior and posterior margins within the right upper-to-mid lobe (first described tumor)   -Regional lymph nodes: Regional lymph nodes present; tumor present in regional lymph nodes     Number of lymph nodes with tumor: 7   Femi level involved: Cannot be determined -     See specimens labeled as right central compartment (part A) and right lateral neck dissection (part B)     Size of largest metastatic deposit: 3.0 cm (part B)     Extranodal extension: Present     Number of lymph nodes examined: 13     Femi levels examined: Cannot be determined       See specimens labeled as right central compartment (part A) and right lateral neck dissection (part B)   Pathologic stage classification (pTNM, AJCC eighth edition):    m (multiple primary tumors)    pT1b pN1 (subcategory cannot be determined)   Pathology reviewed. CT abdomen/pelvis 03/06/2022 normal CT of the abdomen pelvis. Imaging reviewed. Labs reviewed   .100 Free T4 <0.10 on 03/10/2022  Thyroglobulin Ab <0.9 on 03/10/2022  Thyroglobulin 152.5 on 03/10/2022    Endocrinology team Dr Eden Manzo for CRYSTAL evaluation    NM Thyroid metastasis scan whole body 04/25/2022 :  Single focus of activity within the neck     Uneventful I-131 administration for thyroid cancer therapy 04/29/2022    NM Thyroid metastasis scan whole body 05/06/2022 :  Single focus of activity within the neck which is a normal response status post surgery  Imaging reviewed. Labs reviewed. On levothyroxine 112 mcg po daily  TSH 51.310 on 07/07/2022; Free T4 0.88 (0.93-1.70). TSH 26.660 on 08/26/2022; Free T4  1.08 (0.93-1.70)   On levothyroxine 150 mcg po daily. TSH 8.460 on 09/15/2022; Free T4 1.28 (0.93-1.70)    Thyroid U/S on 10/12/2022: Status post thyroidectomy. There appears to be some residual or recurrent thyroid tissue on the left. No thyroid nodules in this region. There are few prominent lymph nodes in the right neck soft tissues. Largest measures 16 mm. Reniform shape however there is an loss of the fatty hilum. The lymph node is hypervascular and mildly heterogeneous. Additional smaller lymph nodes in the right neck soft tissues.     TSH 9.470 on 11/07/2022; T4 Total 6.7 (4.5-11.7); Free T4 1.13 (0.93-1.70) on 11/07/2022  Thyroglobulin Ab <0.9  Thyroglobulin       10.5 (1.3-31. 8)    Increased Synthroid to 200 mcg po daily and recheck in 6-8 weeks    Ultrasound-guided FNA of an abnormal right cervical lymph node on 12/2/2022:  Positive for malignant cells. Metastatic Papillary Thyroid Carcinoma    On 12/15/2022:  TSH 0.111  Free T4 1.72 (0.93-1.70)  Thyroglobulin 5.5 (1.3-31. 8)  Thyroglobulin Ab <0.9    On 01/12/2023:   TSH 0.220   Free T4 1.66 (0.93-1.70)  Thyroglobulin pending. Labs reviewed. Continue Synthroid 200 mcg po daily.      Scheduled for surgery tomorrow 01/13/2023 by Dr. Otto Morton     RTC 1 month    01/12/2023  Jaxon Glass MD

## 2023-01-13 ENCOUNTER — HOSPITAL ENCOUNTER (OUTPATIENT)
Dept: DATA CONVERSION | Facility: HOSPITAL | Age: 33
Discharge: HOME | End: 2023-01-13
Attending: SURGERY | Admitting: SURGERY

## 2023-01-13 DIAGNOSIS — Z85.850 PERSONAL HISTORY OF MALIGNANT NEOPLASM OF THYROID: ICD-10-CM

## 2023-01-13 DIAGNOSIS — C73 MALIGNANT NEOPLASM OF THYROID GLAND (MULTI): ICD-10-CM

## 2023-01-13 DIAGNOSIS — Z53.9 PROCEDURE AND TREATMENT NOT CARRIED OUT, UNSPECIFIED REASON: ICD-10-CM

## 2023-01-13 DIAGNOSIS — Z20.822 CONTACT WITH AND (SUSPECTED) EXPOSURE TO COVID-19: ICD-10-CM

## 2023-01-13 LAB — SARS-COV-2 RESULT: NOT DETECTED

## 2023-01-15 LAB
THYROGLOBULIN AB: <0.9 IU/ML (ref 0–4)
THYROGLOBULIN BY LC-MS/MS, SERUM/PLASMA: NORMAL NG/ML (ref 1.3–31.8)
THYROGLOBULIN, SERUM OR PLASMA: 3.1 NG/ML (ref 1.3–31.8)

## 2023-02-01 ENCOUNTER — HOSPITAL ENCOUNTER (OUTPATIENT)
Age: 33
Discharge: HOME | End: 2023-02-01
Payer: MEDICAID

## 2023-02-01 LAB
AMPHETAMINE SCREEN, URINE: NOT DETECTED
BARBITURATE SCREEN URINE: NOT DETECTED
BENZODIAZEPINE SCREEN, URINE: NOT DETECTED
CANNABINOID SCREEN URINE: POSITIVE
COCAINE METABOLITE SCREEN URINE: NOT DETECTED
FENTANYL SCREEN, URINE: NOT DETECTED
Lab: ABNORMAL
METHADONE SCREEN, URINE: NOT DETECTED
OPIATE SCREEN URINE: NOT DETECTED
OXYCODONE URINE: NOT DETECTED
PHENCYCLIDINE SCREEN URINE: NOT DETECTED

## 2023-02-01 PROCEDURE — 80307 DRUG TEST PRSMV CHEM ANLYZR: CPT

## 2023-02-07 LAB — SARS-COV-2 RESULT: NOT DETECTED

## 2023-02-10 ENCOUNTER — TELEPHONE (OUTPATIENT)
Dept: ENDOCRINOLOGY | Age: 33
End: 2023-02-10

## 2023-02-10 NOTE — TELEPHONE ENCOUNTER
The pt had his thyroid surgery recently, and Dr Wallace Elmore would like to see him Monday 2/13/23 or Tuesday 2/14/23. I left the pt a detailed message to call me on the personal line to make that f/u appt.

## 2023-02-14 ENCOUNTER — OFFICE VISIT (OUTPATIENT)
Dept: ENDOCRINOLOGY | Age: 33
End: 2023-02-14
Payer: MEDICAID

## 2023-02-14 VITALS
WEIGHT: 205 LBS | BODY MASS INDEX: 29.41 KG/M2 | SYSTOLIC BLOOD PRESSURE: 128 MMHG | HEART RATE: 82 BPM | DIASTOLIC BLOOD PRESSURE: 83 MMHG | OXYGEN SATURATION: 99 %

## 2023-02-14 DIAGNOSIS — E03.9 HYPOTHYROIDISM, UNSPECIFIED TYPE: ICD-10-CM

## 2023-02-14 DIAGNOSIS — C73 THYROID CANCER (HCC): Primary | ICD-10-CM

## 2023-02-14 DIAGNOSIS — C79.9 METASTASIS FROM THYROID CANCER (HCC): ICD-10-CM

## 2023-02-14 DIAGNOSIS — C73 METASTASIS FROM THYROID CANCER (HCC): ICD-10-CM

## 2023-02-14 PROCEDURE — 99214 OFFICE O/P EST MOD 30 MIN: CPT | Performed by: INTERNAL MEDICINE

## 2023-02-14 NOTE — PROGRESS NOTES
[address] [place] [address] Department of Endocrinology Diabetes and Metabolism   [address] [facility] [address]   Phone: [phone]  Fax: [phone]    Date of Service: 2/14/2023  Primary Care Physician: Nadine Munoz DO  Referring physician: No ref. provider found   Provider: Ag García MD     Reason for the visit:  Papilary thyroid cancer, postsurgical hypothyroidism     History of Present Illness: The history is provided by the patient. No  was used. Accuracy of the patient data is excellent. Genie Bowen is a very pleasant 27 y.o. male seen today for management of thyroid cancer     The patient underwent total thyroidectomy on 2/8/2022     Pathology report: Papillary carcinoma, classic Papillary carcinoma, follicular variant, encapsulated/well demarcated, non-invasive; Angioinvasion: Not identified, Lymphatic invasion: Cannot be determined- While lymphatic invasion is not identified within the total thyroidectomy specimen, papillary carcinoma identified within lymphatics of the right central compartment and right lateral neck dissections  Margin status: Margins involved by carcinoma,   Lymph nodes, right central compartment lymph node dissection: Three of five lymph nodes with metastatic papillary thyroid carcinoma   (3/5). Extracapsular extension of tumor cells present. Right lateral neck dissection:  Four of eight lymph nodes with metastatic papillary thyroid carcinoma   (4/8). Extracapsular extension of tumor cells present. Unremarkable salivary gland tissue also identified. Pathologic stage classification (pTNM, AJCC eighth edition): m (multiple primary tumors) pT1b pN1 (subcategory cannot be determined)     May/3/2022 - pt receive 150 mCi CRYSTAL ablation   Post therapy scan --> Single focus of activity within the neck may be residual thyroid tissue    US 1012/2022:   There appears to be some residual or recurrent thyroid tissue on the left which measured 1.8 x 0.5 x 0.6 cm  There are several lymph nodes in the right neck soft tissues. Largest is a 16 mm lymph node seen on image 18. Reniform shape. Loss of fatty hilum. The lymph node is hypervascular and heterogeneous. Additional 10 mm lymph node and 6 mm lymph nodes have a somewhat round morphology. No suspicious lymph nodes identified on the left. 11/2022 --> FNA to suspicious Rt side cervical LN was POSITIVE for PTC     Pt underwent another surgery at [facility] in 2/2023     Pt currently on Levothyroxine 200 mcg daily. Dose was increased after last blood work . Patient takes levothyroxine in the morning at empty stomach, wait one hour before eating , avoid multivitamins containing calcium  or iron with it. PAST MEDICAL HISTORY   Past Medical History:   Diagnosis Date    Cancer ([address] 12/2021    papillary thyroid ca    Moderate episode of recurrent major depressive disorder [facility])        PAST SURGICAL HISTORY   Past Surgical History:   Procedure Laterality Date    AXILLARY SURGERY Right 12/7/2021    EXCISIONAL BIOPSY OF RIGHT CERVICAL LYMPH NODE performed by Todd Barrera DO at [place] [address]      left arm age 7     THYROIDECTOMY N/A 2/8/2022    FLEXIBLE LARYNGOSCOPY THYROIDECTOMY performed by Todd Barrera DO at [facility] OR       SOCIAL HISTORY   Tobacco:   reports that he quit smoking about 2 years ago. His smoking use included cigarettes and e-cigarettes. He smoked an average of 1 pack per day. He has never used smokeless tobacco.  Alcohol:   reports current alcohol use. Drugs:   reports current drug use. Frequency: 80.00 times per week. Drugs: Marijuana (Weed) and Cocaine.     FAMILY HISTORY   Family History   Problem Relation Age of Onset    Other Father        ALLERGIES AND DRUG REACTIONS   No Known Allergies    CURRENT MEDICATIONS   Current Outpatient Medications   Medication Sig Dispense Refill    levothyroxine (SYNTHROID) 200 MCG tablet Take 1 tablet by mouth daily 30 tablet 11 pantoprazole (PROTONIX) 40 MG tablet Take 1 tablet by mouth daily (with breakfast) 30 tablet 3     No current facility-administered medications for this visit. Review of Systems  Constitutional: No fever, no chills, no diaphoresis, no generalized weakness. HEENT: No blurred vision, No sore throat, no ear pain, no hair loss  Neck: denied any neck swelling, difficulty swallowing,   Cadrdiopulomary: No CP, SOB or palpitation, No orthopnea or PND. No cough or wheezing. GI: No N/V/D, no constipation, No abdominal pain, no melena or hematochezia   : Denied any dysuria, hematuria, flank pain, discharge, or incontinence. Skin: denied any rash, ulcer, Hirsute, or hyperpigmentation. MSK: denied any joint deformity, joint pain/swelling, muscle pain, or back pain. Neuro: no numbess, no tingling, no weakness,     OBJECTIVE    /83   Pulse 82   Wt 205 lb (93 kg)   SpO2 99%   BMI 29.41 kg/m²   BP Readings from Last 4 Encounters:   02/14/23 128/83   01/12/23 120/72   01/09/23 119/83   12/15/22 123/77     Wt Readings from Last 6 Encounters:   02/14/23 205 lb (93 kg)   01/12/23 203 lb (92.1 kg)   01/09/23 201 lb (91.2 kg)   12/15/22 199 lb 11.2 oz (90.6 kg)   11/07/22 203 lb (92.1 kg)   09/15/22 204 lb 14.4 oz (92.9 kg)       Physical examination:  General: awake alert, oriented x3, no abnormal position or movements. HEENT: normocephalic non traumatic  Neck: supple, s/p total thyroidectomy   Pulm: Clear equal air entry no added sounds, no wheezing or rhonchi    CVS: S1 + S2, no murmur, no heave. Dorsalis pedis pulse palpable   Abd: soft lax, no tenderness, no organomegaly, audible bowel sounds.   Skin: warm, no lesions, no rash  MSK: No local spine tenderness   Neuro: CN intact, sensation notmal , muscle power normal. No tremors   Psych: normal mood, and affect    Review of Laboratory Data:  I have reviewed the following:  Lab Results   Component Value Date/Time    WBC 6.7 01/12/2023 10:25 AM    RBC 4.91 01/12/2023 10:25 AM    HGB 13.9 01/12/2023 10:25 AM    HCT 42.5 01/12/2023 10:25 AM    MCV 86.6 01/12/2023 10:25 AM    MCH 28.3 01/12/2023 10:25 AM    MCHC 32.7 01/12/2023 10:25 AM    RDW 12.9 01/12/2023 10:25 AM     01/12/2023 10:25 AM    MPV 10.5 01/12/2023 10:25 AM      Lab Results   Component Value Date/Time     01/12/2023 10:25 AM    K 4.5 01/12/2023 10:25 AM    K 3.9 06/12/2021 11:45 AM    CO2 21 (L) 01/12/2023 10:25 AM    BUN 17 01/12/2023 10:25 AM    CREATININE 0.8 01/12/2023 10:25 AM    CALCIUM 9.3 01/12/2023 10:25 AM    LABGLOM >60 01/12/2023 10:25 AM    GFRAA >60 09/15/2022 10:42 AM      Lab Results   Component Value Date/Time    TSH 0.220 (L) 01/12/2023 10:25 AM    T4FREE 1.66 01/12/2023 10:25 AM    K3XDLTL 6.7 11/07/2022 12:35 PM    THGAB <0.9 01/12/2023 10:25 AM     Lab Results   Component Value Date/Time    LABA1C 5.1 07/28/2019 06:07 AM    GLUCOSE 101 01/12/2023 10:25 AM     Lab Results   Component Value Date/Time    TRIG 98 07/28/2019 06:07 AM    HDL 65 07/28/2019 06:07 AM    LDLCALC 101 07/28/2019 06:07 AM    CHOL 186 07/28/2019 06:07 AM     No results found for: Tae Herring, a 27 y.o.-old male seen in for following issues     Metastatic Papillary thyroid cancer   S/p total thyroidectomy on 2/8/2022. PTC with multiple LN metastasis   Pt received 150 mCi of CRYSTAL ablation in May/2022  Recent labs 9/15/2022) showed Tg level 16.1 with negative Tg-Ab   11/2022 --> FNA to suspicious Rt side cervical LN was POSITIVE for PTC . Pt underwent second surgery at [facility] in 2/2023   Will proceed with CRYSTAL ablation       Postsurgical hypothyroidism: On levothyroxine 200 mcg daily  Check TFT   Goal to keep TSH <0.1 if tolerated     I personally reviewed external notes from PCP and other patient's care team providers, and personally interpreted labs associated with the above diagnosis.  I also ordered labs to further assess and manage the above addressed medical conditions. Return in about 3 months (around 5/14/2023) for hypothyroidism, thyroid cancer. The above issues were reviewed with the patient who understood and agreed with the plan. Thank you for allowing us to participate in the care of this patient. Please do not hesitate to contact us with any additional questions. Diagnosis Orders   1. Thyroid cancer (HCC)  Thyroglobulin    TSH    IODINE, URINE    T4, Free    NM RADIOPHARM THERAPY ORAL      2. Hypothyroidism, unspecified type        3. Metastasis from thyroid cancer [facility])            Minda Pelayo MD  Endocrinologist, [address] [address]   Phone: [phone]  Fax: [phone]  ----------------------------  An electronic signature was used to authenticate this note.  Gail Cockayne, MD on 2/14/2023 at 12:12 PM

## 2023-02-17 DIAGNOSIS — C73 THYROID CANCER (HCC): Primary | ICD-10-CM

## 2023-03-01 LAB
COMPLETE PATHOLOGY REPORT: NORMAL
CONVERTED CLINICAL DIAGNOSIS-HISTORY: NORMAL
CONVERTED FINAL DIAGNOSIS: NORMAL
CONVERTED FINAL REPORT PDF LINK TO COPY AND PASTE: NORMAL
CONVERTED GROSS DESCRIPTION: NORMAL

## 2023-03-22 ENCOUNTER — HOSPITAL ENCOUNTER (OUTPATIENT)
Age: 33
Discharge: HOME | End: 2023-03-22
Payer: MEDICAID

## 2023-03-22 DIAGNOSIS — C73 THYROID CANCER (HCC): ICD-10-CM

## 2023-03-22 LAB
T4 FREE SERPL-MCNC: <0.1 NG/DL (ref 0.93–1.7)
TSH SERPL-MCNC: 165.1 UIU/ML (ref 0.27–4.2)

## 2023-03-22 PROCEDURE — 84432 ASSAY OF THYROGLOBULIN: CPT

## 2023-03-22 PROCEDURE — 36415 COLL VENOUS BLD VENIPUNCTURE: CPT

## 2023-03-22 PROCEDURE — 84443 ASSAY THYROID STIM HORMONE: CPT

## 2023-03-22 PROCEDURE — 86800 THYROGLOBULIN ANTIBODY: CPT

## 2023-03-22 PROCEDURE — 84439 ASSAY OF FREE THYROXINE: CPT

## 2023-03-24 ENCOUNTER — HOSPITAL ENCOUNTER (OUTPATIENT)
Dept: NUCLEAR MEDICINE | Age: 33
Discharge: HOME | End: 2023-03-24
Payer: MEDICAID

## 2023-03-24 DIAGNOSIS — C73 THYROID CANCER (HCC): ICD-10-CM

## 2023-03-24 LAB — Lab: NORMAL

## 2023-03-24 PROCEDURE — 3430000000 HC RX DIAGNOSTIC RADIOPHARMACEUTICAL: Performed by: RADIOLOGY

## 2023-03-24 PROCEDURE — 79005 NUCLEAR RX ORAL ADMIN: CPT

## 2023-03-24 PROCEDURE — A9517 I131 IODIDE CAP, RX: HCPCS | Performed by: RADIOLOGY

## 2023-03-24 RX ADMIN — SODIUM IODIDE I 131 150 MILLICURIE: 1 CAPSULE, GELATIN COATED ORAL at 10:08

## 2023-03-26 LAB
THYROGLOB AB SERPL-ACNC: <0.9 IU/ML (ref 0–4)
THYROGLOB SERPL-MCNC: 45 NG/ML (ref 1.3–31.8)
THYROGLOB SERPL-MCNC: ABNORMAL NG/ML (ref 1.3–31.8)

## 2023-03-29 LAB
Lab: NORMAL
REPORT: NORMAL
THIS TEST SENT TO: NORMAL

## 2023-03-31 ENCOUNTER — HOSPITAL ENCOUNTER (OUTPATIENT)
Dept: NUCLEAR MEDICINE | Age: 33
Discharge: HOME | End: 2023-03-31
Payer: MEDICAID

## 2023-03-31 DIAGNOSIS — C73 THYROID CANCER (HCC): ICD-10-CM

## 2023-03-31 PROCEDURE — 78018 THYROID MET IMAGING BODY: CPT

## 2023-04-02 ENCOUNTER — TELEPHONE (OUTPATIENT)
Dept: ENDOCRINOLOGY | Age: 33
End: 2023-04-02

## 2023-04-02 NOTE — TELEPHONE ENCOUNTER
Endocrine staff,   Please notify pt that I have reviewed your recent results. Excellent!  Post therapy scan was negative for any abnormal uptake  or distal metastasis

## 2023-05-08 ENCOUNTER — OFFICE VISIT (OUTPATIENT)
Dept: ENDOCRINOLOGY | Age: 33
End: 2023-05-08
Payer: MEDICAID

## 2023-05-08 VITALS
WEIGHT: 191 LBS | DIASTOLIC BLOOD PRESSURE: 71 MMHG | RESPIRATION RATE: 18 BRPM | HEIGHT: 70 IN | BODY MASS INDEX: 27.35 KG/M2 | OXYGEN SATURATION: 99 % | HEART RATE: 89 BPM | SYSTOLIC BLOOD PRESSURE: 109 MMHG

## 2023-05-08 DIAGNOSIS — C73 THYROID CANCER (HCC): Primary | ICD-10-CM

## 2023-05-08 DIAGNOSIS — C73 THYROID CANCER (HCC): ICD-10-CM

## 2023-05-08 DIAGNOSIS — E03.9 HYPOTHYROIDISM, UNSPECIFIED TYPE: ICD-10-CM

## 2023-05-08 PROCEDURE — 99214 OFFICE O/P EST MOD 30 MIN: CPT | Performed by: INTERNAL MEDICINE

## 2023-05-08 NOTE — PROGRESS NOTES
HGB 13.9 01/12/2023 10:25 AM    HCT 42.5 01/12/2023 10:25 AM    MCV 86.6 01/12/2023 10:25 AM    MCH 28.3 01/12/2023 10:25 AM    MCHC 32.7 01/12/2023 10:25 AM    RDW 12.9 01/12/2023 10:25 AM     01/12/2023 10:25 AM    MPV 10.5 01/12/2023 10:25 AM      Lab Results   Component Value Date/Time     01/12/2023 10:25 AM    K 4.5 01/12/2023 10:25 AM    K 3.9 06/12/2021 11:45 AM    CO2 21 (L) 01/12/2023 10:25 AM    BUN 17 01/12/2023 10:25 AM    CREATININE 0.8 01/12/2023 10:25 AM    CALCIUM 9.3 01/12/2023 10:25 AM    LABGLOM >60 01/12/2023 10:25 AM    GFRAA >60 09/15/2022 10:42 AM      Lab Results   Component Value Date/Time    .100 (H) 03/22/2023 11:57 AM    T4FREE <0.10 (L) 03/22/2023 11:57 AM    F3HONNU 6.7 11/07/2022 12:35 PM    THGAB <0.9 03/22/2023 11:57 AM     Lab Results   Component Value Date/Time    LABA1C 5.1 07/28/2019 06:07 AM    GLUCOSE 101 01/12/2023 10:25 AM     Lab Results   Component Value Date/Time    TRIG 98 07/28/2019 06:07 AM    HDL 65 07/28/2019 06:07 AM    LDLCALC 101 07/28/2019 06:07 AM    CHOL 186 07/28/2019 06:07 AM     No results found for: [address] Herring, a 27 y.o.-old male seen in for following issues     Metastatic Papillary thyroid cancer   S/p total thyroidectomy on 2/8/2022. PTC with multiple LN metastasis   Pt received 150 mCi of CRYSTAL ablation in May/2022  Recent labs 9/15/2022) showed Tg level 16.1 with negative Tg-Ab   11/2022 --> FNA to suspicious Rt side cervical LN was POSITIVE for PTC . Pt underwent second surgery at [facility] in 2/2023   Received second dose of CRYSTAL 150 mCi in 3/2023. Postherapy scan was negative for distal mets   Due for labs now       Postsurgical hypothyroidism: On levothyroxine 200 mcg daily  Check TFT   Goal to keep TSH <0.1 if tolerated     I personally reviewed external notes from PCP and other patient's care team providers, and personally interpreted labs associated with the above diagnosis.  I also

## 2023-05-09 LAB
T4 FREE SERPL-MCNC: 2.36 NG/DL (ref 0.93–1.7)
TSH SERPL-MCNC: 0.14 UIU/ML (ref 0.27–4.2)

## 2023-05-10 ENCOUNTER — TELEPHONE (OUTPATIENT)
Dept: ENDOCRINOLOGY | Age: 33
End: 2023-05-10

## 2023-05-10 LAB
THYROGLOB AB SERPL-ACNC: <0.9 IU/ML (ref 0–4)
THYROGLOB SERPL-MCNC: 4 NG/ML (ref 1.3–31.8)
THYROGLOB SERPL-MCNC: NORMAL NG/ML (ref 1.3–31.8)

## 2023-05-10 NOTE — TELEPHONE ENCOUNTER
Endocrine staff,   Please notify pt that I have reviewed your recent results.      Thyroid level at goal, continue current dose of Levothyroxine

## 2023-05-11 ENCOUNTER — TELEPHONE (OUTPATIENT)
Dept: ENDOCRINOLOGY | Age: 33
End: 2023-05-11

## 2023-05-11 DIAGNOSIS — C73 THYROID CANCER (HCC): Primary | ICD-10-CM

## 2023-05-12 NOTE — TELEPHONE ENCOUNTER
Endocrine staff,   Please notify pt that I have reviewed your recent results. Your tumor marker is much better but still detectable. Will continue monitoring.  Will repeat level again in July

## 2023-05-25 ENCOUNTER — HOSPITAL ENCOUNTER (OUTPATIENT)
Dept: ULTRASOUND IMAGING | Age: 33
Discharge: HOME | End: 2023-05-27
Payer: MEDICAID

## 2023-05-25 DIAGNOSIS — C73 THYROID CANCER (HCC): ICD-10-CM

## 2023-05-25 PROCEDURE — 76536 US EXAM OF HEAD AND NECK: CPT

## 2023-05-29 ENCOUNTER — TELEPHONE (OUTPATIENT)
Dept: ENDOCRINOLOGY | Age: 33
End: 2023-05-29

## 2023-05-29 NOTE — TELEPHONE ENCOUNTER
Endocrine staff,   Please notify pt that I have reviewed your recent results. Excellent! US was negative for any evidence of recurrence.  Dominant cervical lymph node remained stable comparing with the previous studies

## 2023-07-05 ENCOUNTER — HOSPITAL ENCOUNTER (OUTPATIENT)
Dept: INFUSION THERAPY | Age: 33
Discharge: HOME | End: 2023-07-05
Payer: COMMERCIAL

## 2023-07-05 ENCOUNTER — OFFICE VISIT (OUTPATIENT)
Dept: ONCOLOGY | Age: 33
End: 2023-07-05
Payer: COMMERCIAL

## 2023-07-05 VITALS
SYSTOLIC BLOOD PRESSURE: 120 MMHG | OXYGEN SATURATION: 95 % | HEIGHT: 70 IN | TEMPERATURE: 97.8 F | WEIGHT: 186.5 LBS | HEART RATE: 73 BPM | BODY MASS INDEX: 26.7 KG/M2 | DIASTOLIC BLOOD PRESSURE: 72 MMHG

## 2023-07-05 DIAGNOSIS — C73 PAPILLARY THYROID CARCINOMA (HCC): ICD-10-CM

## 2023-07-05 DIAGNOSIS — C73 PAPILLARY THYROID CARCINOMA (HCC): Primary | ICD-10-CM

## 2023-07-05 LAB
ALBUMIN SERPL-MCNC: 4.3 G/DL (ref 3.5–5.2)
ALP SERPL-CCNC: 88 U/L (ref 40–129)
ALT SERPL-CCNC: 22 U/L (ref 0–40)
ANION GAP SERPL CALCULATED.3IONS-SCNC: 10 MMOL/L (ref 7–16)
AST SERPL-CCNC: 24 U/L (ref 0–39)
BASOPHILS # BLD: 0.03 E9/L (ref 0–0.2)
BASOPHILS NFR BLD: 0.5 % (ref 0–2)
BILIRUB SERPL-MCNC: 0.3 MG/DL (ref 0–1.2)
BUN SERPL-MCNC: 14 MG/DL (ref 6–20)
CALCIUM SERPL-MCNC: 9 MG/DL (ref 8.6–10.2)
CHLORIDE SERPL-SCNC: 103 MMOL/L (ref 98–107)
CO2 SERPL-SCNC: 24 MMOL/L (ref 22–29)
CREAT SERPL-MCNC: 0.8 MG/DL (ref 0.7–1.2)
EOSINOPHIL # BLD: 0.28 E9/L (ref 0.05–0.5)
EOSINOPHIL NFR BLD: 4.5 % (ref 0–6)
ERYTHROCYTE [DISTWIDTH] IN BLOOD BY AUTOMATED COUNT: 12.3 FL (ref 11.5–15)
GLUCOSE SERPL-MCNC: 120 MG/DL (ref 74–99)
HCT VFR BLD AUTO: 43.1 % (ref 37–54)
HGB BLD-MCNC: 14 G/DL (ref 12.5–16.5)
IMM GRANULOCYTES # BLD: 0.01 E9/L
IMM GRANULOCYTES NFR BLD: 0.2 % (ref 0–5)
LYMPHOCYTES # BLD: 1.43 E9/L (ref 1.5–4)
LYMPHOCYTES NFR BLD: 22.8 % (ref 20–42)
MCH RBC QN AUTO: 29.2 PG (ref 26–35)
MCHC RBC AUTO-ENTMCNC: 32.5 % (ref 32–34.5)
MCV RBC AUTO: 89.8 FL (ref 80–99.9)
MONOCYTES # BLD: 0.61 E9/L (ref 0.1–0.95)
MONOCYTES NFR BLD: 9.7 % (ref 2–12)
NEUTROPHILS # BLD: 3.92 E9/L (ref 1.8–7.3)
NEUTS SEG NFR BLD: 62.3 % (ref 43–80)
PLATELET # BLD AUTO: 260 E9/L (ref 130–450)
PMV BLD AUTO: 10.5 FL (ref 7–12)
POTASSIUM SERPL-SCNC: 3.6 MMOL/L (ref 3.5–5)
PROT SERPL-MCNC: 7 G/DL (ref 6.4–8.3)
RBC # BLD AUTO: 4.8 E12/L (ref 3.8–5.8)
SODIUM SERPL-SCNC: 137 MMOL/L (ref 132–146)
T4 FREE SERPL-MCNC: 1.89 NG/DL (ref 0.93–1.7)
TSH SERPL-MCNC: 0.04 UIU/ML (ref 0.27–4.2)
WBC # BLD: 6.3 E9/L (ref 4.5–11.5)

## 2023-07-05 PROCEDURE — 80053 COMPREHEN METABOLIC PANEL: CPT

## 2023-07-05 PROCEDURE — 86800 THYROGLOBULIN ANTIBODY: CPT

## 2023-07-05 PROCEDURE — 85025 COMPLETE CBC W/AUTO DIFF WBC: CPT

## 2023-07-05 PROCEDURE — 99214 OFFICE O/P EST MOD 30 MIN: CPT | Performed by: INTERNAL MEDICINE

## 2023-07-05 PROCEDURE — 84443 ASSAY THYROID STIM HORMONE: CPT

## 2023-07-05 PROCEDURE — 99212 OFFICE O/P EST SF 10 MIN: CPT

## 2023-07-05 PROCEDURE — 84432 ASSAY OF THYROGLOBULIN: CPT

## 2023-07-05 PROCEDURE — 84439 ASSAY OF FREE THYROXINE: CPT

## 2023-07-05 NOTE — PROGRESS NOTES
Department of [facility] Oncology  Attending Clinic Note    Reason for Visit: Follow-up on a patient with Papillary Thyroid Carcinoma     PCP:  Javon Navarrete DO    History of Present Illness:  27year old male that presented to Dr Fifi Lino for complain of swollen lymph nodes to his right neck that have been present for roughly 3 years. No famly history of lymphoma. No weight loss, fever or night sweats     CT soft tissue neck 11/29/2021 demonstrated scattered thyroid nodules largest on the right measuring up to 1.56 centimeters containing calcifications. Multiple scattered irregular right-sided lymph nodes predominantly in the right level two with slight extension into level three. Largest right level 2A/3 heterogeneous partially calcified lymph node/conglomerate measures up to 2.5 cm in long axis dimension. Largest right axial 2B/3 heterogeneous lymph node measures up to 1.7 cm in long axis dimension. A 0.7 cm oval enhancing lesion present within the right strap muscle just superior to the thyroid. CT chest 11/29/2021 demonstrated right thyroid nodule, no other acute abnormality is seen    Excisional biopsy of right cervical lymph node 12/07/2021  Cervical lymph node, right, biopsy: Papillary thyroid carcinoma   -Comment:   The carcinoma is positive for TTF-1 immunostain consistent with thyroid origin. Ultrasound head neck soft tissue 01/20/2022 demonstrated three adjacent abnormal appearing right level one submandibular lymph nodes arranged in a linear configuration. These all appear hypoechoic, the more superior of these measures 2.2 x 1.2 x 2 cm and appears more homogeneous and hypoechoic. The second lymph node measures 2 x 1.4 x 2 cm and contains multiple punctate calcifications.  The more inferior nodule measures 1.9 x 1 x 1.7 cm, these are suspicious for metastatic disease    Underwent ultrasound fine-needle aspiration 01/20/2022  [facility]NA RT. THYROID   POSITIVE FOR MALIGNANT CELLS   Papillary

## 2023-07-07 LAB
THYROGLOB AB SERPL-ACNC: <0.9 IU/ML (ref 0–4)
THYROGLOB SERPL-MCNC: 1.4 NG/ML (ref 1.3–31.8)
THYROGLOB SERPL-MCNC: NORMAL NG/ML (ref 1.3–31.8)

## 2023-09-06 VITALS — BODY MASS INDEX: 30.11 KG/M2 | HEIGHT: 70 IN | WEIGHT: 210.32 LBS

## 2023-11-03 DIAGNOSIS — C73 PAPILLARY THYROID CARCINOMA (HCC): Primary | ICD-10-CM

## 2023-11-08 DIAGNOSIS — E03.9 HYPOTHYROIDISM, UNSPECIFIED TYPE: ICD-10-CM

## 2023-11-08 DIAGNOSIS — C73 PAPILLARY THYROID CARCINOMA (HCC): Primary | ICD-10-CM

## (undated) DEVICE — PROBE 8225101 5PK STD PRASS FL TIP ROHS

## (undated) DEVICE — SOLUTION IV IRRIG POUR BRL 0.9% SODIUM CHL 2F7124

## (undated) DEVICE — CHLORAPREP 26ML ORANGE

## (undated) DEVICE — GLOVE ORANGE PI 7   MSG9070

## (undated) DEVICE — GAUZE,SPONGE,4"X4",8PLY,STRL,LF,10/TRAY: Brand: MEDLINE

## (undated) DEVICE — FORCEPS RAT TOOTH 1X2

## (undated) DEVICE — INTENDED FOR TISSUE SEPARATION, AND OTHER PROCEDURES THAT REQUIRE A SHARP SURGICAL BLADE TO PUNCTURE OR CUT.: Brand: BARD-PARKER ® STAINLESS STEEL BLADES

## (undated) DEVICE — SYMMETRY® RETRACTOR, WEITLANER, BLUNT, 5 1/2 IN: Brand: SYMMETRY WEITLANER

## (undated) DEVICE — SET INSTR BABY LAP

## (undated) DEVICE — GOWN,SIRUS,FABRNF,XL,20/CS: Brand: MEDLINE

## (undated) DEVICE — GLOVE ORANGE PI 7 1/2   MSG9075

## (undated) DEVICE — ELECTRODE PT RET AD L9FT HI MOIST COND ADH HYDRGEL CORDED

## (undated) DEVICE — Device

## (undated) DEVICE — SPONGE,DRAIN,NONWVN,4"X4",6PLY,STRL,LF: Brand: MEDLINE

## (undated) DEVICE — COVER HNDL LT DISP

## (undated) DEVICE — DOUBLE BASIN SET: Brand: MEDLINE INDUSTRIES, INC.

## (undated) DEVICE — PACK PROCEDURE SURG GEN CUST

## (undated) DEVICE — BLADE CLIPPER GEN PURP NS

## (undated) DEVICE — SHEET, T, LAPAROTOMY, STERILE: Brand: MEDLINE

## (undated) DEVICE — EXTRAS THYROID

## (undated) DEVICE — APPLIER LIG CLP M L11IN TI STR RNG HNDL FOR 20 CLP DISP

## (undated) DEVICE — APPLIER CLP L9.375IN APER 2.1MM CLS L3.8MM 20 SM TI CLP

## (undated) DEVICE — SPONGE,PEANUT,XRAY,ST,SM,3/8",5/CARD: Brand: MEDLINE INDUSTRIES, INC.

## (undated) DEVICE — MAGNETIC INSTR DRAPE 20X16: Brand: MEDLINE INDUSTRIES, INC.

## (undated) DEVICE — ENDOTRACH TUBE 8229507 CONT EMG 7MM ROHS: Brand: NIM CONTACT®

## (undated) DEVICE — NEEDLE HYPO 25GA L1.5IN BLU POLYPR HUB S STL REG BVL STR

## (undated) DEVICE — SPONGE,DISSECTOR,ROUND CHERRY,XR,ST,5/PK: Brand: MEDLINE

## (undated) DEVICE — BLADE ES ELASTOMERIC COAT INSUL DURABLE BEND UPTO 90DEG

## (undated) DEVICE — TOWEL,OR,DSP,ST,BLUE,STD,6/PK,12PK/CS: Brand: MEDLINE

## (undated) DEVICE — ABSORBENT, WATERPROOF, BACTERIA PROOF FILM DRESSING: Brand: OPSITE POST OP 15.5X8.5CM CTN 20

## (undated) DEVICE — SHEARS ENDOSCP L9CM CRV HARM FOCS +

## (undated) DEVICE — 4-PORT MANIFOLD: Brand: NEPTUNE 2

## (undated) DEVICE — SURGICAL PROCEDURE PACK EENT CUST

## (undated) DEVICE — GAUZE,SPONGE,4"X4",16PLY,XRAY,STRL,LF: Brand: MEDLINE

## (undated) DEVICE — DRAIN SURG PENROSE 0.5X12 IN PREM SIL STRL

## (undated) DEVICE — PAD,NON-ADHERENT,3X8,STERILE,LF,1/PK: Brand: MEDLINE

## (undated) DEVICE — HOOK SKIN RETRACT

## (undated) DEVICE — STRIP,CLOSURE,WOUND,MEDI-STRIP,1/4X3: Brand: MEDLINE

## (undated) DEVICE — SET SURG BASIN MAYO REUSABLE

## (undated) DEVICE — NEW BL HARMONIC CORD

## (undated) DEVICE — MARKER,SKIN,WI/RULER AND LABELS: Brand: MEDLINE

## (undated) DEVICE — 3M™ TEGADERM™ TRANSPARENT FILM DRESSING FRAME STYLE, 1626W, 4 IN X 4-3/4 IN (10 CM X 12 CM), 50/CT 4CT/CASE: Brand: 3M™ TEGADERM™

## (undated) DEVICE — ELECTRODE ELECSURG NDL 2.8 INX7.2 CM COAT INSUL EDGE

## (undated) DEVICE — ADHESIVE SKIN CLSR 0.7ML TOP DERMBND ADV